# Patient Record
Sex: FEMALE | Race: WHITE | NOT HISPANIC OR LATINO | Employment: OTHER | URBAN - METROPOLITAN AREA
[De-identification: names, ages, dates, MRNs, and addresses within clinical notes are randomized per-mention and may not be internally consistent; named-entity substitution may affect disease eponyms.]

---

## 2017-10-26 ENCOUNTER — APPOINTMENT (OUTPATIENT)
Dept: WOMENS IMAGING | Facility: HOSPITAL | Age: 82
End: 2017-10-26

## 2017-10-26 PROCEDURE — G0202 SCR MAMMO BI INCL CAD: HCPCS | Performed by: RADIOLOGY

## 2017-11-02 ENCOUNTER — APPOINTMENT (OUTPATIENT)
Dept: WOMENS IMAGING | Facility: HOSPITAL | Age: 82
End: 2017-11-02

## 2017-11-02 PROCEDURE — G0206 DX MAMMO INCL CAD UNI: HCPCS | Performed by: RADIOLOGY

## 2017-11-14 ENCOUNTER — APPOINTMENT (OUTPATIENT)
Dept: WOMENS IMAGING | Facility: HOSPITAL | Age: 82
End: 2017-11-14

## 2017-11-14 PROCEDURE — 77080 DXA BONE DENSITY AXIAL: CPT | Performed by: RADIOLOGY

## 2017-11-14 PROCEDURE — 19081 BX BREAST 1ST LESION STRTCTC: CPT | Performed by: RADIOLOGY

## 2018-08-20 ENCOUNTER — OFFICE VISIT CONVERTED (OUTPATIENT)
Dept: CARDIOLOGY | Facility: CLINIC | Age: 83
End: 2018-08-20
Attending: INTERNAL MEDICINE

## 2018-08-20 ENCOUNTER — CONVERSION ENCOUNTER (OUTPATIENT)
Dept: CARDIOLOGY | Facility: CLINIC | Age: 83
End: 2018-08-20

## 2018-10-29 ENCOUNTER — APPOINTMENT (OUTPATIENT)
Dept: WOMENS IMAGING | Facility: HOSPITAL | Age: 83
End: 2018-10-29

## 2018-10-29 PROCEDURE — 77067 SCR MAMMO BI INCL CAD: CPT | Performed by: RADIOLOGY

## 2018-12-26 ENCOUNTER — CONVERSION ENCOUNTER (OUTPATIENT)
Dept: ORTHOPEDIC SURGERY | Facility: CLINIC | Age: 83
End: 2018-12-26

## 2018-12-26 ENCOUNTER — OFFICE VISIT CONVERTED (OUTPATIENT)
Dept: ORTHOPEDIC SURGERY | Facility: CLINIC | Age: 83
End: 2018-12-26
Attending: ORTHOPAEDIC SURGERY

## 2019-02-05 ENCOUNTER — HOSPITAL ENCOUNTER (OUTPATIENT)
Dept: LAB | Facility: HOSPITAL | Age: 84
Discharge: HOME | End: 2019-02-05
Attending: INTERNAL MEDICINE

## 2019-02-05 LAB
BASOPHILS # BLD AUTO: 0.05 10*3/UL (ref 0–0.2)
BASOPHILS NFR BLD AUTO: 0.42 % (ref 0–3)
EOSINOPHIL # BLD AUTO: 0.46 10*3/UL (ref 0–0.7)
EOSINOPHIL # BLD AUTO: 3.79 % (ref 0–7)
ERYTHROCYTE [DISTWIDTH] IN BLOOD BY AUTOMATED COUNT: 14.6 % (ref 11.5–14.5)
HBA1C MFR BLD: 12.2 G/DL (ref 12–16)
HCT VFR BLD AUTO: 37.2 % (ref 37–47)
LYMPHOCYTES # BLD AUTO: 2.26 10*3/UL (ref 1–5)
MCH RBC QN AUTO: 29.6 PG (ref 27–31)
MCHC RBC AUTO-ENTMCNC: 33 G/DL (ref 33–37)
MCV RBC AUTO: 89.9 FL (ref 81–99)
MONOCYTES # BLD AUTO: 0.75 10*3/UL (ref 0.2–1.2)
MONOCYTES NFR BLD AUTO: 6.27 % (ref 3–10)
NEUTROPHILS # BLD AUTO: 8.52 10*3/UL (ref 2–8)
NEUTROPHILS NFR BLD AUTO: 70.8 % (ref 30–85)
NRBC BLD AUTO-RTO: 0 % (ref 0–0.01)
PLATELET # BLD AUTO: 378 10*3/UL (ref 130–400)
PMV BLD AUTO: 6.3 FL (ref 7.4–10.4)
RBC # BLD AUTO: 4.13 10*6/UL (ref 4.2–5.4)
VARIANT LYMPHS NFR BLD MANUAL: 18.7 % (ref 20–45)
WBC # BLD AUTO: 12 10*3/UL (ref 4.8–10.8)

## 2019-02-19 ENCOUNTER — HOSPITAL ENCOUNTER (OUTPATIENT)
Dept: LAB | Facility: HOSPITAL | Age: 84
Discharge: HOME | End: 2019-02-19
Attending: INTERNAL MEDICINE

## 2019-02-19 LAB
ALBUMIN SERPL-MCNC: 3.9 G/DL (ref 3.5–5)
ALBUMIN/GLOB SERPL: 1.3 {RATIO} (ref 1.4–2.6)
ALP SERPL-CCNC: 72 U/L (ref 43–160)
ALT SERPL-CCNC: 19 U/L (ref 10–40)
ANION GAP SERPL CALC-SCNC: 16 MMOL/L (ref 8–19)
AST SERPL-CCNC: 15 U/L (ref 15–50)
BILIRUB SERPL-MCNC: 0.31 MG/DL (ref 0.2–1.3)
BUN SERPL-MCNC: 23 MG/DL (ref 5–25)
BUN/CREAT SERPL: 25 {RATIO} (ref 6–20)
CALCIUM SERPL-MCNC: 9.7 MG/DL (ref 8.7–10.4)
CHLORIDE SERPL-SCNC: 97 MMOL/L (ref 99–111)
CHOLEST SERPL-MCNC: 142 MG/DL (ref 107–200)
CHOLEST/HDLC SERPL: 2.7 {RATIO} (ref 3–6)
CONV CO2: 28 MMOL/L (ref 22–32)
CONV TOTAL PROTEIN: 7 G/DL (ref 6.3–8.2)
CREAT UR-MCNC: 0.91 MG/DL (ref 0.5–0.9)
GFR SERPLBLD BASED ON 1.73 SQ M-ARVRAT: 59 ML/MIN/{1.73_M2}
GLOBULIN UR ELPH-MCNC: 3.1 G/DL (ref 2–3.5)
GLUCOSE SERPL-MCNC: 112 MG/DL (ref 65–99)
HDLC SERPL-MCNC: 53 MG/DL (ref 40–60)
LDLC SERPL CALC-MCNC: 58 MG/DL (ref 70–100)
OSMOLALITY SERPL CALC.SUM OF ELEC: 286 MOSM/KG (ref 273–304)
POTASSIUM SERPL-SCNC: 4.6 MMOL/L (ref 3.5–5.3)
SODIUM SERPL-SCNC: 136 MMOL/L (ref 135–147)
TRIGL SERPL-MCNC: 156 MG/DL (ref 40–150)
VLDLC SERPL-MCNC: 31 MG/DL (ref 5–37)

## 2019-02-20 ENCOUNTER — OFFICE VISIT CONVERTED (OUTPATIENT)
Dept: CARDIOLOGY | Facility: CLINIC | Age: 84
End: 2019-02-20
Attending: INTERNAL MEDICINE

## 2019-04-16 ENCOUNTER — HOSPITAL ENCOUNTER (OUTPATIENT)
Dept: LAB | Facility: HOSPITAL | Age: 84
Discharge: HOME | End: 2019-04-16
Attending: INTERNAL MEDICINE

## 2019-04-16 ENCOUNTER — HOSPITAL ENCOUNTER (OUTPATIENT)
Dept: GENERAL RADIOLOGY | Facility: HOSPITAL | Age: 84
Discharge: HOME | End: 2019-04-16
Attending: INTERNAL MEDICINE

## 2019-04-16 LAB
BASOPHILS # BLD AUTO: 0.05 10*3/UL (ref 0–0.2)
BASOPHILS NFR BLD AUTO: 0.3 % (ref 0–3)
BNP SERPL-MCNC: 52 PG/ML (ref 0–1800)
CONV ABS IMM GRAN: 0.04 10*3/UL (ref 0–0.2)
CONV IMMATURE GRAN: 0.3 % (ref 0–1.8)
DEPRECATED RDW RBC AUTO: 43.8 FL (ref 36.4–46.3)
EOSINOPHIL # BLD AUTO: 0.76 10*3/UL (ref 0–0.7)
EOSINOPHIL # BLD AUTO: 5.3 % (ref 0–7)
ERYTHROCYTE [DISTWIDTH] IN BLOOD BY AUTOMATED COUNT: 13.3 % (ref 11.7–14.4)
HBA1C MFR BLD: 11.5 G/DL (ref 12–16)
HCT VFR BLD AUTO: 36 % (ref 37–47)
LYMPHOCYTES # BLD AUTO: 1.68 10*3/UL (ref 1–5)
MCH RBC QN AUTO: 29.1 PG (ref 27–31)
MCHC RBC AUTO-ENTMCNC: 31.9 G/DL (ref 33–37)
MCV RBC AUTO: 91.1 FL (ref 81–99)
MONOCYTES # BLD AUTO: 0.85 10*3/UL (ref 0.2–1.2)
MONOCYTES NFR BLD AUTO: 5.9 % (ref 3–10)
NEUTROPHILS # BLD AUTO: 11.04 10*3/UL (ref 2–8)
NEUTROPHILS NFR BLD AUTO: 76.5 % (ref 30–85)
NRBC CBCN: 0 % (ref 0–0.7)
PLATELET # BLD AUTO: 343 10*3/UL (ref 130–400)
PMV BLD AUTO: 9.2 FL (ref 9.4–12.3)
RBC # BLD AUTO: 3.95 10*6/UL (ref 4.2–5.4)
VARIANT LYMPHS NFR BLD MANUAL: 11.7 % (ref 20–45)
WBC # BLD AUTO: 14.42 10*3/UL (ref 4.8–10.8)

## 2019-04-17 ENCOUNTER — HOSPITAL ENCOUNTER (OUTPATIENT)
Dept: GENERAL RADIOLOGY | Facility: HOSPITAL | Age: 84
Discharge: HOME | End: 2019-04-17
Attending: INTERNAL MEDICINE

## 2019-04-18 ENCOUNTER — HOSPITAL ENCOUNTER (OUTPATIENT)
Dept: LAB | Facility: HOSPITAL | Age: 84
Discharge: HOME | End: 2019-04-18
Attending: INTERNAL MEDICINE

## 2019-04-18 LAB — IMMUNOCAP RESULT: NORMAL (ref 0–0)

## 2019-04-21 LAB
A FLAVUS IGE QN: NEGATIVE
A NIGER AB SER QL: NEGATIVE
CONV ASPERGILLUS FUMIGATUS AB.: NEGATIVE

## 2019-04-22 LAB
A FUMIGATUS AB SER QL ID: <0.1 K[IU]/ML
IGE SERPL-ACNC: 29 K[IU]/ML (ref 0–24)

## 2019-04-29 ENCOUNTER — HOSPITAL ENCOUNTER (OUTPATIENT)
Dept: LAB | Facility: HOSPITAL | Age: 84
Discharge: HOME | End: 2019-04-29
Attending: INTERNAL MEDICINE

## 2019-04-29 LAB
ALBUMIN SERPL-MCNC: 4.3 G/DL (ref 3.5–5)
ALBUMIN/GLOB SERPL: 1.3 {RATIO} (ref 1.4–2.6)
ALP SERPL-CCNC: 72 U/L (ref 43–160)
ALT SERPL-CCNC: 18 U/L (ref 10–40)
ANION GAP SERPL CALC-SCNC: 20 MMOL/L (ref 8–19)
AST SERPL-CCNC: 16 U/L (ref 15–50)
BASOPHILS # BLD AUTO: 0.07 10*3/UL (ref 0–0.2)
BASOPHILS NFR BLD AUTO: 0.6 % (ref 0–3)
BILIRUB SERPL-MCNC: 0.36 MG/DL (ref 0.2–1.3)
BUN SERPL-MCNC: 16 MG/DL (ref 5–25)
BUN/CREAT SERPL: 19 {RATIO} (ref 6–20)
CALCIUM SERPL-MCNC: 10.1 MG/DL (ref 8.7–10.4)
CHLORIDE SERPL-SCNC: 96 MMOL/L (ref 99–111)
CONV ABS IMM GRAN: 0.04 10*3/UL (ref 0–0.2)
CONV CO2: 27 MMOL/L (ref 22–32)
CONV IMMATURE GRAN: 0.3 % (ref 0–1.8)
CONV TOTAL PROTEIN: 7.5 G/DL (ref 6.3–8.2)
CREAT UR-MCNC: 0.84 MG/DL (ref 0.5–0.9)
DEPRECATED RDW RBC AUTO: 45.2 FL (ref 36.4–46.3)
EOSINOPHIL # BLD AUTO: 0.39 10*3/UL (ref 0–0.7)
EOSINOPHIL # BLD AUTO: 3.2 % (ref 0–7)
ERYTHROCYTE [DISTWIDTH] IN BLOOD BY AUTOMATED COUNT: 13.3 % (ref 11.7–14.4)
EST. AVERAGE GLUCOSE BLD GHB EST-MCNC: 146 MG/DL
GFR SERPLBLD BASED ON 1.73 SQ M-ARVRAT: >60 ML/MIN/{1.73_M2}
GLOBULIN UR ELPH-MCNC: 3.2 G/DL (ref 2–3.5)
GLUCOSE SERPL-MCNC: 89 MG/DL (ref 65–99)
HBA1C MFR BLD: 12.2 G/DL (ref 12–16)
HBA1C MFR BLD: 6.7 % (ref 3.5–5.7)
HCT VFR BLD AUTO: 39.8 % (ref 37–47)
LYMPHOCYTES # BLD AUTO: 1.75 10*3/UL (ref 1–5)
MCH RBC QN AUTO: 28.4 PG (ref 27–31)
MCHC RBC AUTO-ENTMCNC: 30.7 G/DL (ref 33–37)
MCV RBC AUTO: 92.8 FL (ref 81–99)
MONOCYTES # BLD AUTO: 0.9 10*3/UL (ref 0.2–1.2)
MONOCYTES NFR BLD AUTO: 7.3 % (ref 3–10)
NEUTROPHILS # BLD AUTO: 9.1 10*3/UL (ref 2–8)
NEUTROPHILS NFR BLD AUTO: 74.3 % (ref 30–85)
NRBC CBCN: 0 % (ref 0–0.7)
OSMOLALITY SERPL CALC.SUM OF ELEC: 287 MOSM/KG (ref 273–304)
PLATELET # BLD AUTO: 396 10*3/UL (ref 130–400)
PMV BLD AUTO: 9 FL (ref 9.4–12.3)
POTASSIUM SERPL-SCNC: 4.5 MMOL/L (ref 3.5–5.3)
RBC # BLD AUTO: 4.29 10*6/UL (ref 4.2–5.4)
SODIUM SERPL-SCNC: 138 MMOL/L (ref 135–147)
VARIANT LYMPHS NFR BLD MANUAL: 14.3 % (ref 20–45)
WBC # BLD AUTO: 12.25 10*3/UL (ref 4.8–10.8)

## 2019-05-16 ENCOUNTER — HOSPITAL ENCOUNTER (OUTPATIENT)
Dept: CARDIOLOGY | Facility: HOSPITAL | Age: 84
Discharge: HOME | End: 2019-05-16
Attending: INTERNAL MEDICINE

## 2019-06-13 ENCOUNTER — OFFICE VISIT CONVERTED (OUTPATIENT)
Dept: ONCOLOGY | Facility: HOSPITAL | Age: 84
End: 2019-06-13
Attending: INTERNAL MEDICINE

## 2019-06-26 ENCOUNTER — HOSPITAL ENCOUNTER (OUTPATIENT)
Dept: ONCOLOGY | Facility: HOSPITAL | Age: 84
Discharge: HOME | End: 2019-06-26
Attending: INTERNAL MEDICINE

## 2019-06-26 ENCOUNTER — OFFICE VISIT CONVERTED (OUTPATIENT)
Dept: ONCOLOGY | Facility: HOSPITAL | Age: 84
End: 2019-06-26
Attending: INTERNAL MEDICINE

## 2019-06-26 LAB
ALBUMIN SERPL-MCNC: 4 G/DL (ref 3.5–5)
ALBUMIN/GLOB SERPL: 1.3 {RATIO} (ref 1.4–2.6)
ALP SERPL-CCNC: 76 U/L (ref 43–160)
ALT SERPL-CCNC: 20 U/L (ref 10–40)
ANION GAP SERPL CALC-SCNC: 17 MMOL/L (ref 8–19)
AST SERPL-CCNC: 13 U/L (ref 15–50)
BASOPHILS # BLD AUTO: 0.03 10*3/UL (ref 0–0.2)
BASOPHILS NFR BLD AUTO: 0.3 % (ref 0–3)
BILIRUB SERPL-MCNC: 0.4 MG/DL (ref 0.2–1.3)
BUN SERPL-MCNC: 13 MG/DL (ref 5–25)
BUN/CREAT SERPL: 13 {RATIO} (ref 6–20)
CALCIUM SERPL-MCNC: 9.2 MG/DL (ref 8.7–10.4)
CHLORIDE SERPL-SCNC: 95 MMOL/L (ref 99–111)
CONV ABS IMM GRAN: 0.03 10*3/UL (ref 0–0.2)
CONV CO2: 27 MMOL/L (ref 22–32)
CONV IMMATURE GRAN: 0.3 % (ref 0–1.8)
CONV TOTAL PROTEIN: 7.1 G/DL (ref 6.3–8.2)
CREAT UR-MCNC: 1 MG/DL (ref 0.5–0.9)
CRP SERPL HS-MCNC: 0.76 MG/DL (ref 0–0.5)
DEPRECATED RDW RBC AUTO: 47.6 FL (ref 36.4–46.3)
EOSINOPHIL # BLD AUTO: 0.21 10*3/UL (ref 0–0.7)
EOSINOPHIL # BLD AUTO: 1.8 % (ref 0–7)
ERYTHROCYTE [DISTWIDTH] IN BLOOD BY AUTOMATED COUNT: 14.4 % (ref 11.7–14.4)
ERYTHROCYTE [SEDIMENTATION RATE] IN BLOOD: 33 MM/H (ref 0–30)
GFR SERPLBLD BASED ON 1.73 SQ M-ARVRAT: 53 ML/MIN/{1.73_M2}
GLOBULIN UR ELPH-MCNC: 3.1 G/DL (ref 2–3.5)
GLUCOSE SERPL-MCNC: 138 MG/DL (ref 65–99)
HBA1C MFR BLD: 11.5 G/DL (ref 12–16)
HCT VFR BLD AUTO: 38.6 % (ref 37–47)
LDH SERPL-CCNC: 142 U/L (ref 120–240)
LYMPHOCYTES # BLD AUTO: 1.91 10*3/UL (ref 1–5)
MCH RBC QN AUTO: 27.1 PG (ref 27–31)
MCHC RBC AUTO-ENTMCNC: 29.8 G/DL (ref 33–37)
MCV RBC AUTO: 91 FL (ref 81–99)
MONOCYTES # BLD AUTO: 0.82 10*3/UL (ref 0.2–1.2)
MONOCYTES NFR BLD AUTO: 7.1 % (ref 3–10)
NEUTROPHILS # BLD AUTO: 8.63 10*3/UL (ref 2–8)
NEUTROPHILS NFR BLD AUTO: 74.1 % (ref 30–85)
NRBC CBCN: 0 % (ref 0–0.7)
OSMOLALITY SERPL CALC.SUM OF ELEC: 280 MOSM/KG (ref 273–304)
PLATELET # BLD AUTO: 347 10*3/UL (ref 130–400)
PMV BLD AUTO: 9.2 FL (ref 9.4–12.3)
POTASSIUM SERPL-SCNC: 5 MMOL/L (ref 3.5–5.3)
RBC # BLD AUTO: 4.24 10*6/UL (ref 4.2–5.4)
SODIUM SERPL-SCNC: 134 MMOL/L (ref 135–147)
VARIANT LYMPHS NFR BLD MANUAL: 16.4 % (ref 20–45)
WBC # BLD AUTO: 11.63 10*3/UL (ref 4.8–10.8)

## 2019-06-27 LAB
CONV IMMUNOGLOBULIN G (IGG): 881 MG/DL (ref 700–1600)
CONV IMMUNOGLOBULIN M (IGM): 90 MG/DL (ref 26–217)
IGA SERPL-MCNC: 181 MG/DL (ref 64–422)

## 2019-07-07 LAB
Lab: NORMAL
Lab: NORMAL

## 2019-07-15 ENCOUNTER — HOSPITAL ENCOUNTER (OUTPATIENT)
Dept: GENERAL RADIOLOGY | Facility: HOSPITAL | Age: 84
Discharge: HOME | End: 2019-07-15
Attending: INTERNAL MEDICINE

## 2019-07-16 ENCOUNTER — TELEPHONE CONVERTED (OUTPATIENT)
Dept: ONCOLOGY | Facility: HOSPITAL | Age: 84
End: 2019-07-16

## 2019-07-17 ENCOUNTER — OFFICE VISIT CONVERTED (OUTPATIENT)
Dept: ORTHOPEDIC SURGERY | Facility: CLINIC | Age: 84
End: 2019-07-17
Attending: PHYSICIAN ASSISTANT

## 2019-07-18 ENCOUNTER — HOSPITAL ENCOUNTER (OUTPATIENT)
Dept: MRI IMAGING | Facility: HOSPITAL | Age: 84
Discharge: HOME | End: 2019-07-18
Attending: INTERNAL MEDICINE

## 2019-08-15 ENCOUNTER — HOSPITAL ENCOUNTER (OUTPATIENT)
Dept: ONCOLOGY | Facility: HOSPITAL | Age: 84
Discharge: HOME | End: 2019-08-15
Attending: INTERNAL MEDICINE

## 2019-08-15 LAB
ALBUMIN SERPL-MCNC: 4.2 G/DL (ref 3.5–5)
ALBUMIN/GLOB SERPL: 1.3 {RATIO} (ref 1.4–2.6)
ALP SERPL-CCNC: 83 U/L (ref 43–160)
ALT SERPL-CCNC: 20 U/L (ref 10–40)
ANION GAP SERPL CALC-SCNC: 18 MMOL/L (ref 8–19)
AST SERPL-CCNC: 13 U/L (ref 15–50)
BASOPHILS # BLD AUTO: 0.04 10*3/UL (ref 0–0.2)
BASOPHILS NFR BLD AUTO: 0.3 % (ref 0–3)
BILIRUB SERPL-MCNC: 0.33 MG/DL (ref 0.2–1.3)
BUN SERPL-MCNC: 16 MG/DL (ref 5–25)
BUN/CREAT SERPL: 19 {RATIO} (ref 6–20)
CALCIUM SERPL-MCNC: 9.4 MG/DL (ref 8.7–10.4)
CHLORIDE SERPL-SCNC: 94 MMOL/L (ref 99–111)
CONV ABS IMM GRAN: 0.06 10*3/UL (ref 0–0.2)
CONV CO2: 28 MMOL/L (ref 22–32)
CONV IMMATURE GRAN: 0.5 % (ref 0–1.8)
CONV TOTAL PROTEIN: 7.5 G/DL (ref 6.3–8.2)
CREAT UR-MCNC: 0.85 MG/DL (ref 0.5–0.9)
DEPRECATED RDW RBC AUTO: 51.5 FL (ref 36.4–46.3)
EOSINOPHIL # BLD AUTO: 0.26 10*3/UL (ref 0–0.7)
EOSINOPHIL # BLD AUTO: 2 % (ref 0–7)
ERYTHROCYTE [DISTWIDTH] IN BLOOD BY AUTOMATED COUNT: 15.7 % (ref 11.7–14.4)
ERYTHROCYTE [SEDIMENTATION RATE] IN BLOOD: 27 MM/H (ref 0–30)
FERRITIN SERPL-MCNC: 22 NG/ML (ref 10–200)
FOLATE SERPL-MCNC: >20 NG/ML (ref 4.8–20)
GFR SERPLBLD BASED ON 1.73 SQ M-ARVRAT: >60 ML/MIN/{1.73_M2}
GLOBULIN UR ELPH-MCNC: 3.3 G/DL (ref 2–3.5)
GLUCOSE SERPL-MCNC: 203 MG/DL (ref 65–99)
HCT VFR BLD AUTO: 39.7 % (ref 37–47)
HGB BLD-MCNC: 12.2 G/DL (ref 12–16)
IRON SERPL-MCNC: 45 UG/DL (ref 60–170)
LDH SERPL-CCNC: 166 U/L (ref 120–240)
LYMPHOCYTES # BLD AUTO: 1.55 10*3/UL (ref 1–5)
LYMPHOCYTES NFR BLD AUTO: 12 % (ref 20–45)
MCH RBC QN AUTO: 27.6 PG (ref 27–31)
MCHC RBC AUTO-ENTMCNC: 30.7 G/DL (ref 33–37)
MCV RBC AUTO: 89.8 FL (ref 81–99)
MONOCYTES # BLD AUTO: 0.82 10*3/UL (ref 0.2–1.2)
MONOCYTES NFR BLD AUTO: 6.4 % (ref 3–10)
NEUTROPHILS # BLD AUTO: 10.15 10*3/UL (ref 2–8)
NEUTROPHILS NFR BLD AUTO: 78.8 % (ref 30–85)
NRBC CBCN: 0 % (ref 0–0.7)
OSMOLALITY SERPL CALC.SUM OF ELEC: 287 MOSM/KG (ref 273–304)
PLATELET # BLD AUTO: 331 10*3/UL (ref 130–400)
PMV BLD AUTO: 8.9 FL (ref 9.4–12.3)
POTASSIUM SERPL-SCNC: 4.6 MMOL/L (ref 3.5–5.3)
RBC # BLD AUTO: 4.42 10*6/UL (ref 4.2–5.4)
RETICS # AUTO: 0.08 10*6/UL (ref 0.02–0.08)
RETICS/RBC NFR AUTO: 1.75 % (ref 0.5–1.7)
SODIUM SERPL-SCNC: 135 MMOL/L (ref 135–147)
T4 FREE SERPL-MCNC: 1.2 NG/DL (ref 0.9–1.8)
TSH SERPL-ACNC: 3.57 M[IU]/L (ref 0.27–4.2)
VIT B12 SERPL-MCNC: 437 PG/ML (ref 211–911)
WBC # BLD AUTO: 12.88 10*3/UL (ref 4.8–10.8)

## 2019-08-16 ENCOUNTER — OFFICE VISIT CONVERTED (OUTPATIENT)
Dept: ONCOLOGY | Facility: HOSPITAL | Age: 84
End: 2019-08-16
Attending: INTERNAL MEDICINE

## 2019-08-16 ENCOUNTER — HOSPITAL ENCOUNTER (OUTPATIENT)
Dept: ONCOLOGY | Facility: HOSPITAL | Age: 84
Discharge: HOME | End: 2019-08-16
Attending: INTERNAL MEDICINE

## 2019-08-16 LAB — HAPTOGLOB SERPL-MCNC: 289 MG/DL (ref 34–200)

## 2019-08-21 ENCOUNTER — OFFICE VISIT CONVERTED (OUTPATIENT)
Dept: CARDIOLOGY | Facility: CLINIC | Age: 84
End: 2019-08-21
Attending: INTERNAL MEDICINE

## 2019-10-02 ENCOUNTER — OFFICE VISIT CONVERTED (OUTPATIENT)
Dept: ORTHOPEDIC SURGERY | Facility: CLINIC | Age: 84
End: 2019-10-02
Attending: ORTHOPAEDIC SURGERY

## 2019-10-02 ENCOUNTER — CONVERSION ENCOUNTER (OUTPATIENT)
Dept: ORTHOPEDIC SURGERY | Facility: CLINIC | Age: 84
End: 2019-10-02

## 2019-10-31 ENCOUNTER — APPOINTMENT (OUTPATIENT)
Dept: WOMENS IMAGING | Facility: HOSPITAL | Age: 84
End: 2019-10-31

## 2019-11-04 PROCEDURE — 77067 SCR MAMMO BI INCL CAD: CPT | Performed by: RADIOLOGY

## 2019-11-04 PROCEDURE — 77063 BREAST TOMOSYNTHESIS BI: CPT | Performed by: RADIOLOGY

## 2019-11-05 ENCOUNTER — HOSPITAL ENCOUNTER (OUTPATIENT)
Dept: LAB | Facility: HOSPITAL | Age: 84
Discharge: HOME | End: 2019-11-05
Attending: INTERNAL MEDICINE

## 2019-11-05 LAB
ALBUMIN SERPL-MCNC: 4.2 G/DL (ref 3.5–5)
ALBUMIN/GLOB SERPL: 1.4 {RATIO} (ref 1.4–2.6)
ALP SERPL-CCNC: 76 U/L (ref 43–160)
ALT SERPL-CCNC: 17 U/L (ref 10–40)
ANION GAP SERPL CALC-SCNC: 21 MMOL/L (ref 8–19)
AST SERPL-CCNC: 14 U/L (ref 15–50)
BASOPHILS # BLD AUTO: 0.05 10*3/UL (ref 0–0.2)
BASOPHILS NFR BLD AUTO: 0.5 % (ref 0–3)
BILIRUB SERPL-MCNC: 0.34 MG/DL (ref 0.2–1.3)
BUN SERPL-MCNC: 11 MG/DL (ref 5–25)
BUN/CREAT SERPL: 12 {RATIO} (ref 6–20)
CALCIUM SERPL-MCNC: 9.4 MG/DL (ref 8.7–10.4)
CHLORIDE SERPL-SCNC: 95 MMOL/L (ref 99–111)
CHOLEST SERPL-MCNC: 134 MG/DL (ref 107–200)
CHOLEST/HDLC SERPL: 2.7 {RATIO} (ref 3–6)
CONV ABS IMM GRAN: 0.04 10*3/UL (ref 0–0.2)
CONV CO2: 26 MMOL/L (ref 22–32)
CONV IMMATURE GRAN: 0.4 % (ref 0–1.8)
CONV TOTAL PROTEIN: 7.2 G/DL (ref 6.3–8.2)
CREAT UR-MCNC: 0.91 MG/DL (ref 0.5–0.9)
DEPRECATED RDW RBC AUTO: 51.3 FL (ref 36.4–46.3)
EOSINOPHIL # BLD AUTO: 0.18 10*3/UL (ref 0–0.7)
EOSINOPHIL # BLD AUTO: 1.7 % (ref 0–7)
ERYTHROCYTE [DISTWIDTH] IN BLOOD BY AUTOMATED COUNT: 15.3 % (ref 11.7–14.4)
EST. AVERAGE GLUCOSE BLD GHB EST-MCNC: 134 MG/DL
GFR SERPLBLD BASED ON 1.73 SQ M-ARVRAT: 59 ML/MIN/{1.73_M2}
GLOBULIN UR ELPH-MCNC: 3 G/DL (ref 2–3.5)
GLUCOSE SERPL-MCNC: 112 MG/DL (ref 65–99)
HBA1C MFR BLD: 6.3 % (ref 3.5–5.7)
HCT VFR BLD AUTO: 38.2 % (ref 37–47)
HDLC SERPL-MCNC: 49 MG/DL (ref 40–60)
HGB BLD-MCNC: 11.5 G/DL (ref 12–16)
LDLC SERPL CALC-MCNC: 61 MG/DL (ref 70–100)
LYMPHOCYTES # BLD AUTO: 1.7 10*3/UL (ref 1–5)
LYMPHOCYTES NFR BLD AUTO: 16.3 % (ref 20–45)
MCH RBC QN AUTO: 27.6 PG (ref 27–31)
MCHC RBC AUTO-ENTMCNC: 30.1 G/DL (ref 33–37)
MCV RBC AUTO: 91.6 FL (ref 81–99)
MONOCYTES # BLD AUTO: 0.72 10*3/UL (ref 0.2–1.2)
MONOCYTES NFR BLD AUTO: 6.9 % (ref 3–10)
NEUTROPHILS # BLD AUTO: 7.77 10*3/UL (ref 2–8)
NEUTROPHILS NFR BLD AUTO: 74.2 % (ref 30–85)
NRBC CBCN: 0 % (ref 0–0.7)
OSMOLALITY SERPL CALC.SUM OF ELEC: 284 MOSM/KG (ref 273–304)
PLATELET # BLD AUTO: 372 10*3/UL (ref 130–400)
PMV BLD AUTO: 9.3 FL (ref 9.4–12.3)
POTASSIUM SERPL-SCNC: 4.6 MMOL/L (ref 3.5–5.3)
RBC # BLD AUTO: 4.17 10*6/UL (ref 4.2–5.4)
SODIUM SERPL-SCNC: 137 MMOL/L (ref 135–147)
TRIGL SERPL-MCNC: 122 MG/DL (ref 40–150)
TSH SERPL-ACNC: 2.86 M[IU]/L (ref 0.27–4.2)
VLDLC SERPL-MCNC: 24 MG/DL (ref 5–37)
WBC # BLD AUTO: 10.46 10*3/UL (ref 4.8–10.8)

## 2020-01-16 ENCOUNTER — HOSPITAL ENCOUNTER (OUTPATIENT)
Dept: URGENT CARE | Facility: CLINIC | Age: 85
Discharge: HOME | End: 2020-01-16
Attending: FAMILY MEDICINE

## 2020-01-18 LAB
AMOXICILLIN+CLAV SUSC ISLT: <=2
AMPICILLIN SUSC ISLT: <=2
AMPICILLIN+SULBAC SUSC ISLT: <=2
BACTERIA UR CULT: ABNORMAL
CEFAZOLIN SUSC ISLT: <=4
CEFEPIME SUSC ISLT: <=1
CEFTAZIDIME SUSC ISLT: <=1
CEFTRIAXONE SUSC ISLT: <=1
CEFUROXIME ORAL SUSC ISLT: <=1
CEFUROXIME PARENTER SUSC ISLT: <=1
CIPROFLOXACIN SUSC ISLT: <=0.25
ERTAPENEM SUSC ISLT: <=0.5
GENTAMICIN SUSC ISLT: <=1
LEVOFLOXACIN SUSC ISLT: <=0.12
NITROFURANTOIN SUSC ISLT: 128
TETRACYCLINE SUSC ISLT: >=16
TMP SMX SUSC ISLT: <=20
TOBRAMYCIN SUSC ISLT: <=1

## 2020-05-18 ENCOUNTER — HOSPITAL ENCOUNTER (OUTPATIENT)
Dept: LAB | Facility: HOSPITAL | Age: 85
Discharge: HOME | End: 2020-05-18
Attending: INTERNAL MEDICINE

## 2020-05-18 LAB
ALBUMIN SERPL-MCNC: 4.1 G/DL (ref 3.5–5)
ALBUMIN/GLOB SERPL: 1.3 {RATIO} (ref 1.4–2.6)
ALP SERPL-CCNC: 78 U/L (ref 43–160)
ALT SERPL-CCNC: 16 U/L (ref 10–40)
ANION GAP SERPL CALC-SCNC: 16 MMOL/L (ref 8–19)
AST SERPL-CCNC: 12 U/L (ref 15–50)
BASOPHILS # BLD AUTO: 0.04 10*3/UL (ref 0–0.2)
BASOPHILS NFR BLD AUTO: 0.3 % (ref 0–3)
BILIRUB SERPL-MCNC: 0.26 MG/DL (ref 0.2–1.3)
BUN SERPL-MCNC: 22 MG/DL (ref 5–25)
BUN/CREAT SERPL: 25 {RATIO} (ref 6–20)
CALCIUM SERPL-MCNC: 9.5 MG/DL (ref 8.7–10.4)
CHLORIDE SERPL-SCNC: 96 MMOL/L (ref 99–111)
CONV ABS IMM GRAN: 0.05 10*3/UL (ref 0–0.2)
CONV CO2: 26 MMOL/L (ref 22–32)
CONV CREATININE URINE, RANDOM: 172.5 MG/DL (ref 10–300)
CONV IMMATURE GRAN: 0.4 % (ref 0–1.8)
CONV MICROALBUM.,U,RANDOM: 51.3 MG/L (ref 0–20)
CONV TOTAL PROTEIN: 7.2 G/DL (ref 6.3–8.2)
CREAT UR-MCNC: 0.89 MG/DL (ref 0.5–0.9)
DEPRECATED RDW RBC AUTO: 48.1 FL (ref 36.4–46.3)
EOSINOPHIL # BLD AUTO: 0.19 10*3/UL (ref 0–0.7)
EOSINOPHIL # BLD AUTO: 1.6 % (ref 0–7)
ERYTHROCYTE [DISTWIDTH] IN BLOOD BY AUTOMATED COUNT: 15.5 % (ref 11.7–14.4)
EST. AVERAGE GLUCOSE BLD GHB EST-MCNC: 137 MG/DL
GFR SERPLBLD BASED ON 1.73 SQ M-ARVRAT: >60 ML/MIN/{1.73_M2}
GLOBULIN UR ELPH-MCNC: 3.1 G/DL (ref 2–3.5)
GLUCOSE SERPL-MCNC: 159 MG/DL (ref 65–99)
HBA1C MFR BLD: 6.4 % (ref 3.5–5.7)
HCT VFR BLD AUTO: 38.3 % (ref 37–47)
HGB BLD-MCNC: 11.8 G/DL (ref 12–16)
LYMPHOCYTES # BLD AUTO: 1.56 10*3/UL (ref 1–5)
LYMPHOCYTES NFR BLD AUTO: 13.1 % (ref 20–45)
MCH RBC QN AUTO: 26.3 PG (ref 27–31)
MCHC RBC AUTO-ENTMCNC: 30.8 G/DL (ref 33–37)
MCV RBC AUTO: 85.3 FL (ref 81–99)
MICROALBUMIN/CREAT UR: 29.7 MG/G{CRE} (ref 0–35)
MONOCYTES # BLD AUTO: 0.85 10*3/UL (ref 0.2–1.2)
MONOCYTES NFR BLD AUTO: 7.1 % (ref 3–10)
NEUTROPHILS # BLD AUTO: 9.22 10*3/UL (ref 2–8)
NEUTROPHILS NFR BLD AUTO: 77.5 % (ref 30–85)
NRBC CBCN: 0 % (ref 0–0.7)
OSMOLALITY SERPL CALC.SUM OF ELEC: 283 MOSM/KG (ref 273–304)
PLATELET # BLD AUTO: 348 10*3/UL (ref 130–400)
PMV BLD AUTO: 9.3 FL (ref 9.4–12.3)
POTASSIUM SERPL-SCNC: 5 MMOL/L (ref 3.5–5.3)
RBC # BLD AUTO: 4.49 10*6/UL (ref 4.2–5.4)
SODIUM SERPL-SCNC: 133 MMOL/L (ref 135–147)
WBC # BLD AUTO: 11.91 10*3/UL (ref 4.8–10.8)

## 2020-07-15 ENCOUNTER — OFFICE VISIT CONVERTED (OUTPATIENT)
Dept: CARDIOLOGY | Facility: CLINIC | Age: 85
End: 2020-07-15
Attending: INTERNAL MEDICINE

## 2020-07-15 ENCOUNTER — CONVERSION ENCOUNTER (OUTPATIENT)
Dept: CARDIOLOGY | Facility: CLINIC | Age: 85
End: 2020-07-15

## 2020-11-16 ENCOUNTER — HOSPITAL ENCOUNTER (OUTPATIENT)
Dept: LAB | Facility: HOSPITAL | Age: 85
Discharge: HOME | End: 2020-11-16
Attending: INTERNAL MEDICINE

## 2020-11-16 LAB
ALBUMIN SERPL-MCNC: 3.8 G/DL (ref 3.5–5)
ALBUMIN/GLOB SERPL: 1.2 {RATIO} (ref 1.4–2.6)
ALP SERPL-CCNC: 84 U/L (ref 43–160)
ALT SERPL-CCNC: 14 U/L (ref 10–40)
ANION GAP SERPL CALC-SCNC: 20 MMOL/L (ref 8–19)
AST SERPL-CCNC: 13 U/L (ref 15–50)
BASOPHILS # BLD AUTO: 0.04 10*3/UL (ref 0–0.2)
BASOPHILS NFR BLD AUTO: 0.3 % (ref 0–3)
BILIRUB SERPL-MCNC: 0.34 MG/DL (ref 0.2–1.3)
BUN SERPL-MCNC: 13 MG/DL (ref 5–25)
BUN/CREAT SERPL: 18 {RATIO} (ref 6–20)
CALCIUM SERPL-MCNC: 9.9 MG/DL (ref 8.7–10.4)
CHLORIDE SERPL-SCNC: 94 MMOL/L (ref 99–111)
CONV ABS IMM GRAN: 0.05 10*3/UL (ref 0–0.2)
CONV CO2: 25 MMOL/L (ref 22–32)
CONV IMMATURE GRAN: 0.4 % (ref 0–1.8)
CONV TOTAL PROTEIN: 7.1 G/DL (ref 6.3–8.2)
CREAT UR-MCNC: 0.74 MG/DL (ref 0.5–0.9)
DEPRECATED RDW RBC AUTO: 47.8 FL (ref 36.4–46.3)
EOSINOPHIL # BLD AUTO: 0.21 10*3/UL (ref 0–0.7)
EOSINOPHIL # BLD AUTO: 1.8 % (ref 0–7)
ERYTHROCYTE [DISTWIDTH] IN BLOOD BY AUTOMATED COUNT: 15.3 % (ref 11.7–14.4)
EST. AVERAGE GLUCOSE BLD GHB EST-MCNC: 143 MG/DL
GFR SERPLBLD BASED ON 1.73 SQ M-ARVRAT: >60 ML/MIN/{1.73_M2}
GLOBULIN UR ELPH-MCNC: 3.3 G/DL (ref 2–3.5)
GLUCOSE SERPL-MCNC: 149 MG/DL (ref 65–99)
HBA1C MFR BLD: 6.6 % (ref 3.5–5.7)
HCT VFR BLD AUTO: 37 % (ref 37–47)
HGB BLD-MCNC: 11.4 G/DL (ref 12–16)
LYMPHOCYTES # BLD AUTO: 1.56 10*3/UL (ref 1–5)
LYMPHOCYTES NFR BLD AUTO: 13.1 % (ref 20–45)
MCH RBC QN AUTO: 26.4 PG (ref 27–31)
MCHC RBC AUTO-ENTMCNC: 30.8 G/DL (ref 33–37)
MCV RBC AUTO: 85.6 FL (ref 81–99)
MONOCYTES # BLD AUTO: 0.9 10*3/UL (ref 0.2–1.2)
MONOCYTES NFR BLD AUTO: 7.5 % (ref 3–10)
NEUTROPHILS # BLD AUTO: 9.18 10*3/UL (ref 2–8)
NEUTROPHILS NFR BLD AUTO: 76.9 % (ref 30–85)
NRBC CBCN: 0 % (ref 0–0.7)
OSMOLALITY SERPL CALC.SUM OF ELEC: 281 MOSM/KG (ref 273–304)
PLATELET # BLD AUTO: 339 10*3/UL (ref 130–400)
PMV BLD AUTO: 9.2 FL (ref 9.4–12.3)
POTASSIUM SERPL-SCNC: 4.9 MMOL/L (ref 3.5–5.3)
RBC # BLD AUTO: 4.32 10*6/UL (ref 4.2–5.4)
SODIUM SERPL-SCNC: 134 MMOL/L (ref 135–147)
TSH SERPL-ACNC: 2.59 M[IU]/L (ref 0.27–4.2)
WBC # BLD AUTO: 11.94 10*3/UL (ref 4.8–10.8)

## 2021-02-18 ENCOUNTER — HOSPITAL ENCOUNTER (OUTPATIENT)
Dept: VACCINE CLINIC | Facility: HOSPITAL | Age: 86
Discharge: HOME | End: 2021-02-18
Attending: INTERNAL MEDICINE

## 2021-03-19 ENCOUNTER — HOSPITAL ENCOUNTER (OUTPATIENT)
Dept: VACCINE CLINIC | Facility: HOSPITAL | Age: 86
Discharge: HOME | End: 2021-03-19
Attending: INTERNAL MEDICINE

## 2021-05-13 NOTE — PROGRESS NOTES
Progress Note      Patient Name: Devi Diallo   Patient ID: [id number]   Sex: Female   YOB: 1935    Primary Care Provider: Jacobo Redmond MD   Referring Provider: Mario Eaton MD    Visit Date: July 15, 2020    Provider: Mario Eaton MD   Location: [facility]   Location Address: [address] [place] [address], [place], [place]  [id number]   Location Phone: [phone]          Chief Complaint     Follow up of coronary artery disease, hypertension, and hyperlipidemia.       History Of Present Illness  REFERRING PROVIDER: Mario Eaton MD   Devi Diallo is an 84 year old /White female with coronary artery disease, hypertension, and hyperlipidemia who is doing reasonably well. She has occasional episodes of chest pain, which are self-limiting. She does not have to take a nitroglycerin. She just stops doing what she is doing and it goes away within a few minutes. She has had 1 or 2 episodes in the last 6 months. Her blood pressures at home are running between 130 and 150 systolic.   PAST MEDICAL HISTORY: Coronary artery disease (cardiac catheterization 2002); Diabetes mellitus; Hyperlipidemia; Hypertension.   FAMILY HISTORY: Negative for diabetes mellitus, hypertension, or heart disease.   PSYCHOSOCIAL HISTORY: Denies alcohol or tobacco use.   CURRENT MEDICATIONS: Glimepiride 2 mg half in the morning and whole tablet in the evening; Januvia 50 mg daily; pantoprazole 40 mg daily; paroxetine 40 mg b.i.d.; pravastatin 80 mg daily; oxybutynin 5 mg daily; metformin 850 mg b.i.d.; carvedilol 12.5 mg b.i.d.; aspirin 81 mg daily; Centrum Silver daily; vitamin D 1000 units daily; amlodipine 5 mg daily; losartan 50 mg b.i.d.       Review of Systems  · Cardiovascular  o Admits  o : swelling (feet, ankles, hands), shortness of breath while walking or lying flat, chest pain or angina pectoris   o Denies  o : palpitations (fast, fluttering, or skipping  "beats)  · Respiratory  o Denies  o : chronic or frequent cough, asthma or wheezing      Vitals  Date Time BP Position Site L\R Cuff Size HR RR TEMP (F) WT  HT  BMI kg/m2 BSA m2 O2 Sat HC       07/15/2020 03:30 /54 Sitting    76 - R   220lbs 0oz 5'  3\" 38.97 2.11     07/15/2020 03:30 /52 Standing                     Physical Examination  · Constitutional  o Appearance  o : Awake, alert, in no acute distress, obese female.  · Eyes  o Conjunctivae  o : Conjunctivae normal.  · Ears, Nose, Mouth and Throat  o Oral Cavity  o :   § Oral Mucosa  § : Normal.  · Neck  o Inspection/Palpation/Auscultation  o : No lymphadenopathy. No JVD. No bruit. Good carotid upstroke.  · Respiratory  o Respiratory  o : Clear to percussion and auscultation. Good respiratory effort.  · Cardiovascular  o Heart  o : PMI not well felt. S1, S2 regular. No S3. No S4. Negative systolic/diastolic murmur.   o Peripheral Vascular System  o :   § Extremities  § : Trace pedal edema. Good femoral and pedal pulses.   · Gastrointestinal  o Abdominal Examination  o : Soft, obese, difficult to palpate. No masses or tenderness. No hepatosplenomegaly. Abdominal aorta is not palpable.   · EKG  o EKG  o : Performed in the office today.  o Indications  o : Coronary artery disease.  o Results  o : Sinus rhythm with an unusual P axis. Low voltage, precordial leads.  o Comparison  o : Heart rate is faster than her usual heart rate.   · Labs  o Labs  o : Chemistry profile in May 2020 was normal. A1c was 6.4%. She has not had her lipids done this year.           Assessment     1.  Hypertension, needs tighter control.  2.  Coronary artery disease with occasional angina pectoris, stable.  3.  Diabetes mellitus.  4.  Chronic lung disease.  5.  Morbid obesity.  6.  Hyperlipidemia.       Plan     1.  Increase carvedilol to one-and-a-half b.i.d. of the 12.5 mg.  2.  After 1 week, she will do a 2-week blood pressure log, as well as a 2-week blood pressure log " before her        next appointment.    3.  She will get lipids done before her next appointment.  4.  Follow up in 6 months.        Mario Eaton MD, FACC  PM:vm             Electronically Signed by: Zayda Knapp-, Other -Author on July 20, 2020 10:37:57 AM  Electronically Co-signed by: Mario Eaton MD -Reviewer on July 29, 2020 04:03:53 PM

## 2021-05-15 VITALS
WEIGHT: 220 LBS | HEIGHT: 63 IN | HEART RATE: 76 BPM | SYSTOLIC BLOOD PRESSURE: 152 MMHG | BODY MASS INDEX: 38.98 KG/M2 | DIASTOLIC BLOOD PRESSURE: 54 MMHG

## 2021-05-15 VITALS
DIASTOLIC BLOOD PRESSURE: 76 MMHG | BODY MASS INDEX: 38.27 KG/M2 | HEART RATE: 76 BPM | SYSTOLIC BLOOD PRESSURE: 150 MMHG | WEIGHT: 216 LBS | HEIGHT: 63 IN

## 2021-05-15 VITALS — BODY MASS INDEX: 38.27 KG/M2 | WEIGHT: 216 LBS | OXYGEN SATURATION: 91 % | HEART RATE: 78 BPM | HEIGHT: 63 IN

## 2021-05-15 VITALS — HEIGHT: 60 IN | OXYGEN SATURATION: 92 % | HEART RATE: 87 BPM | WEIGHT: 218.5 LBS | BODY MASS INDEX: 42.9 KG/M2

## 2021-05-16 VITALS
HEART RATE: 66 BPM | SYSTOLIC BLOOD PRESSURE: 136 MMHG | DIASTOLIC BLOOD PRESSURE: 60 MMHG | WEIGHT: 216 LBS | HEIGHT: 63 IN | BODY MASS INDEX: 38.27 KG/M2

## 2021-05-16 VITALS — HEIGHT: 60 IN | WEIGHT: 214 LBS | HEART RATE: 74 BPM | BODY MASS INDEX: 42.01 KG/M2 | OXYGEN SATURATION: 98 %

## 2021-05-16 VITALS
WEIGHT: 213 LBS | HEART RATE: 72 BPM | BODY MASS INDEX: 37.74 KG/M2 | HEIGHT: 63 IN | DIASTOLIC BLOOD PRESSURE: 60 MMHG | SYSTOLIC BLOOD PRESSURE: 120 MMHG

## 2021-05-18 ENCOUNTER — HOSPITAL ENCOUNTER (OUTPATIENT)
Dept: LAB | Facility: HOSPITAL | Age: 86
Discharge: HOME | End: 2021-05-18
Attending: INTERNAL MEDICINE

## 2021-05-18 LAB
ALBUMIN SERPL-MCNC: 4 G/DL (ref 3.5–5)
ALBUMIN/GLOB SERPL: 1.2 {RATIO} (ref 1.4–2.6)
ALP SERPL-CCNC: 78 U/L (ref 43–160)
ALT SERPL-CCNC: 15 U/L (ref 10–40)
ANION GAP SERPL CALC-SCNC: 17 MMOL/L (ref 8–19)
AST SERPL-CCNC: 12 U/L (ref 15–50)
BASOPHILS # BLD AUTO: 0.06 10*3/UL (ref 0–0.2)
BASOPHILS NFR BLD AUTO: 0.5 % (ref 0–3)
BILIRUB SERPL-MCNC: 0.36 MG/DL (ref 0.2–1.3)
BUN SERPL-MCNC: 16 MG/DL (ref 5–25)
BUN/CREAT SERPL: 21 {RATIO} (ref 6–20)
CALCIUM SERPL-MCNC: 9.4 MG/DL (ref 8.7–10.4)
CHLORIDE SERPL-SCNC: 93 MMOL/L (ref 99–111)
CHOLEST SERPL-MCNC: 132 MG/DL (ref 107–200)
CHOLEST/HDLC SERPL: 2.6 {RATIO} (ref 3–6)
CONV ABS IMM GRAN: 0.07 10*3/UL (ref 0–0.2)
CONV CO2: 28 MMOL/L (ref 22–32)
CONV CREATININE URINE, RANDOM: 104.5 MG/DL (ref 10–300)
CONV IMMATURE GRAN: 0.6 % (ref 0–1.8)
CONV MICROALBUM.,U,RANDOM: 84.8 MG/L (ref 0–20)
CONV TOTAL PROTEIN: 7.3 G/DL (ref 6.3–8.2)
CREAT UR-MCNC: 0.78 MG/DL (ref 0.5–0.9)
DEPRECATED RDW RBC AUTO: 50.9 FL (ref 36.4–46.3)
EOSINOPHIL # BLD AUTO: 0.33 10*3/UL (ref 0–0.7)
EOSINOPHIL # BLD AUTO: 2.7 % (ref 0–7)
ERYTHROCYTE [DISTWIDTH] IN BLOOD BY AUTOMATED COUNT: 16.6 % (ref 11.7–14.4)
EST. AVERAGE GLUCOSE BLD GHB EST-MCNC: 146 MG/DL
GFR SERPLBLD BASED ON 1.73 SQ M-ARVRAT: >60 ML/MIN/{1.73_M2}
GLOBULIN UR ELPH-MCNC: 3.3 G/DL (ref 2–3.5)
GLUCOSE SERPL-MCNC: 140 MG/DL (ref 65–99)
HBA1C MFR BLD: 6.7 % (ref 3.5–5.7)
HCT VFR BLD AUTO: 36.4 % (ref 37–47)
HDLC SERPL-MCNC: 51 MG/DL (ref 40–60)
HGB BLD-MCNC: 11.3 G/DL (ref 12–16)
LDLC SERPL CALC-MCNC: 60 MG/DL (ref 70–100)
LYMPHOCYTES # BLD AUTO: 1.26 10*3/UL (ref 1–5)
LYMPHOCYTES NFR BLD AUTO: 10.4 % (ref 20–45)
MCH RBC QN AUTO: 26.3 PG (ref 27–31)
MCHC RBC AUTO-ENTMCNC: 31 G/DL (ref 33–37)
MCV RBC AUTO: 84.8 FL (ref 81–99)
MICROALBUMIN/CREAT UR: 81.1 MG/G{CRE} (ref 0–35)
MONOCYTES # BLD AUTO: 0.94 10*3/UL (ref 0.2–1.2)
MONOCYTES NFR BLD AUTO: 7.8 % (ref 3–10)
NEUTROPHILS # BLD AUTO: 9.42 10*3/UL (ref 2–8)
NEUTROPHILS NFR BLD AUTO: 78 % (ref 30–85)
NRBC CBCN: 0 % (ref 0–0.7)
OSMOLALITY SERPL CALC.SUM OF ELEC: 279 MOSM/KG (ref 273–304)
PLATELET # BLD AUTO: 327 10*3/UL (ref 130–400)
PMV BLD AUTO: 8.8 FL (ref 9.4–12.3)
POTASSIUM SERPL-SCNC: 4.7 MMOL/L (ref 3.5–5.3)
RBC # BLD AUTO: 4.29 10*6/UL (ref 4.2–5.4)
SODIUM SERPL-SCNC: 133 MMOL/L (ref 135–147)
TRIGL SERPL-MCNC: 105 MG/DL (ref 40–150)
VLDLC SERPL-MCNC: 21 MG/DL (ref 5–37)
WBC # BLD AUTO: 12.08 10*3/UL (ref 4.8–10.8)

## 2021-05-28 VITALS
OXYGEN SATURATION: 93 % | WEIGHT: 216.05 LBS | HEIGHT: 60 IN | HEART RATE: 70 BPM | TEMPERATURE: 97.9 F | SYSTOLIC BLOOD PRESSURE: 171 MMHG | BODY MASS INDEX: 42.42 KG/M2 | HEIGHT: 60 IN | SYSTOLIC BLOOD PRESSURE: 138 MMHG | TEMPERATURE: 97.8 F | DIASTOLIC BLOOD PRESSURE: 53 MMHG | BODY MASS INDEX: 42.98 KG/M2 | OXYGEN SATURATION: 96 % | HEART RATE: 91 BPM | WEIGHT: 218.92 LBS | DIASTOLIC BLOOD PRESSURE: 55 MMHG

## 2021-05-28 VITALS
TEMPERATURE: 98.5 F | HEART RATE: 78 BPM | OXYGEN SATURATION: 91 % | WEIGHT: 215.44 LBS | BODY MASS INDEX: 42.3 KG/M2 | HEIGHT: 60 IN | SYSTOLIC BLOOD PRESSURE: 151 MMHG | RESPIRATION RATE: 20 BRPM | DIASTOLIC BLOOD PRESSURE: 53 MMHG

## 2021-05-28 NOTE — PROGRESS NOTES
Patient: AZAM DIALLO     Acct: [id number]     Report: #[id number]  UNIT #: [id number]     : 1935    Encounter Date:2019  PRIMARY CARE: Yvon Redmond  ***Signed***  --------------------------------------------------------------------------------------------------------------------  NURSE INTAKE      Visit Type      New Patient Visit            Chief Complaint      ELEVATED WBC            Referring Provider/Copies To      Referring Provider:  Yvon Redmond      Primary Care Provider:  Yvon Redmond      Copies To:   Yvon Redmond ;            History and Present Illness      Past History      1) Leukocytosis: Primarily Neutrophila, present intermittently since  but     consistent since 10/2018            Treatment History:            1) Workup initiated 19            HPI - Hematology Interim      Ms. Diallo is an 83-year-old female with a past medical history significant     for recurrent respiratory infections and associated dyspnea who is seen     regarding a finding of leukocytosis.  She is seen for recommendations regarding     further evaluation and management.            Ms. Diallo was in her usual state of health when she saw her primary care     provider for ongoing management of respiratory infection and associated sy    mptoms.  Repeat blood work revealed a persistent leukocytosis.  As a consequence    of this, she is now referred to hematology for further evaluation and     management.            Ms. Diallo reports that she has been receiving several different medications     recently for her respiratory symptoms.  Specifically, she reports the use of     inhaled steroids.  She also reports intermittent antibiotic therapy.  Overall,     her symptoms are improving, but do persistently wax and wane.  She does     intermittently take inhaler treatment to address this.  No other modifying     factors or associated signs or symptoms.  She otherwise denies fevers or chills      or sweats.  She denies recent medication changes.  She denies chronic     infections.  She denies chronic oral steroid use.  She does report that she is     up-to-date regarding her mammograms, but has not had a colonoscopy in roughly 20    years.  She reports that she will be undergoing a CT scan to assess her     respiratory symptoms within 1 month.  She denies a history of tobacco abuse.      She also complains of arthralgias.  She describes it is achy joints of moderate     severity.  It is most prominently located in the knees.  No other modifying     factors or associated signs or symptoms.            PAST, FAMILY   Past Medical History      Past Medical History:  Arthritis, Diabetes Type 2, High Cholesterol      Hematology/Oncology (F):  Leukocytosis, Skin Cancer            Past Surgical History      Biopsy, Hysterectomy, Skin Cancer Removal            Family History      Family History:  Breast Cancer (AUNT), Lung Cancer (MOTHER)            Social History      Lives independently:  No            Tobacco Use      Tobacco status:  Never smoker            Alcohol Use      Alcohol intake:  None            Substance Use      Substance use:  Denies use            REVIEW OF SYSTEMS      General:  Denies: Appetite Change, Fatigue, Fever, Night Sweats, Weight Gain,     Weight Loss      Eye:  Denies: Blurred Vision, Corrective Lenses, Diplopia, Eye Irritation, Eye     Pain, Eye Redness, Spots in Vision, Vision Loss      ENT:  Denies: Headache, Hearing Loss, Hoarseness, Seizures, Sinus Congestion,     Sore Throat      Cardiovascular:  Denies: Chest Pain, Edema Ankles, Edema Legs, Irregular     Heartbeat, Palpitations      Respiratory:  Denies: Coughing Blood, Productive Cough, Shortness of Air,     Wheezing      Gastrointestinal:  Denies: Bloody Stools, Constipation, Diarrhea, Frequent     Heartburn, Nausea, Problem Swallowing, Tarry Stools, Unable to Control Bowels,     Vomiting      Genitourinary (female):  Denies:  Blood in Urine, Decrease Urine Stream, Frequent    Urination, Incontinence, Painful Urination      Musculoskeletal:  Admits: Painful Joints;          Denies: Back Pain, Leg Cramps, Muscle Pain, Muscle Weakness, Swollen Joints      Integumentary:  Denies: Bleeds Easily, Bruises Easily, Hair Changes, Jaundice,     Lesions, Mole Changes, Nail Changes, Pigment Changes, Rash, Skin Discoloration      Neurologic:  Denies: Dizziness, Fainting, Numbness\Tingling, Paralysis, Seizures      Psychiatric:  Denies: Anxiety, Confused, Depression, Disoriented, Memory Loss      Endocrine:  Denies: Cold Intolerance, Diabetes, Excessive Sweating, Excessive     Thirst, Excessive Urination, Heat Intolerance, Flushing, Hyperthyroidism,     Hypothyroidism      Hematologic/Lymphatic:  Denies: Bruising, Bleeding, Enlarged Lymph Nodes,     Recurrent Infections, Transfusions      Reproductive:  Denies: Menopause, Heavy Periods, Pregnant, Still Menstruating            VITAL SIGNS AND SCORES      Vitals      Height 4 ft 11.65 in / 151.5 cm      Weight 218 lbs 14.668 oz / 99.3 kg      BSA 1.93 m2      BMI 43.3 kg/m2      Temperature 97.9 F / 36.61 C - Temporal      Pulse 70      Blood Pressure 138/53 Sitting, Left Arm      Pulse Oximetry 96%, ROOM AIR            Pain Score      Experiencing any pain?:  Yes      Pain Scale Used:  Numerical      Pain Intensity:  2            Fatigue Score      Experiencing any fatigue?:  No      Fatigue (0-10 scale):  0 (none)            EXAM      General Appearance:  Positive for: Alert, Oriented x3      Eye:  Positive for: Anicteric Sclerae, PERRLA      HEENT:  Negative for: Scleral Icterus, Thrush      Neck:  Positive for: Supple      Respiratory:  Negative for: Rales, Rhochi      Abdomen/Gastro:  Positive for: Soft;          Negative for: Hepatosplenomegaly      Cardiovascular:  Positive for: RRR;          Negative for: Murmur, Rub      Skin:  Negative for: Induration, Lesions      Psychiatric:  Positive for:  AAO X 3, Appropriate Affect      Lower Extremities:  Negative for: Edema            PREVENTION      Hx Influenza Vaccination:  Yes      Date Influenza Vaccine Given:  Nov 1, 2018      Influenza Vaccine Declined:  No      2 or More Falls Past Year?:  No      Fall Past Year with Injury?:  No      Hx Pneumococcal Vaccination:  Yes      Encouraged to follow-up with:  PCP regarding preventative exams.      Chart initiated by      JOSÉ MIGUEL ESPINOSA [facility]            ALLERGY/MEDS      Allergies      Coded Allergies:             AMOXICILLIN (Verified  Allergy, Unknown, HIVES, 6/26/19)           CIPROFLOXACIN (Verified  Allergy, Unknown, HIVES, 6/26/19)           CIPROFLOXACIN HCL (Verified  Allergy, Unknown, HIVES, 6/26/19)           CLAVULANIC ACID (Verified  Allergy, Unknown, HIVES, 6/26/19)           PENICILLINS (Verified  Allergy, Unknown, HIVES, 6/26/19)           POTASSIUM CLAVULANATE (Verified  Allergy, Unknown, HIVES, 6/26/19)           BETALACTAMS (Verified  Adverse Reaction, Unknown, NAUSEA, 6/26/19)           SULFA (SULFONAMIDE ANTIBIOTICS) (Verified  Adverse Reaction, Unknown,     NAUSEA, 6/26/19)           SULFAMETHOXAZOLE (Verified  Adverse Reaction, Unknown, NAUSEA, 6/26/19)           TRIMETHOPRIM (Verified  Adverse Reaction, Unknown, NAUSEA, 6/26/19)            Medications      Last Reconciled on 6/13/19 14:28 by CLAUDIO BANKS MD      Losartan Potassium (Losartan*) 50 Mg Tablet      50 MG PO BID, #60 TAB 0 Refills         Reported         6/13/19       Spacer (Spacer) 1 Each Each      EACH XX ONCE, #1 0 Refills         Prov: CLAUDIO BANKS         5/28/19       Budesonide/Formoterol Fumarate (Symbicort 160/4.5 Mcg) 10.2 Gm Inh      2 PUFF INH RTBID, #1 INH 1 Refill         Prov: CLAUDIO BANKS         5/28/19       MDI-Albuterol (Proair HFA) 8.5 Gm Hfa.aer.ad      2 PUFFS INH Q6H PRN for SHORTNESS OF BREATH, #1 MDI 3 Refills         Prov: CLAUDIO BANKS         4/19/19       Cetirizine Hcl (ZyrTEC) 10 Mg Tablet       10 MG PO QDAY, #24 TAB 0 Refills         Prov: Wayne Bonilla cfr         11/23/18       amLODIPine (amLODIPine) 5 Mg Tablet      5 MG PO QDAY, #30 TAB         Reported         11/23/18       sitaGLIPtin (Januvia*) 50 Mg Tablet      50 MG PO QDAY, TAB         Reported         7/14/18       Cholecalciferol (Vitamin D3*) 1,000 Unit Tab      1000 UNITS PO QDAY, #30 TAB 0 Refills         Reported         7/14/18       Montelukast Sodium (Singulair*) 10 Mg Tab      10 MG PO QDAY, #30 TAB 0 Refills         Reported         7/14/17       Glimepiride (Glimepiride*) 2 Mg Tablet      2 MG PO BID, TAB         Reported         7/14/17       Multivitamin/Iron/Folic Acid (CENTRUM COMPLETE MULTIVIT TAB) 1 Tab Tablet      1 TAB PO QPM, #30 TAB 0 Refills         Reported         7/14/17       Pantoprazole (Protonix*) 40 Mg Tablet.dr      40 MG PO QDAY@07, #30 TAB 0 Refills         Reported         7/14/17       Carvedilol (Carvedilol) 12.5 Mg Tablet      12.5 MG PO BID, #60 TAB 0 Refills         Reported         7/14/17       Oxybutynin Chloride (Ditropan) 5 Mg Tablet      5 MG PO QAM, TAB         Reported         7/14/17       Metformin Hcl* (Metformin Hcl*) 850 Mg Tablet      850 MG PO BID         Reported         12/27/11       Aspirin (Aspirin Low-Strength 81 Mg) 81 Mg Tab      81 MG PO QPM, TAB 0 Refills         Reported         7/20/10       Paroxetine Hcl (Paxil*) 40 Mg Tablet      40 MG PO QPM, TAB 0 Refills         Reported         7/20/10      Medications Reviewed:  Changes made to meds            IMPRESSION/PLAN      Impression      1) Leukocytosis: Ms. Diallo is an 83-year-old female with a finding of     leukocytosis.  I had an extensive discussion today with her regarding this     finding.  Historically, she has not had additional myelosuppression present.  I     did discuss with her the finding of leukocytosis and that it represents an     elevated white blood cell count.  In her case, it is predominantly a      neutrophilia.  I explained to her that this is often due to benign etiologies     such as rheumatologic conditions, medication side effect, or chronic infection.     However, it could also be reactive to an underlying malignancy.  As a     consequence, I think it is reasonable to proceed with a CT scan of the abdomen     and pelvis in addition to the chest to evaluate for occult malignancy.  If     negative, I would otherwise recommend testing with BCR able to rule out CML.  I     would otherwise recommend surveillance for this issue with a CBC every 3 to 6     months with primary care.  She indicates understanding and is amenable to this     plan.            2) Anemia: This is of mild severity, but new as of recently.  She is     intermittently been anemic in the past.  I would recommend an anemia work-up if     this persists.            3) DM: She will follow with her primary care provider regarding this issue.            Diagnosis      Leukocytosis         Leukemoid reaction - D72.823         Leukocytosis type: leukemoid reaction            Granulocytosis - D72.828            Anemia         Anemia in other chronic diseases classified elsewhere - D63.8         Other causes of anemia: chronic disease, other            Notes      New Diagnostics      * CBC With Auto Diff, Routine         Dx: Leukocytosis - D72.829      * CMP Comp Metabolic Panel, Routine         Dx: Leukocytosis - D72.829      * LDH, Routine         Dx: Leukocytosis - D72.829      * Bcr-Abl1 Hoff Det Fo, Routine         Dx: Leukocytosis - D72.829      * CRP HIGH SENSITIVE, Routine         Dx: Leukocytosis - D72.829      * Sed Rate, Routine         Dx: Leukocytosis - D72.829      * IMMUNOGLOBULIN QUANT IGAMQ, Routine         Dx: Leukocytosis - D72.829      * Abd/Pel W/ Cont CT, 07/15/19         Dx: Leukocytosis - D72.829            Plan      1) Workup toda including cbc/cmp/esr/crp and BCR-ABL            2) CT CAP            3) If workup (-) would  recommend surveillance with cbc q 3-6 months            4) RTC to review (+) results as necessary            ADDENDUM: Patient found to be anemic today; would recommend anemia workup at     this time            Patient Education      Patient Education Provided:  Yes                 Disclaimer: Converted document may not contain table formatting or lab diagrams. Please see [employer] System for the authenticated document.

## 2021-05-28 NOTE — PROGRESS NOTES
Patient: AZAM MCINTYRE     Acct: [id number]     Report: #[id number]  UNIT #: [id number]     : 1935    Encounter Date:2019  PRIMARY CARE: Yvon Redmond  ***Signed***  --------------------------------------------------------------------------------------------------------------------  Chief Complaint      Encounter Date      2019            Primary Care Provider      Yvon Redmond            Referring Provider      Yvon Redmond            Patient Complaint      Patient is complaining of      f/u ct scan results            VITALS      Height 5 ft 0 in / 152.4 cm      Weight 215 lbs 7.000 oz / 97.815649 kg      BSA 1.93 m2      BMI 42.1 kg/m2      Temperature 98.5 F / 36.94 C - Oral      Pulse 78      Respirations 20      Blood Pressure 151/53 Sitting, Right Arm      Pulse Oximetry 91%, roomair      Initial Exhaled Nitrous Oxide      Date:  2019      Exhaled Nitrous Oxide Results:  61            HPI      The patient is a very pleasant 83 year old morbidly obese  female here     for follow up.  Since last visit she did have a CBC and IgE that were showing     peripheral eosinophilia and a mild elevated IgE. PFTs were completely     unremarkable. She has a chest CT scheduled for July.  She is on Symbicort and     after about two days of taking Symbicort had a complete resolution of symptoms.     She has not taken Symbicort in the last few days and still has no symptoms.  She    denies any dyspnea, cough, wheeze, chest pain or hemoptysis. Denies any nausea,     vomiting, fevers, chills, headaches or chest pain. She feels she likely had     pneumonia and then it got treated and with the inhaler she is doing well.  She     is able to perform her ADLs without difficulty.  Denies any swollen glands or     lymph nodes of the head and neck.            I have personally reviewed the review of systems, past family, social, surgical     and medical histories and I agree with the  findings.            ROS      Constitutional:  Denies: Fatigue, Fever, Weight gain, Weight loss, Chills,     Insomnia, Other      Respiratory/Breathing:  Complains of: Shortness of air; Denies: Wheezing, Cough,    Hemoptysis, Pleuritic pain, Other      Endocrine:  Denies: Polydipsia, Polyuria, Heat/cold intolerance, Abnorml     menstrual pattern, Diabetes, Other      Eyes:  Denies: Blurred vision, Vision Changes, Other      Ears, nose, mouth, throat:  Denies: Mouth lesions, Thrush, Throat pain,     Hoarseness, Allergies/Hay Fever, Post Nasal Drip, Headaches, Recent Head Injury,    Nose Bleeding, Neck Stiffness, Thyroid Mass, Hearing Loss, Ear Fullness, Dry     Mouth, Nasal or Sinus Pain, Dry Lips, Nasal discharge, Nasal congestion, Other      Cardiovascular:  Complains of: Dyspnea on Exertion; Denies: Palpitations,     Syncope, Claudication, Chest Pain, Wake up Gasping for air, Leg Swelling,     Irregular Heart Rate, Cyanosis, Other      Genitourinary:  Denies: Urinary frequency, Incontinence, Hematuria, Urgency,     Nocturia, Dysuria, Testicular problems, Other      Musculoskeletal:  Denies: Joint Pain, Joint Stiffness, Joint Swelling, Myalgias,    Other      Hematologic/lymphatic:  DENIES: Lymphadenopathy, Bruising, Bleeding tendencies,     Other      Neurological:  Denies: Headache, Numbness, Weakness, Seizures, Other      Psychiatric:  Denies: Anxiety, Appropriate Effect, Depression, Other      Sleep:  No: Excessive daytime sleep, Morning Headache?, Snoring, Insomnia?, Stop    breathing at sleep?, Other      Integumentary:  Denies: Rash, Dry skin, Skin Warm to Touch, Other      Immunologic/Allergic:  Denies: Latex allergy, Seasonal allergies, Asthma,     Urticaria, Eczema, Other      Immunization status:  No: Up to date            FAMILY/SOCIAL/MEDICAL HX      Surgical History:  Yes: Bowel Surgery (COLONOSCOPY), Head Surgery (TUBES IN     EARS), Orthopedic Surgery (ROTARY CUFF RIGHT SHOULDER), Other Surgeries;  No:     Abdominal Surgery, Appendectomy, Bladder Surgery, CABG, Cholecystectomy, Oral     Surgery, Vascular Surgery      Diabetes - Family Hx:  Brother, Aunt      Cancer/Type - Family Hx:  Child, Aunt (breast)      Is Father Still Living?:  No      Is Mother Still Living?:  No       Family History:  Yes      Social History:  No Tobacco Use, No Alcohol Use, No Recreational Drug use      Smoking status:  Never smoker      Anticoagulation Therapy:  No      Antibiotic Prophylaxis:  No      Medical History:  Yes: Arthritis, Deafness or Ringing Ears (LEFT ONE WITH PT     FAMILY TOOK RIGHT ONE HOME), Diabetes (TYPE II NIDDM), Hemorrhoids/Rectal Prob     (HIATAL HERNIA), High Blood Pressure (MEDS CONTROL), Shortness Of Breath (HX     BRONCHITIS); No: Asthma, Blood Disease, Chemotherapy/Cancer, Chronic     Bronchitis/COPD, Congestive Heart Failu, Seizures, Heart Attack, Sinus Trouble,     Miscellaneous Medical/oth      Psychiatric History      none            PREVENTION      Hx Influenza Vaccination:  Yes      Date Influenza Vaccine Given:  Nov 1, 2018      Influenza Vaccine Declined:  No      2 or More Falls Past Year?:  No      Fall Past Year with Injury?:  No      Hx Pneumococcal Vaccination:  Yes      Encouraged to follow-up with:  PCP regarding preventative exams.      Chart initiated by      brian/ ma            ALLERGIES/MEDICATIONS      Allergies:        Coded Allergies:             AMOXICILLIN (Verified  Allergy, Unknown, HIVES, 6/13/19)           CIPROFLOXACIN (Verified  Allergy, Unknown, HIVES, 6/13/19)           CIPROFLOXACIN HCL (Verified  Allergy, Unknown, HIVES, 6/13/19)           CLAVULANIC ACID (Verified  Allergy, Unknown, HIVES, 6/13/19)           PENICILLINS (Verified  Allergy, Unknown, HIVES, 6/13/19)           POTASSIUM CLAVULANATE (Verified  Allergy, Unknown, HIVES, 6/13/19)           BETALACTAMS (Verified  Adverse Reaction, Unknown, NAUSEA, 6/13/19)           SULFA (SULFONAMIDE ANTIBIOTICS)  (Verified  Adverse Reaction, Unknown,     NAUSEA, 6/13/19)           SULFAMETHOXAZOLE (Verified  Adverse Reaction, Unknown, NAUSEA, 6/13/19)           TRIMETHOPRIM (Verified  Adverse Reaction, Unknown, NAUSEA, 6/13/19)      Medications    Last Reconciled on 6/13/19 14:28 by CLAUDIO BANKS MD      Losartan Potassium (Losartan*) 50 Mg Tablet      50 MG PO BID, #60 TAB 0 Refills         Reported         6/13/19       Spacer (Spacer) 1 Each Each      EACH XX ONCE, #1 0 Refills         Prov: CLAUDIO BANKS         5/28/19       Budesonide/Formoterol Fumarate (Symbicort 160/4.5 Mcg) 10.2 Gm Inh      2 PUFF INH RTBID, #1 INH 1 Refill         Prov: CLAUDIO BANKS         5/28/19       MDI-Albuterol (Proair HFA) 8.5 Gm Hfa.aer.ad      2 PUFFS INH Q6H PRN for SHORTNESS OF BREATH, #1 MDI 3 Refills         Prov: CLAUDIO BANKS         4/19/19       Cetirizine Hcl (ZyrTEC) 10 Mg Tablet      10 MG PO QDAY, #24 TAB 0 Refills         Prov: Wayne Bonilla cfr         11/23/18       amLODIPine (amLODIPine) 5 Mg Tablet      5 MG PO QDAY, #30 TAB         Reported         11/23/18       sitaGLIPtin (Januvia*) 50 Mg Tablet      50 MG PO QDAY, TAB         Reported         7/14/18       Cholecalciferol (Vitamin D3*) 1,000 Unit Tab      1000 UNITS PO QDAY, #30 TAB 0 Refills         Reported         7/14/18       Montelukast Sodium (Singulair*) 10 Mg Tab      10 MG PO QDAY, #30 TAB 0 Refills         Reported         7/14/17       Glimepiride (Glimepiride*) 2 Mg Tablet      2 MG PO BID, TAB         Reported         7/14/17       Cetirizine Hcl (CETIRIZINE HCL) 10 Mg Tablet      10 MG PO QDAY, #30 TAB 0 Refills         Reported         7/14/17       Multivitamin/Iron/Folic Acid (CENTRUM COMPLETE MULTIVIT TAB) 1 Tab Tablet      1 TAB PO QPM, #30 TAB 0 Refills         Reported         7/14/17       Pantoprazole (Protonix*) 40 Mg Tablet.dr      40 MG PO QDAY@07, #30 TAB 0 Refills         Reported         7/14/17       Carvedilol (Carvedilol) 12.5  Mg Tablet      12.5 MG PO BID, #60 TAB 0 Refills         Reported         7/14/17       Oxybutynin Chloride (Ditropan) 5 Mg Tablet      5 MG PO QAM, TAB         Reported         7/14/17       Metformin Hcl* (Metformin Hcl*) 850 Mg Tablet      850 MG PO BID         Reported         12/27/11       Aspirin (Aspirin Low-Strength 81 Mg) 81 Mg Tab      81 MG PO QPM, TAB 0 Refills         Reported         7/20/10       Paroxetine Hcl (Paxil*) 40 Mg Tablet      40 MG PO QPM, TAB 0 Refills         Reported         7/20/10      Current Medications      Current Medications Reviewed 6/13/19            EXAM      Vital Signs Reviewed.      General:  WDWN, Alert, NAD.      HEENT: PERRL, EOMI.  OP, nares clear, no sinus tenderness.      Chest: Good aeration, clear to auscultation bilaterally, tympanic to percussion     bilaterally, no work of breathing noted.      CV: RRR, no MGR, pulses 2+, equal.        Abd: Obese, soft, NT, ND, +BS, no HSM.      EXT: No clubbing, no cyanosis, no edema.        Neuro:  A  Skin: No rashes or lesions.      Vtials      Vitals:             Height 5 ft 0 in / 152.4 cm           Weight 215 lbs 7.000 oz / 97.264973 kg           BSA 1.93 m2           BMI 42.1 kg/m2           Temperature 98.5 F / 36.94 C - Oral           Pulse 78           Respirations 20           Blood Pressure 151/53 Sitting, Right Arm           Pulse Oximetry 91%, roomair            REVIEW      Results Reviewed      PCCS Results Reviewed?:  Yes Prev Lab Results, Yes Prev Radiology Results, Yes     Previous [facility] Records      Lab Results      I reviewed my last office note. I personally reviewed PFTs. I reviewed six     minute walk test showing no O2 desaturation with submaximal exertion. I reviewed    CBC and IgE.  There were 390 peripheral eosinophils and IgE was mildly elevated     at 29.            Assessment      Notes      New Medications      * Losartan Potassium (Losartan*) 50 MG TABLET: 50 MG PO BID #60      *  Budesonide/Formoterol Fumarate (Symbicort 160/4.5 Mcg) 10.2 GM INH: 2 PUFF INH      RTBID #1         Instructions: to replace advair         Dx: Cough - R05      Renewed Medications      * Budesonide/Formoterol Fumarate (Symbicort 160/4.5 Mcg) 10.2 GM INH: 2 PUFF INH      RTBID #1         Instructions: to replace advair      Discontinued Medications      * Doxycycline Monohydrate 100 MG TABLET: 100 MG PO BID #20      * Fluticasone/Vilanterol 200-25 Mcg Inh (Breo Ellipta 200-25 Mcg Inh) 1 EACH       BLST.W.DEV          Sample - Qty 2      * Nystatin (Nystatin*) 100,000 UNIT/1 ML ORAL.SUSP: 10 ML SWISH.SWAL Q6HR #120      IMPRESSION:      1.  Dyspnea, resolved.      2. Cough, resolved.      3. Wheeze, resolved.      4.  Peripheral eosinophilia.        5. Seasonal allergies, well-controlled.      6. Allergic rhinitis, well-controlled.      7.  Dilated esophagus on chest CT.      8. Ground glass nodule on chest CT.      9. Morbid obesity with BMI 42.1.               PLAN:      1.  The patient's CT is likely a ground glass nodule related to infection.  She     has a repeat due in July, we will follow up on his this.      2. The patient would like to stop her inhalers as she is no longer having     respiratory symptoms and felt this was secondary to infection.  I told her it     was okay to stop it and do a trial and she how she does. If she has worsening     symptoms, I would like for her to go back on Symbicort.  She is amendable to     this plan.      3. Continue Singulair and Zyrtec.      4. Continue PPI.      5. Encourage diet, exercise and weight loss.      6. Up-to-date with flu, Prevnar and Pneumovax.      7.  Follow up with me as needed.                 Disclaimer: Converted document may not contain table formatting or lab diagrams. Please see [employer] for the authenticated document.

## 2021-06-14 ENCOUNTER — LAB (OUTPATIENT)
Dept: LAB | Facility: HOSPITAL | Age: 86
End: 2021-06-14

## 2021-06-14 ENCOUNTER — TRANSCRIBE ORDERS (OUTPATIENT)
Dept: LAB | Facility: HOSPITAL | Age: 86
End: 2021-06-14

## 2021-06-14 DIAGNOSIS — D72.9 WHITE BLOOD CELL DISORDER: ICD-10-CM

## 2021-06-14 DIAGNOSIS — E11.9 TYPE 2 DIABETES MELLITUS WITHOUT COMPLICATION, WITHOUT LONG-TERM CURRENT USE OF INSULIN (HCC): Primary | ICD-10-CM

## 2021-06-14 DIAGNOSIS — I50.9 HEART FAILURE, UNSPECIFIED HF CHRONICITY, UNSPECIFIED HEART FAILURE TYPE (HCC): ICD-10-CM

## 2021-06-14 DIAGNOSIS — R06.02 SHORTNESS OF BREATH: ICD-10-CM

## 2021-06-14 DIAGNOSIS — E11.9 TYPE 2 DIABETES MELLITUS WITHOUT COMPLICATION, WITHOUT LONG-TERM CURRENT USE OF INSULIN (HCC): ICD-10-CM

## 2021-06-14 LAB
ALBUMIN SERPL-MCNC: 4.1 G/DL (ref 3.5–5.2)
ALBUMIN/GLOB SERPL: 1.4 G/DL
ALP SERPL-CCNC: 73 U/L (ref 39–117)
ALT SERPL W P-5'-P-CCNC: 9 U/L (ref 1–33)
ANION GAP SERPL CALCULATED.3IONS-SCNC: 13.2 MMOL/L (ref 5–15)
AST SERPL-CCNC: 9 U/L (ref 1–32)
BASOPHILS # BLD AUTO: 0.04 10*3/MM3 (ref 0–0.2)
BASOPHILS NFR BLD AUTO: 0.3 % (ref 0–1.5)
BILIRUB SERPL-MCNC: 0.3 MG/DL (ref 0–1.2)
BUN SERPL-MCNC: 23 MG/DL (ref 8–23)
BUN/CREAT SERPL: 21.3 (ref 7–25)
CALCIUM SPEC-SCNC: 9.5 MG/DL (ref 8.6–10.5)
CHLORIDE SERPL-SCNC: 95 MMOL/L (ref 98–107)
CO2 SERPL-SCNC: 28.8 MMOL/L (ref 22–29)
CREAT SERPL-MCNC: 1.08 MG/DL (ref 0.57–1)
DEPRECATED RDW RBC AUTO: 46.9 FL (ref 37–54)
EOSINOPHIL # BLD AUTO: 0.37 10*3/MM3 (ref 0–0.4)
EOSINOPHIL NFR BLD AUTO: 2.9 % (ref 0.3–6.2)
ERYTHROCYTE [DISTWIDTH] IN BLOOD BY AUTOMATED COUNT: 14.9 % (ref 12.3–15.4)
GFR SERPL CREATININE-BSD FRML MDRD: 48 ML/MIN/1.73
GLOBULIN UR ELPH-MCNC: 3 GM/DL
GLUCOSE SERPL-MCNC: 143 MG/DL (ref 65–99)
HCT VFR BLD AUTO: 37.5 % (ref 34–46.6)
HGB BLD-MCNC: 11.8 G/DL (ref 12–15.9)
IMM GRANULOCYTES # BLD AUTO: 0.05 10*3/MM3 (ref 0–0.05)
IMM GRANULOCYTES NFR BLD AUTO: 0.4 % (ref 0–0.5)
IRON 24H UR-MRATE: 42 MCG/DL (ref 37–145)
LYMPHOCYTES # BLD AUTO: 1.32 10*3/MM3 (ref 0.7–3.1)
LYMPHOCYTES NFR BLD AUTO: 10.3 % (ref 19.6–45.3)
MCH RBC QN AUTO: 26.9 PG (ref 26.6–33)
MCHC RBC AUTO-ENTMCNC: 31.5 G/DL (ref 31.5–35.7)
MCV RBC AUTO: 85.6 FL (ref 79–97)
MONOCYTES # BLD AUTO: 0.88 10*3/MM3 (ref 0.1–0.9)
MONOCYTES NFR BLD AUTO: 6.8 % (ref 5–12)
NEUTROPHILS NFR BLD AUTO: 10.21 10*3/MM3 (ref 1.7–7)
NEUTROPHILS NFR BLD AUTO: 79.3 % (ref 42.7–76)
NRBC BLD AUTO-RTO: 0 /100 WBC (ref 0–0.2)
NT-PROBNP SERPL-MCNC: 72.2 PG/ML (ref 0–1800)
PLATELET # BLD AUTO: 360 10*3/MM3 (ref 140–450)
PMV BLD AUTO: 9.2 FL (ref 6–12)
POTASSIUM SERPL-SCNC: 4.7 MMOL/L (ref 3.5–5.2)
PROT SERPL-MCNC: 7.1 G/DL (ref 6–8.5)
RBC # BLD AUTO: 4.38 10*6/MM3 (ref 3.77–5.28)
SODIUM SERPL-SCNC: 137 MMOL/L (ref 136–145)
WBC # BLD AUTO: 12.87 10*3/MM3 (ref 3.4–10.8)

## 2021-06-14 PROCEDURE — 83880 ASSAY OF NATRIURETIC PEPTIDE: CPT

## 2021-06-14 PROCEDURE — 80053 COMPREHEN METABOLIC PANEL: CPT

## 2021-06-14 PROCEDURE — 85025 COMPLETE CBC W/AUTO DIFF WBC: CPT

## 2021-06-14 PROCEDURE — 83540 ASSAY OF IRON: CPT

## 2021-06-14 PROCEDURE — 36415 COLL VENOUS BLD VENIPUNCTURE: CPT

## 2021-06-22 RX ORDER — LOSARTAN POTASSIUM 50 MG/1
TABLET ORAL
Qty: 180 TABLET | Refills: 0 | Status: SHIPPED | OUTPATIENT
Start: 2021-06-22 | End: 2021-09-20

## 2021-07-26 RX ORDER — PAROXETINE HYDROCHLORIDE 40 MG/1
TABLET, FILM COATED ORAL
Qty: 90 TABLET | Refills: 3 | Status: SHIPPED | OUTPATIENT
Start: 2021-07-26 | End: 2021-07-27

## 2021-07-27 RX ORDER — PAROXETINE HYDROCHLORIDE 40 MG/1
TABLET, FILM COATED ORAL
Qty: 30 TABLET | Refills: 5 | Status: SHIPPED | OUTPATIENT
Start: 2021-07-27 | End: 2021-09-22 | Stop reason: SDUPTHER

## 2021-08-17 RX ORDER — PRAVASTATIN SODIUM 80 MG/1
TABLET ORAL
Qty: 90 TABLET | Refills: 1 | Status: SHIPPED | OUTPATIENT
Start: 2021-08-17 | End: 2021-09-22 | Stop reason: SDUPTHER

## 2021-09-16 RX ORDER — LOSARTAN POTASSIUM 50 MG/1
TABLET ORAL
Qty: 144 TABLET | OUTPATIENT
Start: 2021-09-16

## 2021-09-20 RX ORDER — LOSARTAN POTASSIUM 50 MG/1
TABLET ORAL
Qty: 60 TABLET | Refills: 0 | Status: SHIPPED | OUTPATIENT
Start: 2021-09-20 | End: 2021-10-01

## 2021-09-21 ENCOUNTER — LAB (OUTPATIENT)
Dept: LAB | Facility: HOSPITAL | Age: 86
End: 2021-09-21

## 2021-09-21 ENCOUNTER — TRANSCRIBE ORDERS (OUTPATIENT)
Dept: LAB | Facility: HOSPITAL | Age: 86
End: 2021-09-21

## 2021-09-21 DIAGNOSIS — Z79.899 ENCOUNTER FOR LONG-TERM (CURRENT) USE OF OTHER MEDICATIONS: ICD-10-CM

## 2021-09-21 DIAGNOSIS — E78.5 HYPERLIPIDEMIA, UNSPECIFIED HYPERLIPIDEMIA TYPE: ICD-10-CM

## 2021-09-21 DIAGNOSIS — E78.5 HYPERLIPIDEMIA, UNSPECIFIED HYPERLIPIDEMIA TYPE: Primary | ICD-10-CM

## 2021-09-21 LAB
ALBUMIN SERPL-MCNC: 4.1 G/DL (ref 3.5–5.2)
ALBUMIN/GLOB SERPL: 1.3 G/DL
ALP SERPL-CCNC: 68 U/L (ref 39–117)
ALT SERPL W P-5'-P-CCNC: 14 U/L (ref 1–33)
ANION GAP SERPL CALCULATED.3IONS-SCNC: 10.6 MMOL/L (ref 5–15)
AST SERPL-CCNC: 12 U/L (ref 1–32)
BILIRUB SERPL-MCNC: 0.3 MG/DL (ref 0–1.2)
BUN SERPL-MCNC: 32 MG/DL (ref 8–23)
BUN/CREAT SERPL: 24.2 (ref 7–25)
CALCIUM SPEC-SCNC: 10 MG/DL (ref 8.6–10.5)
CHLORIDE SERPL-SCNC: 95 MMOL/L (ref 98–107)
CHOLEST SERPL-MCNC: 147 MG/DL (ref 0–200)
CO2 SERPL-SCNC: 31.4 MMOL/L (ref 22–29)
CREAT SERPL-MCNC: 1.32 MG/DL (ref 0.57–1)
GFR SERPL CREATININE-BSD FRML MDRD: 38 ML/MIN/1.73
GLOBULIN UR ELPH-MCNC: 3.1 GM/DL
GLUCOSE SERPL-MCNC: 140 MG/DL (ref 65–99)
HDLC SERPL-MCNC: 46 MG/DL (ref 40–60)
LDLC SERPL CALC-MCNC: 78 MG/DL (ref 0–100)
LDLC/HDLC SERPL: 1.64 {RATIO}
POTASSIUM SERPL-SCNC: 5 MMOL/L (ref 3.5–5.2)
PROT SERPL-MCNC: 7.2 G/DL (ref 6–8.5)
SODIUM SERPL-SCNC: 137 MMOL/L (ref 136–145)
TRIGL SERPL-MCNC: 128 MG/DL (ref 0–150)
VLDLC SERPL-MCNC: 23 MG/DL (ref 5–40)

## 2021-09-21 PROCEDURE — 36415 COLL VENOUS BLD VENIPUNCTURE: CPT

## 2021-09-21 PROCEDURE — 80061 LIPID PANEL: CPT

## 2021-09-21 PROCEDURE — 80053 COMPREHEN METABOLIC PANEL: CPT

## 2021-09-22 ENCOUNTER — OFFICE VISIT (OUTPATIENT)
Dept: CARDIOLOGY | Facility: CLINIC | Age: 86
End: 2021-09-22

## 2021-09-22 ENCOUNTER — TELEPHONE (OUTPATIENT)
Dept: INTERNAL MEDICINE | Facility: CLINIC | Age: 86
End: 2021-09-22

## 2021-09-22 VITALS
HEIGHT: 60 IN | DIASTOLIC BLOOD PRESSURE: 38 MMHG | SYSTOLIC BLOOD PRESSURE: 122 MMHG | BODY MASS INDEX: 44.37 KG/M2 | WEIGHT: 226 LBS | HEART RATE: 51 BPM

## 2021-09-22 DIAGNOSIS — I25.10 ATHEROSCLEROSIS OF NATIVE CORONARY ARTERY OF NATIVE HEART WITHOUT ANGINA PECTORIS: Primary | Chronic | ICD-10-CM

## 2021-09-22 DIAGNOSIS — F41.9 ANXIETY: ICD-10-CM

## 2021-09-22 DIAGNOSIS — N18.2 STAGE 2 CHRONIC KIDNEY DISEASE: Chronic | ICD-10-CM

## 2021-09-22 DIAGNOSIS — I10 ESSENTIAL HYPERTENSION: Chronic | ICD-10-CM

## 2021-09-22 DIAGNOSIS — E78.00 PURE HYPERCHOLESTEROLEMIA: Chronic | ICD-10-CM

## 2021-09-22 PROCEDURE — 99214 OFFICE O/P EST MOD 30 MIN: CPT | Performed by: NURSE PRACTITIONER

## 2021-09-22 RX ORDER — MONTELUKAST SODIUM 10 MG/1
TABLET ORAL
COMMUNITY
Start: 2021-09-16

## 2021-09-22 RX ORDER — LOSARTAN POTASSIUM 50 MG/1
50 TABLET ORAL EVERY 12 HOURS SCHEDULED
COMMUNITY
End: 2021-09-22 | Stop reason: SDUPTHER

## 2021-09-22 RX ORDER — PRAVASTATIN SODIUM 80 MG/1
80 TABLET ORAL EVERY 24 HOURS
COMMUNITY
End: 2021-09-22 | Stop reason: SDUPTHER

## 2021-09-22 RX ORDER — CETIRIZINE HYDROCHLORIDE 10 MG/1
10 TABLET ORAL DAILY
COMMUNITY

## 2021-09-22 RX ORDER — OXYBUTYNIN CHLORIDE 5 MG/1
5 TABLET ORAL DAILY
COMMUNITY
End: 2022-03-14

## 2021-09-22 RX ORDER — GLIMEPIRIDE 2 MG/1
TABLET ORAL
COMMUNITY
End: 2021-10-01

## 2021-09-22 RX ORDER — POTASSIUM CHLORIDE 750 MG/1
10 CAPSULE, EXTENDED RELEASE ORAL DAILY
COMMUNITY
Start: 2021-09-18 | End: 2021-12-20

## 2021-09-22 RX ORDER — ALBUTEROL SULFATE 90 UG/1
AEROSOL, METERED RESPIRATORY (INHALATION)
COMMUNITY
End: 2021-11-17 | Stop reason: SDUPTHER

## 2021-09-22 RX ORDER — PRAVASTATIN SODIUM 80 MG/1
80 TABLET ORAL DAILY
Qty: 90 TABLET | Refills: 1 | Status: SHIPPED | OUTPATIENT
Start: 2021-09-22 | End: 2022-10-19 | Stop reason: SDUPTHER

## 2021-09-22 RX ORDER — MULTIPLE VITAMINS W/ MINERALS TAB 9MG-400MCG
1 TAB ORAL DAILY
COMMUNITY

## 2021-09-22 RX ORDER — FUROSEMIDE 20 MG/1
20 TABLET ORAL EVERY 12 HOURS SCHEDULED
COMMUNITY
Start: 2021-05-20 | End: 2021-12-13

## 2021-09-22 RX ORDER — CARVEDILOL 12.5 MG/1
12.5 TABLET ORAL
COMMUNITY
End: 2022-02-14

## 2021-09-22 RX ORDER — PAROXETINE HYDROCHLORIDE 40 MG/1
40 TABLET, FILM COATED ORAL DAILY
Qty: 30 TABLET | Refills: 5 | Status: SHIPPED | OUTPATIENT
Start: 2021-09-22 | End: 2022-10-19 | Stop reason: SDUPTHER

## 2021-09-22 RX ORDER — PAROXETINE HYDROCHLORIDE 40 MG/1
TABLET, FILM COATED ORAL
COMMUNITY
End: 2021-09-22 | Stop reason: SDUPTHER

## 2021-09-22 RX ORDER — AMLODIPINE BESYLATE 5 MG/1
5 TABLET ORAL DAILY
COMMUNITY
Start: 2021-09-17 | End: 2022-02-28

## 2021-09-22 NOTE — ASSESSMENT & PLAN NOTE
Controlled.  Continue losartan 50 mg a day, Lasix 20 mg twice daily, amlodipine 5 mg and carvedilol 12.5 mg 1 1/2 tabs twice daily

## 2021-09-22 NOTE — TELEPHONE ENCOUNTER
I went ahead and sent in the Brio Ellipta inhaler to her pharmacy hopefully that'll be the one she needs just let her know I have already sent it in

## 2021-09-22 NOTE — PROGRESS NOTES
Chief Complaint  Follow-up (6 mo ), Hypertension, Hyperlipidemia, and Coronary Artery Disease    Subjective            Devi Diallo presents to [facility]  She is an 85-year-old white female continues to complain of shortness of breath mostly when she exerts himself.  She admits she is unable to get any exercise due to knee problems. Denies chest pain, palpitations, swelling, dizziness, syncope, PND, and orthopnea.  Gained 6 pounds since her last visit.  Admits her diet is high in sweets and fatty foods.  Has had both Covid vaccines              Past History:    Past Medical History:   Diagnosis Date   • Arthritis    • Atherosclerotic heart disease of native coronary artery without angina pectoris 1/1/2002    cardiac catheterization 2002   • Diabetes (CMS/[facility])    • Essential hypertension 9/20/2012   • Hyperlipemia    • Hypertension    • Pure hypercholesterolemia 4/25/2014   • Reflux esophagitis    • Seasonal allergies    • Stage 2 chronic kidney disease 9/20/2012        Family History: family history includes Arthritis in her brother and mother; Cancer in her mother; Diabetes in her brother.     Social History: reports that she has never smoked. She has never used smokeless tobacco. She reports that she does not drink alcohol and does not use drugs.    Allergies: Augmentin [amoxicillin-pot clavulanate], Bactrim [sulfamethoxazole-trimethoprim], Cipro [ciprofloxacin hcl], Penicillins, and Clindamycin/lincomycin      Past Surgical History:   Procedure Laterality Date   • COLONOSCOPY     • EYE SURGERY      Eye Implant   • HYSTERECTOMY          Prior to Admission medications    Medication Sig Start Date End Date Taking? Authorizing Provider   albuterol sulfate HFA (ProAir HFA) 108 (90 Base) MCG/ACT inhaler    Yes Provider, MD Meli   amLODIPine (NORVASC) 5 MG tablet Take 5 mg by mouth Daily. 9/17/21  Yes Provider, Historical, MD   Aspirin 81 MG capsule Daily.   Yes Provider,  MD Diana Garrison Ellipta 200-25 MCG/INH inhaler  8/19/21  Yes Mlei Devlin MD   carvedilol (Coreg) 12.5 MG tablet Take 12.5 mg by mouth. 1 1/2 tab qam, 1 1/2 tab qhs   Yes Meli Devlin MD   cetirizine (zyrTEC) 10 MG tablet Take 10 mg by mouth Daily.   Yes Meli Devlin MD   furosemide (LASIX) 20 MG tablet Take 20 mg by mouth Every 12 (Twelve) Hours. 5/20/21  Yes Meli Devlin MD   glimepiride (AMARYL) 2 MG tablet TAKE ONE TABLET BY MOUTH TWICE A DAY   Yes Meli Devlin MD   losartan (COZAAR) 50 MG tablet TAKE ONE TABLET BY MOUTH TWICE A DAY 9/20/21  Yes Mario Eaton MD   losartan (COZAAR) 50 MG tablet Take 50 mg by mouth Every 12 (Twelve) Hours.   Yes Meli Devlin MD   metFORMIN (GLUCOPHAGE) 850 MG tablet Every 12 (Twelve) Hours.   Yes Meli Devlin MD   montelukast (SINGULAIR) 10 MG tablet  9/16/21  Yes Meli Devlin MD   multivitamin with minerals (MULTIVITAL PO) Take 1 tablet by mouth Daily.   Yes Meli Devlin MD   oxybutynin (DITROPAN) 5 MG tablet Take 5 mg by mouth Daily.   Yes Meli Devlin MD   PARoxetine (PAXIL) 40 MG tablet TAKE ONE TABLET BY MOUTH DAILY 7/27/21  Yes Jacobo Redmond MD   PARoxetine (PAXIL) 40 MG tablet TAKE ONE TABLET BY MOUTH DAILY   Yes Meli Devlin MD   potassium chloride (MICRO-K) 10 MEQ CR capsule Take 10 mEq by mouth Daily. 9/18/21  Yes Meli Devlin MD   pravastatin (PRAVACHOL) 80 MG tablet TAKE ONE TABLET BY MOUTH DAILY 8/17/21  Yes Jacobo Redmond MD   pravastatin (PRAVACHOL) 80 MG tablet Take 80 mg by mouth Daily.   Yes Meli Devlin MD   SITagliptin (Januvia) 50 MG tablet Daily.   Yes Meli Devlin MD   VITAMIN D PO Take  by mouth.   Yes Meli Devlin MD        Review of Systems   Respiratory: Positive for cough (Chronic) and shortness of breath (When walks).    All other systems reviewed and are negative.       Objective  "    Physical Exam  Constitutional:       General: She is not in acute distress.     Appearance: She is obese.      Comments: Ambulates with a rolling walker.  Very hard of hearing.  Accompanied by her daughter   Neck:      Vascular: No carotid bruit.   Cardiovascular:      Rate and Rhythm: Normal rate and regular rhythm.      Heart sounds: Normal heart sounds.   Pulmonary:      Effort: Pulmonary effort is normal.      Breath sounds: Normal breath sounds.   Musculoskeletal:      Right lower leg: No edema.      Left lower leg: No edema.   Neurological:      Mental Status: She is alert.       BP (!) 122/38   Pulse 51   Ht 152.4 cm (60\")   Wt 103 kg (226 lb)   BMI 44.14 kg/m²       Vitals:    09/22/21 1145   BP: (!) 122/38   Pulse: 51       Result Review :         The following data was reviewed by: WALTER Bradley on 09/22/2021:         Lab Results   Component Value Date    PROBNP 72.2 06/14/2021    BNP 52 04/16/2019     CMP    CMP 5/18/21 6/14/21 9/21/21   Glucose  143 (A) 140 (A)   Glucose 140 (A)     BUN 16 23 32 (A)   Creatinine 0.78 1.08 (A) 1.32 (A)   eGFR Non  Am  48 (A) 38 (A)   Sodium 133 (A) 137 137   Potassium 4.7 4.7 5.0   Chloride 93 (A) 95 (A) 95 (A)   Calcium 9.4 9.5 10.0   Albumin 4.0 4.10 4.10   Total Bilirubin 0.36 0.3 0.3   Alkaline Phosphatase 78 73 68   AST (SGOT) 12 (A) 9 12   ALT (SGPT) 15 9 14   (A) Abnormal value            CBC w/diff    CBC w/Diff 11/16/20 5/18/21 6/14/21   WBC 11.94 (A) 12.08 (A) 12.87 (A)   RBC 4.32 4.29 4.38   Hemoglobin 11.4 (A) 11.3 (A) 11.8 (A)   Hematocrit 37.0 36.4 (A) 37.5   MCV 85.6 84.8 85.6   MCH 26.4 (A) 26.3 (A) 26.9   MCHC 30.8 (A) 31.0 (A) 31.5   RDW 15.3 (A) 16.6 (A) 14.9   Platelets 339 327 360   Neutrophil Rel % 76.9 78.0 79.3 (A)   Immature Granulocyte Rel %   0.4   Lymphocyte Rel % 13.1 (A) 10.4 (A) 10.3 (A)   Monocyte Rel % 7.5 7.8 6.8   Eosinophil Rel % 1.8 2.7 2.9   Basophil Rel % 0.3 0.5 0.3   (A) Abnormal value             Lipid " Panel    Lipid Panel 5/18/21 9/21/21   Total Cholesterol  147   Total Cholesterol 132    Triglycerides 105 128   HDL Cholesterol 51 46   VLDL Cholesterol 21 23   LDL Cholesterol  60 (A) 78   LDL/HDL Ratio  1.64   (A) Abnormal value       Comments are available for some flowsheets but are not being displayed.            Lab Results   Component Value Date    TSH 2.590 11/16/2020    TSH 2.860 11/05/2019    TSH 3.570 08/15/2019      Lab Results   Component Value Date    FREET4 1.2 08/15/2019      No results found for: DDIMERQUANT  No results found for: MG   No results found for: DIGOXIN   A1C Last 3 Results    HGBA1C Last 3 Results 11/16/20 5/18/21   Hemoglobin A1C 6.6 (A) 6.7 (A)   (A) Abnormal value       Comments are available for some flowsheets but are not being displayed.                                Assessment and Plan        Diagnoses and all orders for this visit:    1. Atherosclerosis of native coronary artery of native heart without angina pectoris (Primary)  Assessment & Plan:  Without angina.  Continue aspirin 81 mg once a day.      2. Essential hypertension  Assessment & Plan:  Controlled.  Continue losartan 50 mg a day, Lasix 20 mg twice daily, amlodipine 5 mg and carvedilol 12.5 mg 1 1/2 tabs twice daily    Orders:  -     Comprehensive Metabolic Panel; Future  -     Magnesium; Future    3. Pure hypercholesterolemia  Assessment & Plan:  LDL slightly worse.  She has had myalgias with atorvastatin in the past.  It is not unreasonable for her to stay on the current medicine for now and if worsens then try a different statin.  Counseled regarding low-fat cholesterol diet and weight loss to help with her LDL.  Repeat labs before next appointment    Orders:  -     pravastatin (PRAVACHOL) 80 MG tablet; Take 1 tablet by mouth Daily.  Dispense: 90 tablet; Refill: 1  -     Lipid Panel; Future  -     Comprehensive Metabolic Panel; Future    4. Stage 2 chronic kidney disease  Assessment & Plan:  Slightly worse.   Counseled regarding avoiding NSAIDs.  We'll continue to monitor      5. Anxiety  -     PARoxetine (PAXIL) 40 MG tablet; Take 1 tablet by mouth Daily.  Dispense: 30 tablet; Refill: 5 (this is ordered because accidentally discontinued.  It will be reordered by PCP in the future)            Follow Up     Return in about 6 months (around 3/22/2022) for with Dr. Eaton, EKG on next visit.    Patient was given instructions and counseling regarding her condition or for health maintenance advice. Please see specific information pulled into the AVS if appropriate.       WALTER Bravo  09/22/21 11:55 EDT

## 2021-09-22 NOTE — TELEPHONE ENCOUNTER
PATIENT CALLED. SHE CANNOT GET IN TO DR. BARROW'S OFFICE UNTIL NOV.  SHE IS ASKING IF DR. DAVID WOULD MIND TO REFILL HER BREO FOR HER UNTIL SHE CAN GET INTO THE PULMONARY CLINIC.      YOANA HOUSTON    PT # [phone]

## 2021-09-22 NOTE — ASSESSMENT & PLAN NOTE
LDL slightly worse.  She has had myalgias with atorvastatin in the past.  It is not unreasonable for her to stay on the current medicine for now and if worsens then try a different statin.  Counseled regarding low-fat cholesterol diet and weight loss to help with her LDL.  Repeat labs before next appointment

## 2021-09-27 NOTE — TELEPHONE ENCOUNTER
Edelmira Parnell called ans stated the pt stated she is usually on Breo 200; however Breo 100 was sent in.

## 2021-10-01 RX ORDER — GLIMEPIRIDE 2 MG/1
TABLET ORAL
Qty: 180 TABLET | Refills: 1 | Status: SHIPPED | OUTPATIENT
Start: 2021-10-01

## 2021-10-01 RX ORDER — SITAGLIPTIN 50 MG/1
TABLET, FILM COATED ORAL
Qty: 90 TABLET | Refills: 1 | Status: SHIPPED | OUTPATIENT
Start: 2021-10-01 | End: 2022-03-01

## 2021-10-01 RX ORDER — LOSARTAN POTASSIUM 50 MG/1
TABLET ORAL
Qty: 270 TABLET | Refills: 3 | Status: SHIPPED | OUTPATIENT
Start: 2021-10-01 | End: 2022-10-19 | Stop reason: SDUPTHER

## 2021-10-08 ENCOUNTER — FLU SHOT (OUTPATIENT)
Dept: INTERNAL MEDICINE | Facility: CLINIC | Age: 86
End: 2021-10-08

## 2021-10-08 DIAGNOSIS — Z23 NEED FOR VACCINATION: ICD-10-CM

## 2021-10-08 PROCEDURE — 90662 IIV NO PRSV INCREASED AG IM: CPT | Performed by: INTERNAL MEDICINE

## 2021-10-08 PROCEDURE — G0008 ADMIN INFLUENZA VIRUS VAC: HCPCS | Performed by: INTERNAL MEDICINE

## 2021-10-24 NOTE — PROGRESS NOTES
"Chief Complaint/ HPI: unsteady on feet , no falls     Follow-up (4 month) and Diabetes    Needs refills  Objective   Vital Signs  Vitals:    10/27/21 1343   BP: 155/67   BP Location: Right arm   Patient Position: Sitting   Pulse: 84   Temp: 97 °F (36.1 °C)   SpO2: 91%   Weight: 104 kg (229 lb)   Height: 152.4 cm (60\")      Body mass index is 44.72 kg/m².  Review of Systems   Constitutional: Negative.    HENT: Negative.    Eyes: Negative.    Respiratory: Negative.    Cardiovascular: Negative.    Gastrointestinal: Negative.    Endocrine: Negative.    Genitourinary: Negative.    Musculoskeletal: Negative.    Allergic/Immunologic: Negative.    Neurological: Negative.    Hematological: Negative.    Psychiatric/Behavioral: Negative.       Physical Exam  Constitutional:       General: She is not in acute distress.     Appearance: Normal appearance.   HENT:      Head: Normocephalic.      Mouth/Throat:      Mouth: Mucous membranes are moist.   Eyes:      Conjunctiva/sclera: Conjunctivae normal.      Pupils: Pupils are equal, round, and reactive to light.   Cardiovascular:      Rate and Rhythm: Normal rate and regular rhythm.      Pulses: Normal pulses.      Heart sounds: Murmur (2/6  sys ) heard.       Pulmonary:      Effort: Pulmonary effort is normal.      Breath sounds: Normal breath sounds.   Abdominal:      General: Abdomen is flat. Bowel sounds are normal. There is distension (obese , soft nt ).      Palpations: Abdomen is soft.   Musculoskeletal:         General: No swelling. Normal range of motion.      Cervical back: Neck supple.   Skin:     General: Skin is warm and dry.      Coloration: Skin is not jaundiced.   Neurological:      General: No focal deficit present.      Mental Status: She is alert and oriented to person, place, and time. Mental status is at baseline.   Psychiatric:         Mood and Affect: Mood normal.         Behavior: Behavior normal.         Thought Content: Thought content normal.         " Judgment: Judgment normal.     obese, soft nt    Result Review :   Lab Results   Component Value Date    PROBNP 77.4 10/26/2021    PROBNP 72.2 06/14/2021    BNP 52 04/16/2019     CMP    CMP 6/14/21 9/21/21 10/26/21   Glucose 143 (A) 140 (A) 182 (A)   BUN 23 32 (A) 22   Creatinine 1.08 (A) 1.32 (A) 1.12 (A)   eGFR Non  Am 48 (A) 38 (A) 46 (A)   Sodium 137 137 136   Potassium 4.7 5.0 5.3 (A)   Chloride 95 (A) 95 (A) 96 (A)   Calcium 9.5 10.0 9.3   Albumin 4.10 4.10 4.10   Total Bilirubin 0.3 0.3 0.3   Alkaline Phosphatase 73 68 69   AST (SGOT) 9 12 12   ALT (SGPT) 9 14 16   (A) Abnormal value            CBC w/diff    CBC w/Diff 11/16/20 5/18/21 6/14/21   WBC 11.94 (A) 12.08 (A) 12.87 (A)   RBC 4.32 4.29 4.38   Hemoglobin 11.4 (A) 11.3 (A) 11.8 (A)   Hematocrit 37.0 36.4 (A) 37.5   MCV 85.6 84.8 85.6   MCH 26.4 (A) 26.3 (A) 26.9   MCHC 30.8 (A) 31.0 (A) 31.5   RDW 15.3 (A) 16.6 (A) 14.9   Platelets 339 327 360   Neutrophil Rel % 76.9 78.0 79.3 (A)   Immature Granulocyte Rel %   0.4   Lymphocyte Rel % 13.1 (A) 10.4 (A) 10.3 (A)   Monocyte Rel % 7.5 7.8 6.8   Eosinophil Rel % 1.8 2.7 2.9   Basophil Rel % 0.3 0.5 0.3   (A) Abnormal value             Lipid Panel    Lipid Panel 5/18/21 9/21/21   Total Cholesterol  147   Total Cholesterol 132    Triglycerides 105 128   HDL Cholesterol 51 46   VLDL Cholesterol 21 23   LDL Cholesterol  60 (A) 78   LDL/HDL Ratio  1.64   (A) Abnormal value       Comments are available for some flowsheets but are not being displayed.            Lab Results   Component Value Date    TSH 2.590 11/16/2020    TSH 2.860 11/05/2019    TSH 3.570 08/15/2019      Lab Results   Component Value Date    FREET4 1.2 08/15/2019      A1C Last 3 Results    HGBA1C Last 3 Results 11/16/20 5/18/21 10/26/21   Hemoglobin A1C 6.6 (A) 6.7 (A) 6.94 (A)   (A) Abnormal value       Comments are available for some flowsheets but are not being displayed.                               ICD-10-CM ICD-9-CM   1. Stage 2  chronic kidney disease  N18.2 585.2   2. Atherosclerosis of native coronary artery of native heart without angina pectoris  I25.10 414.01   3. Essential hypertension  I10 401.9   4. Pure hypercholesterolemia  E78.00 272.0   5. Class 3 severe obesity due to excess calories with serious comorbidity and body mass index (BMI) of 40.0 to 44.9 in adult ([facility])  E66.01 278.01    Z68.41 V85.41   6. Iron deficiency anemia, unspecified iron deficiency anemia type  D50.9 280.9       Assessment and Plan    Diagnoses and all orders for this visit:    1. Stage 2 chronic kidney disease (Primary)    2. Atherosclerosis of native coronary artery of native heart without angina pectoris    3. Essential hypertension    4. Pure hypercholesterolemia    5. Class 3 severe obesity due to excess calories with serious comorbidity and body mass index (BMI) of 40.0 to 44.9 in adult ([facility])    6. Iron deficiency anemia, unspecified iron deficiency anemia type      lower extremity edema --BR NP level low at 77, clinically stable --- continue Lasix 20 mg twice daily, potassium 10 mEq twice daily    refractory upper respiratory infection cough wheezing asthma exacerbations been in the office several times has been on a tapering dose of steroids February 2019,- through November 2020,------will refer to pulmonology get a CT scan of the chest, continue breathing treatments for right now,--april 2019, Chest x-ray shows questionable infiltrate versus fullness in the right hilum right lung field,--- CBC shows a white count 14,000, eosinophil percent is very mildly elevated, SEEING DR BANKS --- CARLY CHIU, NOV 2019, ---PULM NOD. ON CT APRIL 2019, REPEATED CT ABD/PELVIS AND CHEST ---JUNE 2019,    AND THEN MRI OF ABD FOR RENAL MASS----- JULY, 2019,     ELEVATED WBC CHRONIC , STABLE - NOV 2020 --DID SEE DR BUCIO, NO RX stable may 2021, stable again October 2021    ALLERGRIES , CONT ZYRTEC AND SINGULAIR    HTN -- cont NORVASC 5 mg daily , Coreg 18.375 twice  a day, losartan 50 bid,    IRON DEF ANEMIA,     DM , continue AMARYL 1 mg qm , 2 mg qhs  , Januvia 50 mg daily, metformin 850 mg twice a day,----hemoglobin A1c up slightly at 6.9, October 2021     OBESITY, discussed October 2021    DEPRESSION-   continue on Paxil 40 mg daily    ELEVATED CHOL-continues PRAVASTATIN 80 MG QHS     PARESH,-RESOLVED-patient in hospital-- 3/2014 with acute renal failure, anuric, required dialysis for several days, resolved     Urge inCONTINENCE-continues OXYBUTYNIN DISCUSSED--USING ONE DAILY, estrogen cream daily or twice weekly     NEUROPATHY, RX OPTIONS DISCUSSED --B VITAMINS REC     EYE CHECKED --DOES JUAN LUIS, JAN 2021     DOES JUAN LUIS NICHOLAS IN [place] --OCT 2017, HAD DIAGNBrandon NICHOLAS AND DID BX , BENIGN--LMAMMO NOV 2019, [place] ,--OK TO STOP MAY 2020,    Follow Up   Return in about 6 months (around 4/27/2022).  Patient was given instructions and counseling regarding her condition or for health maintenance advice. Please see specific information pulled into the AVS if appropriate.

## 2021-10-26 ENCOUNTER — LAB (OUTPATIENT)
Dept: LAB | Facility: HOSPITAL | Age: 86
End: 2021-10-26

## 2021-10-26 ENCOUNTER — TRANSCRIBE ORDERS (OUTPATIENT)
Dept: LAB | Facility: HOSPITAL | Age: 86
End: 2021-10-26

## 2021-10-26 DIAGNOSIS — I50.9 HEART FAILURE, UNSPECIFIED HF CHRONICITY, UNSPECIFIED HEART FAILURE TYPE (HCC): ICD-10-CM

## 2021-10-26 DIAGNOSIS — E11.9 DIABETES MELLITUS WITHOUT COMPLICATION (HCC): ICD-10-CM

## 2021-10-26 DIAGNOSIS — D64.9 ANEMIA, UNSPECIFIED TYPE: ICD-10-CM

## 2021-10-26 DIAGNOSIS — E11.9 DIABETES MELLITUS WITHOUT COMPLICATION (HCC): Primary | ICD-10-CM

## 2021-10-26 LAB
ALBUMIN SERPL-MCNC: 4.1 G/DL (ref 3.5–5.2)
ALBUMIN/GLOB SERPL: 1.3 G/DL
ALP SERPL-CCNC: 69 U/L (ref 39–117)
ALT SERPL W P-5'-P-CCNC: 16 U/L (ref 1–33)
ANION GAP SERPL CALCULATED.3IONS-SCNC: 12.8 MMOL/L (ref 5–15)
AST SERPL-CCNC: 12 U/L (ref 1–32)
BASOPHILS # BLD AUTO: 0.03 10*3/MM3 (ref 0–0.2)
BASOPHILS NFR BLD AUTO: 0.2 % (ref 0–1.5)
BILIRUB SERPL-MCNC: 0.3 MG/DL (ref 0–1.2)
BUN SERPL-MCNC: 22 MG/DL (ref 8–23)
BUN/CREAT SERPL: 19.6 (ref 7–25)
CALCIUM SPEC-SCNC: 9.3 MG/DL (ref 8.6–10.5)
CHLORIDE SERPL-SCNC: 96 MMOL/L (ref 98–107)
CO2 SERPL-SCNC: 27.2 MMOL/L (ref 22–29)
CREAT SERPL-MCNC: 1.12 MG/DL (ref 0.57–1)
DEPRECATED RDW RBC AUTO: 45.1 FL (ref 37–54)
EOSINOPHIL # BLD AUTO: 0.28 10*3/MM3 (ref 0–0.4)
EOSINOPHIL NFR BLD AUTO: 2.2 % (ref 0.3–6.2)
ERYTHROCYTE [DISTWIDTH] IN BLOOD BY AUTOMATED COUNT: 13.9 % (ref 12.3–15.4)
GFR SERPL CREATININE-BSD FRML MDRD: 46 ML/MIN/1.73
GLOBULIN UR ELPH-MCNC: 3.2 GM/DL
GLUCOSE SERPL-MCNC: 182 MG/DL (ref 65–99)
HBA1C MFR BLD: 6.94 % (ref 4.8–5.6)
HCT VFR BLD AUTO: 36 % (ref 34–46.6)
HGB BLD-MCNC: 11 G/DL (ref 12–15.9)
IMM GRANULOCYTES # BLD AUTO: 0.05 10*3/MM3 (ref 0–0.05)
IMM GRANULOCYTES NFR BLD AUTO: 0.4 % (ref 0–0.5)
LYMPHOCYTES # BLD AUTO: 1.68 10*3/MM3 (ref 0.7–3.1)
LYMPHOCYTES NFR BLD AUTO: 13.5 % (ref 19.6–45.3)
MCH RBC QN AUTO: 27.3 PG (ref 26.6–33)
MCHC RBC AUTO-ENTMCNC: 30.6 G/DL (ref 31.5–35.7)
MCV RBC AUTO: 89.3 FL (ref 79–97)
MONOCYTES # BLD AUTO: 1 10*3/MM3 (ref 0.1–0.9)
MONOCYTES NFR BLD AUTO: 8 % (ref 5–12)
NEUTROPHILS NFR BLD AUTO: 75.7 % (ref 42.7–76)
NEUTROPHILS NFR BLD AUTO: 9.45 10*3/MM3 (ref 1.7–7)
NRBC BLD AUTO-RTO: 0 /100 WBC (ref 0–0.2)
NT-PROBNP SERPL-MCNC: 77.4 PG/ML (ref 0–1800)
PLATELET # BLD AUTO: 334 10*3/MM3 (ref 140–450)
PMV BLD AUTO: 9.3 FL (ref 6–12)
POTASSIUM SERPL-SCNC: 5.3 MMOL/L (ref 3.5–5.2)
PROT SERPL-MCNC: 7.3 G/DL (ref 6–8.5)
RBC # BLD AUTO: 4.03 10*6/MM3 (ref 3.77–5.28)
SODIUM SERPL-SCNC: 136 MMOL/L (ref 136–145)
WBC # BLD AUTO: 12.49 10*3/MM3 (ref 3.4–10.8)

## 2021-10-26 PROCEDURE — 36415 COLL VENOUS BLD VENIPUNCTURE: CPT

## 2021-10-26 PROCEDURE — 83036 HEMOGLOBIN GLYCOSYLATED A1C: CPT

## 2021-10-26 PROCEDURE — 85025 COMPLETE CBC W/AUTO DIFF WBC: CPT

## 2021-10-26 PROCEDURE — 83880 ASSAY OF NATRIURETIC PEPTIDE: CPT

## 2021-10-26 PROCEDURE — 80053 COMPREHEN METABOLIC PANEL: CPT

## 2021-10-27 ENCOUNTER — OFFICE VISIT (OUTPATIENT)
Dept: INTERNAL MEDICINE | Facility: CLINIC | Age: 86
End: 2021-10-27

## 2021-10-27 VITALS
HEIGHT: 60 IN | TEMPERATURE: 97 F | OXYGEN SATURATION: 91 % | DIASTOLIC BLOOD PRESSURE: 67 MMHG | WEIGHT: 229 LBS | HEART RATE: 84 BPM | BODY MASS INDEX: 44.96 KG/M2 | SYSTOLIC BLOOD PRESSURE: 155 MMHG

## 2021-10-27 DIAGNOSIS — I10 ESSENTIAL HYPERTENSION: ICD-10-CM

## 2021-10-27 DIAGNOSIS — E78.00 PURE HYPERCHOLESTEROLEMIA: ICD-10-CM

## 2021-10-27 DIAGNOSIS — I25.10 ATHEROSCLEROSIS OF NATIVE CORONARY ARTERY OF NATIVE HEART WITHOUT ANGINA PECTORIS: ICD-10-CM

## 2021-10-27 DIAGNOSIS — D50.9 IRON DEFICIENCY ANEMIA, UNSPECIFIED IRON DEFICIENCY ANEMIA TYPE: ICD-10-CM

## 2021-10-27 DIAGNOSIS — E66.01 CLASS 3 SEVERE OBESITY DUE TO EXCESS CALORIES WITH SERIOUS COMORBIDITY AND BODY MASS INDEX (BMI) OF 40.0 TO 44.9 IN ADULT (HCC): ICD-10-CM

## 2021-10-27 DIAGNOSIS — E13.9 DIABETES 1.5, MANAGED AS TYPE 2 (HCC): ICD-10-CM

## 2021-10-27 DIAGNOSIS — N18.2 STAGE 2 CHRONIC KIDNEY DISEASE: Primary | ICD-10-CM

## 2021-10-27 PROBLEM — E66.813 CLASS 3 SEVERE OBESITY DUE TO EXCESS CALORIES WITH SERIOUS COMORBIDITY AND BODY MASS INDEX (BMI) OF 40.0 TO 44.9 IN ADULT: Status: ACTIVE | Noted: 2021-10-27

## 2021-10-27 PROCEDURE — 99214 OFFICE O/P EST MOD 30 MIN: CPT | Performed by: INTERNAL MEDICINE

## 2021-10-27 RX ORDER — OFLOXACIN 3 MG/ML
SOLUTION AURICULAR (OTIC)
COMMUNITY
Start: 2021-09-28 | End: 2022-08-29

## 2021-10-27 RX ORDER — CLOBETASOL PROPIONATE 0.5 MG/G
1 CREAM TOPICAL 2 TIMES DAILY
Qty: 45 G | Refills: 3 | Status: SHIPPED | OUTPATIENT
Start: 2021-10-27 | End: 2022-12-27

## 2021-10-27 RX ORDER — ESTRADIOL 0.1 MG/G
2 CREAM VAGINAL DAILY
Qty: 42.5 G | Refills: 12 | Status: SHIPPED | OUTPATIENT
Start: 2021-10-27 | End: 2023-01-03

## 2021-11-17 ENCOUNTER — OFFICE VISIT (OUTPATIENT)
Dept: PULMONOLOGY | Facility: CLINIC | Age: 86
End: 2021-11-17

## 2021-11-17 ENCOUNTER — HOSPITAL ENCOUNTER (OUTPATIENT)
Dept: GENERAL RADIOLOGY | Facility: HOSPITAL | Age: 86
Discharge: HOME | End: 2021-11-17
Admitting: INTERNAL MEDICINE

## 2021-11-17 VITALS
HEIGHT: 60 IN | OXYGEN SATURATION: 91 % | TEMPERATURE: 98.7 F | RESPIRATION RATE: 22 BRPM | WEIGHT: 223 LBS | BODY MASS INDEX: 43.78 KG/M2

## 2021-11-17 DIAGNOSIS — E66.01 CLASS 3 SEVERE OBESITY DUE TO EXCESS CALORIES WITH SERIOUS COMORBIDITY AND BODY MASS INDEX (BMI) OF 40.0 TO 44.9 IN ADULT (HCC): ICD-10-CM

## 2021-11-17 DIAGNOSIS — I25.10 CORONARY ARTERY DISEASE INVOLVING NATIVE CORONARY ARTERY OF NATIVE HEART WITHOUT ANGINA PECTORIS: ICD-10-CM

## 2021-11-17 DIAGNOSIS — J45.40 MODERATE PERSISTENT ASTHMA WITHOUT COMPLICATION: Primary | ICD-10-CM

## 2021-11-17 DIAGNOSIS — J45.40 MODERATE PERSISTENT ASTHMA WITHOUT COMPLICATION: ICD-10-CM

## 2021-11-17 PROCEDURE — 99213 OFFICE O/P EST LOW 20 MIN: CPT | Performed by: INTERNAL MEDICINE

## 2021-11-17 PROCEDURE — 71046 X-RAY EXAM CHEST 2 VIEWS: CPT

## 2021-11-17 RX ORDER — ALBUTEROL SULFATE 90 UG/1
2 AEROSOL, METERED RESPIRATORY (INHALATION) EVERY 6 HOURS PRN
Qty: 1 G | Refills: 5 | Status: SHIPPED | OUTPATIENT
Start: 2021-11-17 | End: 2021-12-17

## 2021-11-17 NOTE — PROGRESS NOTES
Pulmonary Consultation    No ref. provider found,    Thank you for asking me to see Devi Diallo for   Chief Complaint   Patient presents with   • Establish Care     New Patient SOA   • Shortness of Breath   .      History of Present Illness  Devi Diallo is a 85 y.o. female with a PMH significant for chronic bronchitis and bronchial asthma presents for follow-up she has been doing fairly well on her Breo and albuterol as needed she does not do much of physical exertion and is fairly satisfied with how she is doing she denies any significant cough or expectoration she denies any chest pain fever or hemoptysis       Tobacco use history: Never smoker  Review of Systems: History obtained from chart review and the patient.  Review of Systems   Respiratory: Positive for shortness of breath.    All other systems reviewed and are negative.    As described in the HPI. Otherwise, remainder of ROS (14 systems) were negative.    Patient Active Problem List   Diagnosis   • Atherosclerotic heart disease of native coronary artery without angina pectoris   • Essential hypertension   • Pure hypercholesterolemia   • Stage 2 chronic kidney disease   • Iron deficiency anemia   • Class 3 severe obesity due to excess calories with serious comorbidity and body mass index (BMI) of 40.0 to 44.9 in adult (HCC)   • Diabetes 1.5, managed as type 2 ([facility])         Current Outpatient Medications:   •  albuterol sulfate HFA (ProAir HFA) 108 (90 Base) MCG/ACT inhaler, Inhale 2 puffs Every 6 (Six) Hours As Needed for Wheezing for up to 30 days., Disp: 1 g, Rfl: 5  •  amLODIPine (NORVASC) 5 MG tablet, Take 5 mg by mouth Daily., Disp: , Rfl:   •  Aspirin 81 MG capsule, Daily., Disp: , Rfl:   •  Breo Ellipta 200-25 MCG/INH inhaler, Inhale 1 puff Daily., Disp: 1 each, Rfl: 3  •  carvedilol (Coreg) 12.5 MG tablet, Take 12.5 mg by mouth. 1 1/2 tab qam, 1 1/2 tab qhs, Disp: , Rfl:   •  cetirizine (zyrTEC) 10 MG tablet, Take 10 mg by mouth Daily.,  Disp: , Rfl:   •  clobetasol (TEMOVATE) 0.05 % cream, Apply 1 application topically to the appropriate area as directed 2 (Two) Times a Day., Disp: 45 g, Rfl: 3  •  estradiol (ESTRACE) 0.1 MG/GM vaginal cream, Insert 2 g into the vagina Daily., Disp: 42.5 g, Rfl: 12  •  furosemide (LASIX) 20 MG tablet, Take 20 mg by mouth Every 12 (Twelve) Hours., Disp: , Rfl:   •  glimepiride (AMARYL) 2 MG tablet, TAKE ONE TABLET BY MOUTH TWICE A DAY, Disp: 180 tablet, Rfl: 1  •  Januvia 50 MG tablet, TAKE ONE TABLET BY MOUTH DAILY, Disp: 90 tablet, Rfl: 1  •  losartan (COZAAR) 50 MG tablet, TAKE ONE TABLET BY MOUTH TWICE A DAY, Disp: 270 tablet, Rfl: 3  •  metFORMIN (GLUCOPHAGE) 850 MG tablet, Every 12 (Twelve) Hours., Disp: , Rfl:   •  montelukast (SINGULAIR) 10 MG tablet, , Disp: , Rfl:   •  multivitamin with minerals (MULTIVITAL PO), Take 1 tablet by mouth Daily., Disp: , Rfl:   •  ofloxacin (FLOXIN) 0.3 % otic solution, , Disp: , Rfl:   •  oxybutynin (DITROPAN) 5 MG tablet, Take 5 mg by mouth Daily., Disp: , Rfl:   •  PARoxetine (PAXIL) 40 MG tablet, Take 1 tablet by mouth Daily., Disp: 30 tablet, Rfl: 5  •  potassium chloride (MICRO-K) 10 MEQ CR capsule, Take 10 mEq by mouth Daily., Disp: , Rfl:   •  pravastatin (PRAVACHOL) 80 MG tablet, Take 1 tablet by mouth Daily., Disp: 90 tablet, Rfl: 1  •  VITAMIN D PO, Take  by mouth., Disp: , Rfl:     Allergies   Allergen Reactions   • Amoxicillin-Pot Clavulanate Hives and Unknown - Low Severity   • Cipro [Ciprofloxacin Hcl] Hives   • Ciprofloxacin Unknown - Low Severity   • Clindamycin Unknown - Low Severity   • Penicillins Hives   • Sulfamethoxazole-Trimethoprim Nausea Only and Unknown - Low Severity   • Clindamycin/Lincomycin Rash       Past Medical History:   Diagnosis Date   • Arthritis    • Atherosclerotic heart disease of native coronary artery without angina pectoris 1/1/2002    cardiac catheterization 2002   • Diabetes (HCC)    • Essential hypertension 9/20/2012   •  "Hyperlipemia    • Hypertension    • Pure hypercholesterolemia 4/25/2014   • Reflux esophagitis    • Seasonal allergies    • Stage 2 chronic kidney disease 9/20/2012     Past Surgical History:   Procedure Laterality Date   • COLONOSCOPY     • EYE SURGERY      Eye Implant   • HYSTERECTOMY       Social History     Socioeconomic History   • Marital status:    Tobacco Use   • Smoking status: Never Smoker   • Smokeless tobacco: Never Used   Vaping Use   • Vaping Use: Never used   Substance and Sexual Activity   • Alcohol use: Never   • Drug use: Never   • Sexual activity: Defer     Family History   Problem Relation Age of Onset   • Cancer Mother         Unspecified   • Arthritis Mother    • Diabetes Brother         Unspecified type   • Arthritis Brother           Objective     Temperature 98.7 °F (37.1 °C), temperature source Tympanic, resp. rate 22, height 152.4 cm (60\"), weight 101 kg (223 lb), SpO2 91 %.  Physical Exam  Constitutional:       Appearance: She is obese.   HENT:      Head: Normocephalic and atraumatic.      Nose: Nose normal.      Mouth/Throat:      Mouth: Mucous membranes are moist.   Eyes:      Extraocular Movements: Extraocular movements intact.      Pupils: Pupils are equal, round, and reactive to light.   Cardiovascular:      Rate and Rhythm: Normal rate and regular rhythm.      Pulses: Normal pulses.      Heart sounds: Normal heart sounds.   Pulmonary:      Effort: Pulmonary effort is normal.      Breath sounds: Normal breath sounds.   Abdominal:      General: Abdomen is flat.      Palpations: Abdomen is soft.   Musculoskeletal:         General: Normal range of motion.      Cervical back: Normal range of motion and neck supple.   Skin:     General: Skin is warm.      Capillary Refill: Capillary refill takes less than 2 seconds.   Neurological:      General: No focal deficit present.      Mental Status: She is alert and oriented to person, place, and time.   Psychiatric:         Mood and " Affect: Mood normal.                Assessment/Plan     Diagnoses and all orders for this visit:    1. Moderate persistent asthma without complication (Primary)  -     XR Chest 2 View; Future  -     Full Pulmonary Function Test With Bronchodilator; Future    2. Class 3 severe obesity due to excess calories with serious comorbidity and body mass index (BMI) of 40.0 to 44.9 in adult ([facility])    3. Coronary artery disease involving native coronary artery of native heart without angina pectoris    Other orders  -     albuterol sulfate HFA (ProAir HFA) 108 (90 Base) MCG/ACT inhaler; Inhale 2 puffs Every 6 (Six) Hours As Needed for Wheezing for up to 30 days.  Dispense: 1 g; Refill: 5  -     Breo Ellipta 200-25 MCG/INH inhaler; Inhale 1 puff Daily.  Dispense: 1 each; Refill: 3         Discussion/ Recommendations:   Patient is doing fairly well on her current regimen she denies any tobacco abuse and denies any significant expectoration or cough at this time we will therefore continue her medications we will order chest x-ray and PFTs for evaluation and follow-up    Patient's Body mass index is 43.55 kg/m². indicating that she is morbidly obese (BMI > 40 or > 35 with obesity - related health condition). Obesity-related health conditions include the following: hypertension. Obesity is unchanged. BMI is is above average; BMI management plan is completed. We discussed low calorie, low carb based diet program, portion control and increasing exercise..           Return in about 3 months (around 2/17/2022).      Thank you for allowing me to participate in the care of Devi Diallo. Please do not hesitate to contact me with any questions.         This document has been electronically signed by Westley Snyder MD on November 17, 2021 08:46 EST

## 2021-11-19 ENCOUNTER — TELEPHONE (OUTPATIENT)
Dept: INTERNAL MEDICINE | Facility: CLINIC | Age: 86
End: 2021-11-19

## 2021-11-19 PROCEDURE — 87186 SC STD MICRODIL/AGAR DIL: CPT | Performed by: NURSE PRACTITIONER

## 2021-11-19 PROCEDURE — 87086 URINE CULTURE/COLONY COUNT: CPT | Performed by: NURSE PRACTITIONER

## 2021-11-19 PROCEDURE — 87077 CULTURE AEROBIC IDENTIFY: CPT | Performed by: NURSE PRACTITIONER

## 2021-11-19 NOTE — TELEPHONE ENCOUNTER
Patient called stating that she feels like she has a bladder infection and that she thought that there was traces of blood. I advised patient to go to urgent care and to give us a call back if she needed anything further.

## 2021-11-22 ENCOUNTER — APPOINTMENT (OUTPATIENT)
Dept: GENERAL RADIOLOGY | Facility: HOSPITAL | Age: 86
End: 2021-11-22

## 2021-12-13 RX ORDER — FUROSEMIDE 20 MG/1
TABLET ORAL
Qty: 60 TABLET | Refills: 5 | Status: SHIPPED | OUTPATIENT
Start: 2021-12-13 | End: 2022-09-12

## 2021-12-20 ENCOUNTER — TELEPHONE (OUTPATIENT)
Dept: INTERNAL MEDICINE | Facility: CLINIC | Age: 86
End: 2021-12-20

## 2021-12-20 RX ORDER — FLUCONAZOLE 100 MG/1
100 TABLET ORAL ONCE AS NEEDED
Qty: 3 TABLET | Refills: 1 | Status: SHIPPED | OUTPATIENT
Start: 2021-12-20 | End: 2022-03-09

## 2021-12-20 RX ORDER — POTASSIUM CHLORIDE 750 MG/1
CAPSULE, EXTENDED RELEASE ORAL
Qty: 60 CAPSULE | Refills: 5 | Status: SHIPPED | OUTPATIENT
Start: 2021-12-20 | End: 2022-10-17

## 2021-12-20 RX ORDER — AZITHROMYCIN 250 MG/1
TABLET, FILM COATED ORAL
Qty: 6 TABLET | Refills: 0 | Status: SHIPPED | OUTPATIENT
Start: 2021-12-20 | End: 2021-12-25

## 2021-12-20 RX ORDER — PREDNISONE 50 MG/1
50 TABLET ORAL DAILY
Qty: 5 TABLET | Refills: 0 | Status: SHIPPED | OUTPATIENT
Start: 2021-12-20 | End: 2022-03-09

## 2021-12-20 NOTE — TELEPHONE ENCOUNTER
PATIENT CALLED. SHE HAS HER USUAL CHRONIC BRONCHITIS ISSUES WITH COUGHING AND GREEN SPUTUM FOR ABOUT 5-6 DAYS NOW.  CHECKED HER PULSE OX TODAY IS 91%. SAYS SHE HAS NOTHING ELSE GOING ON AND HER BLOOD PRESSURE IS FINE, BUT SHE USUALLY WILL GET A Z-PAC AND DIFLUCAN FOR THE YEAST AND IS ASKING IF DR. DAVID WOULD SEND THAT IN FOR HER.      USES [facility]ST[facility]    PT # [phone]

## 2021-12-27 ENCOUNTER — TELEPHONE (OUTPATIENT)
Dept: INTERNAL MEDICINE | Facility: CLINIC | Age: 86
End: 2021-12-27

## 2021-12-27 ENCOUNTER — TELEMEDICINE (OUTPATIENT)
Dept: INTERNAL MEDICINE | Facility: CLINIC | Age: 86
End: 2021-12-27

## 2021-12-27 DIAGNOSIS — N30.00 ACUTE CYSTITIS WITHOUT HEMATURIA: Primary | ICD-10-CM

## 2021-12-27 DIAGNOSIS — B37.31 VAGINAL YEAST INFECTION: ICD-10-CM

## 2021-12-27 DIAGNOSIS — R11.0 NAUSEA: ICD-10-CM

## 2021-12-27 PROCEDURE — 99213 OFFICE O/P EST LOW 20 MIN: CPT | Performed by: INTERNAL MEDICINE

## 2021-12-27 RX ORDER — FLUCONAZOLE 150 MG/1
1 TABLET ORAL DAILY
COMMUNITY
Start: 2021-11-19 | End: 2022-03-09

## 2021-12-27 RX ORDER — NITROFURANTOIN 25; 75 MG/1; MG/1
100 CAPSULE ORAL 2 TIMES DAILY
Qty: 14 CAPSULE | Refills: 0 | Status: SHIPPED | OUTPATIENT
Start: 2021-12-27 | End: 2022-03-09

## 2021-12-27 RX ORDER — FLUCONAZOLE 100 MG/1
100 TABLET ORAL DAILY
Qty: 3 TABLET | Refills: 1 | Status: SHIPPED | OUTPATIENT
Start: 2021-12-27 | End: 2022-03-09

## 2021-12-27 RX ORDER — PANTOPRAZOLE SODIUM 40 MG/1
1 TABLET, DELAYED RELEASE ORAL DAILY
COMMUNITY
Start: 2021-11-30 | End: 2022-02-28

## 2021-12-27 NOTE — PROGRESS NOTES
Chief Complaint/ HPI:   Patient with dysuria, possible recurrent UTI, burning with urination has taken some Pyridium, also has had associated nausea with possibly something to eat that did not set with her well last night, no fevers, no vomiting,    Patient also gets recurrent yeast infections after antibiotics so we will need to give her something for this,    Telehealth visit, patient was seen by Anthony with audio video available, patient is agreeable to the visit and it was a good connection    Objective   Vital Signs  There were no vitals filed for this visit.   There is no height or weight on file to calculate BMI.  Review of Systems nausea, no rashes no fevers no diarrhea,  Physical Exam patient seen on telehealth video alert pleasant no rashes on the face, talkative coherent,  Result Review :   Lab Results   Component Value Date    PROBNP 77.4 10/26/2021    PROBNP 72.2 06/14/2021    BNP 52 04/16/2019     CMP    CMP 6/14/21 9/21/21 10/26/21   Glucose 143 (A) 140 (A) 182 (A)   BUN 23 32 (A) 22   Creatinine 1.08 (A) 1.32 (A) 1.12 (A)   eGFR Non  Am 48 (A) 38 (A) 46 (A)   Sodium 137 137 136   Potassium 4.7 5.0 5.3 (A)   Chloride 95 (A) 95 (A) 96 (A)   Calcium 9.5 10.0 9.3   Albumin 4.10 4.10 4.10   Total Bilirubin 0.3 0.3 0.3   Alkaline Phosphatase 73 68 69   AST (SGOT) 9 12 12   ALT (SGPT) 9 14 16   (A) Abnormal value            CBC w/diff    CBC w/Diff 5/18/21 6/14/21 10/26/21   WBC 12.08 (A) 12.87 (A) 12.49 (A)   RBC 4.29 4.38 4.03   Hemoglobin 11.3 (A) 11.8 (A) 11.0 (A)   Hematocrit 36.4 (A) 37.5 36.0   MCV 84.8 85.6 89.3   MCH 26.3 (A) 26.9 27.3   MCHC 31.0 (A) 31.5 30.6 (A)   RDW 16.6 (A) 14.9 13.9   Platelets 327 360 334   Neutrophil Rel % 78.0 79.3 (A) 75.7   Immature Granulocyte Rel %  0.4 0.4   Lymphocyte Rel % 10.4 (A) 10.3 (A) 13.5 (A)   Monocyte Rel % 7.8 6.8 8.0   Eosinophil Rel % 2.7 2.9 2.2   Basophil Rel % 0.5 0.3 0.2   (A) Abnormal value             Lipid Panel    Lipid Panel 5/18/21  9/21/21   Total Cholesterol  147   Total Cholesterol 132    Triglycerides 105 128   HDL Cholesterol 51 46   VLDL Cholesterol 21 23   LDL Cholesterol  60 (A) 78   LDL/HDL Ratio  1.64   (A) Abnormal value       Comments are available for some flowsheets but are not being displayed.            Lab Results   Component Value Date    TSH 2.590 11/16/2020    TSH 2.860 11/05/2019    TSH 3.570 08/15/2019      Lab Results   Component Value Date    FREET4 1.2 08/15/2019      A1C Last 3 Results    HGBA1C Last 3 Results 5/18/21 10/26/21   Hemoglobin A1C 6.7 (A) 6.94 (A)   (A) Abnormal value       Comments are available for some flowsheets but are not being displayed.                             Visit Diagnoses:    ICD-10-CM ICD-9-CM   1. Acute cystitis without hematuria  N30.00 595.0   2. Nausea  R11.0 787.02   3. Vaginal yeast infection  B37.3 112.1       Assessment and Plan   Diagnoses and all orders for this visit:    1. Acute cystitis without hematuria (Primary)    2. Nausea    3. Vaginal yeast infection    Other orders  -     nitrofurantoin, macrocrystal-monohydrate, (Macrobid) 100 MG capsule; Take 1 capsule by mouth 2 (Two) Times a Day.  Dispense: 14 capsule; Refill: 0  -     fluconazole (Diflucan) 100 MG tablet; Take 1 tablet by mouth Daily.  Dispense: 3 tablet; Refill: 1        Urinary tract infection, will treat with Macrobid due to multiple allergies, patient will continue Pyridium that she already has at home and will drink plenty of fluids hydrate and even use cranberry juice if possible, will let us know if she symptoms do not improve    Vaginal yeast infection we will treat with Diflucan we will give her 3 pills she can use 1 after treatment for the UTI and then keep some on hand if necessary        Follow Up   No follow-ups on file.  Patient was given instructions and counseling regarding her condition or for health maintenance advice. Please see specific information pulled into the AVS if appropriate.

## 2021-12-27 NOTE — TELEPHONE ENCOUNTER
Patient called wanting to know if you can call her in something for her bladder infection?    Patient is allergic to Penicillin

## 2022-02-14 RX ORDER — CARVEDILOL 12.5 MG/1
TABLET ORAL
Qty: 270 TABLET | Refills: 2 | Status: SHIPPED | OUTPATIENT
Start: 2022-02-14 | End: 2022-11-14

## 2022-02-17 ENCOUNTER — OFFICE VISIT (OUTPATIENT)
Dept: PULMONOLOGY | Facility: CLINIC | Age: 87
End: 2022-02-17

## 2022-02-17 VITALS
WEIGHT: 224 LBS | BODY MASS INDEX: 43.98 KG/M2 | HEART RATE: 81 BPM | HEIGHT: 60 IN | RESPIRATION RATE: 18 BRPM | DIASTOLIC BLOOD PRESSURE: 44 MMHG | OXYGEN SATURATION: 86 % | SYSTOLIC BLOOD PRESSURE: 137 MMHG | TEMPERATURE: 97.8 F

## 2022-02-17 DIAGNOSIS — R05.9 COUGH: ICD-10-CM

## 2022-02-17 DIAGNOSIS — R91.1 LUNG NODULE: ICD-10-CM

## 2022-02-17 DIAGNOSIS — E66.01 CLASS 3 SEVERE OBESITY WITH BODY MASS INDEX (BMI) OF 40.0 TO 44.9 IN ADULT, UNSPECIFIED OBESITY TYPE, UNSPECIFIED WHETHER SERIOUS COMORBIDITY PRESENT: ICD-10-CM

## 2022-02-17 DIAGNOSIS — R06.09 DYSPNEA ON EXERTION: ICD-10-CM

## 2022-02-17 DIAGNOSIS — R09.02 HYPOXIA: ICD-10-CM

## 2022-02-17 DIAGNOSIS — J45.40 MODERATE PERSISTENT ASTHMA, UNSPECIFIED WHETHER COMPLICATED: Primary | ICD-10-CM

## 2022-02-17 DIAGNOSIS — I10 ESSENTIAL HYPERTENSION: Chronic | ICD-10-CM

## 2022-02-17 DIAGNOSIS — J42 CHRONIC BRONCHITIS, UNSPECIFIED CHRONIC BRONCHITIS TYPE: ICD-10-CM

## 2022-02-17 DIAGNOSIS — J30.2 SEASONAL ALLERGIES: ICD-10-CM

## 2022-02-17 PROCEDURE — 99214 OFFICE O/P EST MOD 30 MIN: CPT | Performed by: NURSE PRACTITIONER

## 2022-02-17 RX ORDER — ALBUTEROL SULFATE 90 UG/1
2 AEROSOL, METERED RESPIRATORY (INHALATION) EVERY 4 HOURS PRN
COMMUNITY

## 2022-02-17 NOTE — PROGRESS NOTES
Primary Care Provider  Jacobo Redmond MD     Referring Provider  No ref. provider found     Chief Complaint  Asthma (3mo f/u ), Shortness of Breath, and Cough    Subjective          Devi Diallo presents to [facility] PULMONARY [facility]ICAL [facility]ARE MEDICINE  History of Present Illness  Devi Diallo is a 86 y.o. female patient of Dr. Snyder with a history of chronic bronchitis and bronchial asthma.  She is here for a follow-up visit today.    Patient states she is doing well since her last visit.  She denies using any antibiotics or steroids for her lungs.  She denies any fevers, chills, or productive cough.  She continues to use Breo as prescribed.  She states that she uses her albuterol inhaler less than twice a week.  Her shortness of breath is mild to moderate in severity, worse with exertion and improved with rest.  She does complain of a dry cough and allergies.  She states that she is not very active at home.  Her oxygen saturations in office are 86%.  She is not interested in having oxygen at this time, as she states that she is normally very sedentary at home and does not feel that she needs oxygen.  She is able to perform her ADLs without difficulty.  She is up-to-date with her flu, pneumonia, and Covid vaccines.     Her history of smoking is      Tobacco Use: Low Risk    • Smoking Tobacco Use: Never Smoker   • Smokeless Tobacco Use: Never Used   .    Review of Systems   Constitutional: Negative for chills, fatigue, fever, unexpected weight gain and unexpected weight loss.   HENT: Negative for congestion (Nasal), hearing loss, mouth sores, nosebleeds, postnasal drip, sore throat and trouble swallowing.    Eyes: Negative for blurred vision and visual disturbance.   Respiratory: Positive for cough and shortness of breath. Negative for apnea and wheezing.         Negative for Hemoptysis     Cardiovascular: Negative for chest pain, palpitations and leg swelling.   Gastrointestinal:  Negative for abdominal pain, constipation, diarrhea, nausea, vomiting and GERD.        Negative for Jaundice  Negative for Bloating  Negative for Melena   Musculoskeletal: Negative for joint swelling and myalgias.        Negative for Joint pain  Negative for Joint stiffness   Skin: Negative for color change.        Negative for cyanosis   Neurological: Negative for syncope, weakness, numbness and headache.      Sleep: Negative for Excessive daytime sleepiness  Negative for morning headaches  Negative for Snoring    Family History   Problem Relation Age of Onset   • Cancer Mother         Unspecified   • Arthritis Mother    • Diabetes Brother         Unspecified type   • Arthritis Brother         Social History     Socioeconomic History   • Marital status:    Tobacco Use   • Smoking status: Never Smoker   • Smokeless tobacco: Never Used   Vaping Use   • Vaping Use: Never used   Substance and Sexual Activity   • Alcohol use: Never   • Drug use: Never   • Sexual activity: Defer        Past Medical History:   Diagnosis Date   • Arthritis    • Atherosclerotic heart disease of native coronary artery without angina pectoris 1/1/2002    cardiac catheterization 2002   • Diabetes (HCC)    • Essential hypertension 9/20/2012   • Hyperlipemia    • Hypertension    • Pure hypercholesterolemia 4/25/2014   • Reflux esophagitis    • Seasonal allergies    • Stage 2 chronic kidney disease 9/20/2012        Immunization History   Administered Date(s) Administered   • COVID-19 (MODERNA) 1st, 2nd, 3rd Dose Only 02/18/2021, 03/19/2021   • Fluzone High Dose =>65 Years ([employer] ONLY) 10/16/2020   • Fluzone High-Dose 65+yrs 10/08/2021   • Pneumococcal, Unspecified 10/20/2019         Allergies   Allergen Reactions   • Amoxicillin-Pot Clavulanate Hives and Unknown - Low Severity   • Cipro [Ciprofloxacin Hcl] Hives   • Ciprofloxacin Unknown - Low Severity   • Clindamycin Unknown - Low Severity   • Penicillins Hives   •  Sulfamethoxazole-Trimethoprim Nausea Only and Unknown - Low Severity   • Clindamycin/Lincomycin Rash          Current Outpatient Medications:   •  albuterol sulfate  (90 Base) MCG/ACT inhaler, Inhale 2 puffs Every 4 (Four) Hours As Needed for Wheezing., Disp: , Rfl:   •  amLODIPine (NORVASC) 5 MG tablet, Take 5 mg by mouth Daily., Disp: , Rfl:   •  Aspirin 81 MG capsule, Daily., Disp: , Rfl:   •  Breo Ellipta 200-25 MCG/INH inhaler, Inhale 1 puff Daily., Disp: 60 each, Rfl: 11  •  carvedilol (COREG) 12.5 MG tablet, TAKE ONE AND A HALF TABLET BY MOUTH TWO TIMES A DAY, Disp: 270 tablet, Rfl: 2  •  cetirizine (zyrTEC) 10 MG tablet, Take 10 mg by mouth Daily., Disp: , Rfl:   •  clobetasol (TEMOVATE) 0.05 % cream, Apply 1 application topically to the appropriate area as directed 2 (Two) Times a Day., Disp: 45 g, Rfl: 3  •  estradiol (ESTRACE) 0.1 MG/GM vaginal cream, Insert 2 g into the vagina Daily., Disp: 42.5 g, Rfl: 12  •  furosemide (LASIX) 20 MG tablet, TAKE ONE TABLET BY MOUTH TWICE A DAY, Disp: 60 tablet, Rfl: 5  •  glimepiride (AMARYL) 2 MG tablet, TAKE ONE TABLET BY MOUTH TWICE A DAY, Disp: 180 tablet, Rfl: 1  •  Januvia 50 MG tablet, TAKE ONE TABLET BY MOUTH DAILY, Disp: 90 tablet, Rfl: 1  •  losartan (COZAAR) 50 MG tablet, TAKE ONE TABLET BY MOUTH TWICE A DAY, Disp: 270 tablet, Rfl: 3  •  metFORMIN (GLUCOPHAGE) 850 MG tablet, Every 12 (Twelve) Hours., Disp: , Rfl:   •  montelukast (SINGULAIR) 10 MG tablet, , Disp: , Rfl:   •  multivitamin with minerals (MULTIVITAL PO), Take 1 tablet by mouth Daily., Disp: , Rfl:   •  ofloxacin (FLOXIN) 0.3 % otic solution, , Disp: , Rfl:   •  oxybutynin (DITROPAN) 5 MG tablet, Take 5 mg by mouth Daily., Disp: , Rfl:   •  pantoprazole (PROTONIX) 40 MG EC tablet, Take 1 tablet by mouth Daily., Disp: , Rfl:   •  PARoxetine (PAXIL) 40 MG tablet, Take 1 tablet by mouth Daily., Disp: 30 tablet, Rfl: 5  •  phenazopyridine (PYRIDIUM) 100 MG tablet, Take 1 tablet by mouth 3  (Three) Times a Day As Needed for Bladder Spasms., Disp: 30 tablet, Rfl: 0  •  potassium chloride (MICRO-K) 10 MEQ CR capsule, TAKE ONE CAPSULE BY MOUTH TWICE A DAY WITH FOOD, Disp: 60 capsule, Rfl: 5  •  pravastatin (PRAVACHOL) 80 MG tablet, Take 1 tablet by mouth Daily., Disp: 90 tablet, Rfl: 1  •  VITAMIN D PO, Take  by mouth., Disp: , Rfl:   •  fluconazole (Diflucan) 100 MG tablet, Take 1 tablet by mouth 1 (One) Time As Needed (Vaginal yeast infection) for up to 1 dose., Disp: 3 tablet, Rfl: 1  •  fluconazole (Diflucan) 100 MG tablet, Take 1 tablet by mouth Daily., Disp: 3 tablet, Rfl: 1  •  fluconazole (DIFLUCAN) 150 MG tablet, Take 1 tablet by mouth Daily., Disp: , Rfl:   •  nitrofurantoin, macrocrystal-monohydrate, (MACROBID) 100 MG capsule, Take 1 capsule by mouth 2 (Two) Times a Day., Disp: 14 capsule, Rfl: 0  •  nitrofurantoin, macrocrystal-monohydrate, (Macrobid) 100 MG capsule, Take 1 capsule by mouth 2 (Two) Times a Day., Disp: 14 capsule, Rfl: 0  •  predniSONE (DELTASONE) 50 MG tablet, Take 1 tablet by mouth Daily., Disp: 5 tablet, Rfl: 0     Objective   Physical Exam  Constitutional:       General: She is not in acute distress.     Appearance: Normal appearance. She is obese.   HENT:      Right Ear: Hearing normal.      Left Ear: Hearing normal.      Nose: No nasal tenderness or congestion.      Mouth/Throat:      Mouth: Mucous membranes are moist. No oral lesions.   Eyes:      Extraocular Movements: Extraocular movements intact.      Pupils: Pupils are equal, round, and reactive to light.   Neck:      Thyroid: No thyroid mass or thyromegaly.   Cardiovascular:      Rate and Rhythm: Normal rate and regular rhythm.      Pulses: Normal pulses.      Heart sounds: Normal heart sounds. No murmur heard.      Pulmonary:      Effort: Pulmonary effort is normal.      Breath sounds: Normal breath sounds. No wheezing, rhonchi or rales.   Chest:   Breasts:      Right: No axillary adenopathy.       Abdominal:       "General: Bowel sounds are normal. There is no distension.      Palpations: Abdomen is soft.      Tenderness: There is no abdominal tenderness.   Musculoskeletal:      Cervical back: Neck supple.      Right lower leg: No edema.      Left lower leg: No edema.   Lymphadenopathy:      Cervical: No cervical adenopathy.      Upper Body:      Right upper body: No axillary adenopathy.   Skin:     General: Skin is warm and dry.      Findings: No lesion or rash.   Neurological:      General: No focal deficit present.      Mental Status: She is alert and oriented to person, place, and time.      Cranial Nerves: Cranial nerves are intact.   Psychiatric:         Mood and Affect: Affect normal. Mood is not anxious or depressed.         Vital Signs:   /44 (BP Location: Left arm, Patient Position: Sitting, Cuff Size: Adult)   Pulse 81   Temp 97.8 °F (36.6 °C) (Infrared)   Resp 18   Ht 152.4 cm (60\")   Wt 102 kg (224 lb)   SpO2 (!) 86% Comment: room air  BMI 43.75 kg/m²        Result Review :     CMP    CMP 6/14/21 9/21/21 10/26/21   Glucose 143 (A) 140 (A) 182 (A)   BUN 23 32 (A) 22   Creatinine 1.08 (A) 1.32 (A) 1.12 (A)   eGFR Non  Am 48 (A) 38 (A) 46 (A)   Sodium 137 137 136   Potassium 4.7 5.0 5.3 (A)   Chloride 95 (A) 95 (A) 96 (A)   Calcium 9.5 10.0 9.3   Albumin 4.10 4.10 4.10   Total Bilirubin 0.3 0.3 0.3   Alkaline Phosphatase 73 68 69   AST (SGOT) 9 12 12   ALT (SGPT) 9 14 16   (A) Abnormal value            CBC w/diff    CBC w/Diff 5/18/21 6/14/21 10/26/21   WBC 12.08 (A) 12.87 (A) 12.49 (A)   RBC 4.29 4.38 4.03   Hemoglobin 11.3 (A) 11.8 (A) 11.0 (A)   Hematocrit 36.4 (A) 37.5 36.0   MCV 84.8 85.6 89.3   MCH 26.3 (A) 26.9 27.3   MCHC 31.0 (A) 31.5 30.6 (A)   RDW 16.6 (A) 14.9 13.9   Platelets 327 360 334   Neutrophil Rel % 78.0 79.3 (A) 75.7   Immature Granulocyte Rel %  0.4 0.4   Lymphocyte Rel % 10.4 (A) 10.3 (A) 13.5 (A)   Monocyte Rel % 7.8 6.8 8.0   Eosinophil Rel % 2.7 2.9 2.2   Basophil Rel % " 0.5 0.3 0.2   (A) Abnormal value            Data reviewed: Radiologic studies chest CT 7/15/2019, chest x-ray 11/17/2021 and Dr. Snyder last office note   Procedures        Assessment and Plan    Diagnoses and all orders for this visit:    1. Moderate persistent asthma, unspecified whether complicated (Primary)  -     Breo Ellipta 200-25 MCG/INH inhaler; Inhale 1 puff Daily.  Dispense: 60 each; Refill: 11    2. Chronic bronchitis, unspecified chronic bronchitis type (HCC)  -     Breo Ellipta 200-25 MCG/INH inhaler; Inhale 1 puff Daily.  Dispense: 60 each; Refill: 11    3. Essential hypertension    4. Cough    5. Dyspnea on exertion    6. Lung nodule    7. Seasonal allergies    8. Class 3 severe obesity with body mass index (BMI) of 40.0 to 44.9 in adult, unspecified obesity type, unspecified whether serious comorbidity present (HCC)    9. Hypoxia    10.  Continue Breo as prescribed.  Rinse mouth out after each use.  11.  Continue albuterol as needed.  12.  Continue Zyrtec and Singulair.  13.  Supplemental oxygen offered to patient.  She declines at this time.  14.  Patient is scheduled to have a pulmonary function test in March 2022.  Patient is unsure if she would want to complete this test due to limited mobility.  15.  Encouraged patient to try to stay as active as possible.  Recommended 30 minutes of daily exercise.  16.  Follow-up with me in 3 months, sooner if needed.        Follow Up   Return in about 3 months (around 5/17/2022) for Recheck with Harleen.  Patient was given instructions and counseling regarding her condition or for health maintenance advice. Please see specific information pulled into the AVS if appropriate.

## 2022-02-28 RX ORDER — PANTOPRAZOLE SODIUM 40 MG/1
TABLET, DELAYED RELEASE ORAL
Qty: 90 TABLET | Refills: 3 | Status: SHIPPED | OUTPATIENT
Start: 2022-02-28 | End: 2022-03-01

## 2022-02-28 RX ORDER — AMLODIPINE BESYLATE 5 MG/1
TABLET ORAL
Qty: 90 TABLET | Refills: 3 | Status: SHIPPED | OUTPATIENT
Start: 2022-02-28 | End: 2022-03-01

## 2022-03-01 RX ORDER — AMLODIPINE BESYLATE 5 MG/1
TABLET ORAL
Qty: 90 TABLET | Refills: 3 | Status: SHIPPED | OUTPATIENT
Start: 2022-03-01 | End: 2022-03-02

## 2022-03-01 RX ORDER — PANTOPRAZOLE SODIUM 40 MG/1
TABLET, DELAYED RELEASE ORAL
Qty: 90 TABLET | Refills: 3 | Status: SHIPPED | OUTPATIENT
Start: 2022-03-01 | End: 2022-03-02

## 2022-03-01 RX ORDER — SITAGLIPTIN 50 MG/1
TABLET, FILM COATED ORAL
Qty: 90 TABLET | Refills: 3 | Status: SHIPPED | OUTPATIENT
Start: 2022-03-01 | End: 2022-03-02

## 2022-03-02 RX ORDER — SITAGLIPTIN 50 MG/1
TABLET, FILM COATED ORAL
Qty: 90 TABLET | Refills: 3 | Status: SHIPPED | OUTPATIENT
Start: 2022-03-02 | End: 2023-04-03

## 2022-03-02 RX ORDER — PANTOPRAZOLE SODIUM 40 MG/1
TABLET, DELAYED RELEASE ORAL
Qty: 90 TABLET | Refills: 3 | Status: SHIPPED | OUTPATIENT
Start: 2022-03-02

## 2022-03-02 RX ORDER — AMLODIPINE BESYLATE 5 MG/1
TABLET ORAL
Qty: 90 TABLET | Refills: 3 | Status: SHIPPED | OUTPATIENT
Start: 2022-03-02

## 2022-03-09 ENCOUNTER — OFFICE VISIT (OUTPATIENT)
Dept: CARDIOLOGY | Facility: CLINIC | Age: 87
End: 2022-03-09

## 2022-03-09 VITALS — WEIGHT: 221 LBS | BODY MASS INDEX: 43.39 KG/M2 | HEIGHT: 60 IN

## 2022-03-09 DIAGNOSIS — I25.10 ATHEROSCLEROSIS OF NATIVE CORONARY ARTERY OF NATIVE HEART WITHOUT ANGINA PECTORIS: Primary | Chronic | ICD-10-CM

## 2022-03-09 DIAGNOSIS — I34.0 NONRHEUMATIC MITRAL VALVE REGURGITATION: ICD-10-CM

## 2022-03-09 DIAGNOSIS — E78.00 PURE HYPERCHOLESTEROLEMIA: Chronic | ICD-10-CM

## 2022-03-09 DIAGNOSIS — I10 ESSENTIAL HYPERTENSION: Chronic | ICD-10-CM

## 2022-03-09 DIAGNOSIS — N18.2 STAGE 2 CHRONIC KIDNEY DISEASE: Chronic | ICD-10-CM

## 2022-03-09 DIAGNOSIS — E66.01 CLASS 3 SEVERE OBESITY DUE TO EXCESS CALORIES WITH SERIOUS COMORBIDITY AND BODY MASS INDEX (BMI) OF 40.0 TO 44.9 IN ADULT: ICD-10-CM

## 2022-03-09 PROBLEM — J30.2 SEASONAL ALLERGIC RHINITIS: Status: ACTIVE | Noted: 2022-03-09

## 2022-03-09 PROBLEM — E11.9 DIABETES (HCC): Status: ACTIVE | Noted: 2022-03-09

## 2022-03-09 PROBLEM — I50.9 HEART FAILURE (HCC): Status: ACTIVE | Noted: 2022-03-09

## 2022-03-09 PROBLEM — M19.90 ARTHRITIS: Status: ACTIVE | Noted: 2022-03-09

## 2022-03-09 PROBLEM — D72.9 WHITE BLOOD CELL DISORDER: Status: ACTIVE | Noted: 2022-03-09

## 2022-03-09 PROCEDURE — 99214 OFFICE O/P EST MOD 30 MIN: CPT | Performed by: INTERNAL MEDICINE

## 2022-03-09 PROCEDURE — 93000 ELECTROCARDIOGRAM COMPLETE: CPT | Performed by: INTERNAL MEDICINE

## 2022-03-09 RX ORDER — MELATONIN
1000 DAILY
COMMUNITY

## 2022-03-09 NOTE — PROGRESS NOTES
Office Visit    Chief Complaint  Follow-up (6 month with EKG), Hyperlipidemia, and Hypertension    Subjective            Devi Diallo presents to [facility] CARDIOLOGY  Ms. Diallo is 86 years old female with moderate coronary disease hypertension hyperlipidemia, super morbid obesity is done reasonably well from a cardiac standpoint.  She denies chest pain palpitation dizziness syncope.  She does not walk much because of arthritis      Past Medical History:   Diagnosis Date   • Arthritis    • Atherosclerotic heart disease of native coronary artery without angina pectoris 1/1/2002    cardiac catheterization 2002   • Diabetes (HCC)    • Essential hypertension 9/20/2012   • Hyperlipemia    • Hypertension    • Pure hypercholesterolemia 4/25/2014   • Reflux esophagitis    • Seasonal allergies    • Stage 2 chronic kidney disease 9/20/2012       Allergies   Allergen Reactions   • Amoxicillin-Pot Clavulanate Hives and Unknown - Low Severity   • Cipro [Ciprofloxacin Hcl] Hives   • Ciprofloxacin Unknown - Low Severity   • Clindamycin Unknown - Low Severity   • Penicillins Hives   • Sulfamethoxazole-Trimethoprim Nausea Only and Unknown - Low Severity   • Clindamycin/Lincomycin Rash        Past Surgical History:   Procedure Laterality Date   • COLONOSCOPY     • EYE SURGERY      Eye Implant   • HYSTERECTOMY          Social History     Tobacco Use   • Smoking status: Never Smoker   • Smokeless tobacco: Never Used   Vaping Use   • Vaping Use: Never used   Substance Use Topics   • Alcohol use: Never   • Drug use: Never       Family History   Problem Relation Age of Onset   • Cancer Mother         Unspecified   • Arthritis Mother    • Diabetes Brother         Unspecified type   • Arthritis Brother         Prior to Admission medications    Medication Sig Start Date End Date Taking? Authorizing Provider   albuterol sulfate  (90 Base) MCG/ACT inhaler Inhale 2 puffs Every 4 (Four) Hours As Needed for  Wheezing.   Yes Meli Devlin MD   amLODIPine (NORVASC) 5 MG tablet TAKE ONE TABLET BY MOUTH DAILY 3/2/22  Yes Jacobo Redmond MD   Aspirin 81 MG capsule Daily.   Yes Meli Devlin MD Breo Ellipta 200-25 MCG/INH inhaler Inhale 1 puff Daily. 2/17/22  Yes Harleen Dasilva APRN   carvedilol (COREG) 12.5 MG tablet TAKE ONE AND A HALF TABLET BY MOUTH TWO TIMES A DAY 2/14/22  Yes Mario Eaton MD   cetirizine (zyrTEC) 10 MG tablet Take 10 mg by mouth Daily.   Yes Meli Devlin MD   cholecalciferol (VITAMIN D3) 25 MCG (1000 UT) tablet Take 1,000 Units by mouth Daily.   Yes Meli Devlin MD   clobetasol (TEMOVATE) 0.05 % cream Apply 1 application topically to the appropriate area as directed 2 (Two) Times a Day. 10/27/21  Yes Jacobo Redmond MD   estradiol (ESTRACE) 0.1 MG/GM vaginal cream Insert 2 g into the vagina Daily. 10/27/21  Yes Jacobo Redmond MD   furosemide (LASIX) 20 MG tablet TAKE ONE TABLET BY MOUTH TWICE A DAY 12/13/21  Yes Jacobo Redmond MD   glimepiride (AMARYL) 2 MG tablet TAKE ONE TABLET BY MOUTH TWICE A DAY 10/1/21  Yes Jacobo Redmond MD   Januvia 50 MG tablet TAKE ONE TABLET BY MOUTH DAILY 3/2/22  Yes Jacobo Redmond MD   losartan (COZAAR) 50 MG tablet TAKE ONE TABLET BY MOUTH TWICE A DAY 10/1/21  Yes Mario Eaton MD   metFORMIN (GLUCOPHAGE) 850 MG tablet TAKE ONE TABLET BY MOUTH TWICE A DAY 3/2/22  Yes Jacobo Redmond MD   montelukast (SINGULAIR) 10 MG tablet  9/16/21  Yes Meli Devlin MD   multivitamin with minerals tablet tablet Take 1 tablet by mouth Daily.   Yes Meli Devlin MD   ofloxacin (FLOXIN) 0.3 % otic solution  9/28/21  Yes Meli Devlin MD   oxybutynin (DITROPAN) 5 MG tablet Take 5 mg by mouth Daily.   Yes Provider, MD Meli   pantoprazole (PROTONIX) 40 MG EC tablet TAKE ONE TABLET BY MOUTH DAILY 3/2/22  Yes Jacobo Redmond MD   PARoxetine (PAXIL) 40 MG  "tablet Take 1 tablet by mouth Daily. 9/22/21  Yes Rissa Wang APRN   potassium chloride (MICRO-K) 10 MEQ CR capsule TAKE ONE CAPSULE BY MOUTH TWICE A DAY WITH FOOD 12/20/21  Yes Jacobo Redmond MD   pravastatin (PRAVACHOL) 80 MG tablet Take 1 tablet by mouth Daily. 9/22/21  Yes Rissa Wang APRN   fluconazole (Diflucan) 100 MG tablet Take 1 tablet by mouth 1 (One) Time As Needed (Vaginal yeast infection) for up to 1 dose. 12/20/21 3/9/22  Jacobo Redmond MD   fluconazole (Diflucan) 100 MG tablet Take 1 tablet by mouth Daily. 12/27/21 3/9/22  Jacobo Redmond MD   fluconazole (DIFLUCAN) 150 MG tablet Take 1 tablet by mouth Daily. 11/19/21 3/9/22  ProviderMeli MD   nitrofurantoin, macrocrystal-monohydrate, (MACROBID) 100 MG capsule Take 1 capsule by mouth 2 (Two) Times a Day. 11/19/21 3/9/22  Regina Wiley APRN   nitrofurantoin, macrocrystal-monohydrate, (Macrobid) 100 MG capsule Take 1 capsule by mouth 2 (Two) Times a Day. 12/27/21 3/9/22  Jacobo Redmond MD   phenazopyridine (PYRIDIUM) 100 MG tablet Take 1 tablet by mouth 3 (Three) Times a Day As Needed for Bladder Spasms. 11/19/21 3/9/22  Regina Wiley APRN   predniSONE (DELTASONE) 50 MG tablet Take 1 tablet by mouth Daily. 12/20/21 3/9/22  Jacobo Redmond MD   VITAMIN D PO Take  by mouth.  3/9/22  Provider, MD Meli        Review of Systems   Respiratory: Positive for shortness of breath. Negative for cough.    Cardiovascular: Positive for leg swelling. Negative for chest pain and palpitations.        Objective     Ht 152.4 cm (60\")   Wt 100 kg (221 lb)   BMI 43.16 kg/m²       Physical Exam  Constitutional:       General: She is awake.      Appearance: Normal appearance.   Neck:      Thyroid: No thyromegaly.      Vascular: No carotid bruit or JVD.   Cardiovascular:      Rate and Rhythm: Normal rate and regular rhythm.      Chest Wall: PMI is not displaced.      Pulses: Normal pulses.      Heart " sounds: S1 normal and S2 normal. Murmur heard.    Systolic murmur is present.    No friction rub. No gallop. No S3 or S4 sounds.   Pulmonary:      Effort: Pulmonary effort is normal.      Breath sounds: Normal breath sounds and air entry. No wheezing, rhonchi or rales.   Abdominal:      General: Bowel sounds are normal.      Palpations: Abdomen is soft. There is no mass.      Tenderness: There is no abdominal tenderness.   Musculoskeletal:      Cervical back: Neck supple.      Right lower leg: No edema.      Left lower leg: No edema.   Neurological:      Mental Status: She is alert and oriented to person, place, and time.   Psychiatric:         Mood and Affect: Mood normal.         Behavior: Behavior is cooperative.       Lab Results   Component Value Date    PROBNP 77.4 10/26/2021    PROBNP 72.2 06/14/2021    BNP 52 04/16/2019     CMP    CMP 6/14/21 9/21/21 10/26/21   Glucose 143 (A) 140 (A) 182 (A)   BUN 23 32 (A) 22   Creatinine 1.08 (A) 1.32 (A) 1.12 (A)   eGFR Non  Am 48 (A) 38 (A) 46 (A)   Sodium 137 137 136   Potassium 4.7 5.0 5.3 (A)   Chloride 95 (A) 95 (A) 96 (A)   Calcium 9.5 10.0 9.3   Albumin 4.10 4.10 4.10   Total Bilirubin 0.3 0.3 0.3   Alkaline Phosphatase 73 68 69   AST (SGOT) 9 12 12   ALT (SGPT) 9 14 16   (A) Abnormal value            CBC w/diff    CBC w/Diff 5/18/21 6/14/21 10/26/21   WBC 12.08 (A) 12.87 (A) 12.49 (A)   RBC 4.29 4.38 4.03   Hemoglobin 11.3 (A) 11.8 (A) 11.0 (A)   Hematocrit 36.4 (A) 37.5 36.0   MCV 84.8 85.6 89.3   MCH 26.3 (A) 26.9 27.3   MCHC 31.0 (A) 31.5 30.6 (A)   RDW 16.6 (A) 14.9 13.9   Platelets 327 360 334   Neutrophil Rel % 78.0 79.3 (A) 75.7   Immature Granulocyte Rel %  0.4 0.4   Lymphocyte Rel % 10.4 (A) 10.3 (A) 13.5 (A)   Monocyte Rel % 7.8 6.8 8.0   Eosinophil Rel % 2.7 2.9 2.2   Basophil Rel % 0.5 0.3 0.2   (A) Abnormal value             Lipid Panel    Lipid Panel 5/18/21 9/21/21   Total Cholesterol  147   Total Cholesterol 132    Triglycerides 105 128    HDL Cholesterol 51 46   VLDL Cholesterol 21 23   LDL Cholesterol  60 (A) 78   LDL/HDL Ratio  1.64   (A) Abnormal value       Comments are available for some flowsheets but are not being displayed.            Lab Results   Component Value Date    TSH 2.590 11/16/2020    TSH 2.860 11/05/2019    TSH 3.570 08/15/2019      Lab Results   Component Value Date    FREET4 1.2 08/15/2019      No results found for: DDIMERQUANT  No results found for: MG   No results found for: DIGOXIN   A1C Last 3 Results    HGBA1C Last 3 Results 5/18/21 10/26/21   Hemoglobin A1C 6.7 (A) 6.94 (A)   (A) Abnormal value       Comments are available for some flowsheets but are not being displayed.                      Result Review :                    ECG 12 Lead    Date/Time: 3/9/2022 10:19 AM  Performed by: Mario Eaton MD  Authorized by: Mario Eaton MD   Comments: Sinus rhythm low voltage precordial leads.  No other changes noted.  No change compared to previous EKG of 7/15/2020                Assessment and Plan        Diagnoses and all orders for this visit:    1. Nonrheumatic mitral valve regurgitation (Primary)  Assessment & Plan:  She has nonrheumatic mitral regurgitation.  In addition, she has a basal systolic murmur which is suggestive of an aortic valve disease.  I am going to go ahead and schedule her for an echocardiogram to evaluate both    Orders:  -     Adult Transthoracic Echo Complete W/ Cont if Necessary Per Protocol; Future    2. Atherosclerosis of native coronary artery of native heart without angina pectoris  Assessment & Plan:  Her coronary artery disease is stable.  She will continue pravastatin and the current dosage.  I have indicated to her to send us a copy of her lipid profile chemistry profile when she gets them done for Dr. Redmond in May.  Carvedilol will be continued in the current dose of 12.5 mg 1-1/2 tablet twice daily.  It was recently refilled.  She will also continue with amlodipine 5 mg once a day  both for coronary disease angina and hypertension      3. Essential hypertension  -     Adult Transthoracic Echo Complete W/ Cont if Necessary Per Protocol; Future    4. Pure hypercholesterolemia    5. Stage 2 chronic kidney disease    6. Class 3 severe obesity due to excess calories with serious comorbidity and body mass index (BMI) of 40.0 to 44.9 in adult (HCC)  Assessment & Plan:  Emphasized to her need for weight loss.  She does not walk much because of arthritis.            Follow Up     Return in about 6 months (around 9/9/2022) for Echo next avaiable.  next ov with June.    Patient was given instructions and counseling regarding her condition or for health maintenance advice. Please see specific information pulled into the AVS if appropriate.     Mario Eaton MD  03/09/22 09:37 EST

## 2022-03-09 NOTE — ASSESSMENT & PLAN NOTE
Her coronary artery disease is stable.  She will continue pravastatin and the current dosage.  I have indicated to her to send us a copy of her lipid profile chemistry profile when she gets them done for Dr. Redmond in May.  Carvedilol will be continued in the current dose of 12.5 mg 1-1/2 tablet twice daily.  It was recently refilled.  She will also continue with amlodipine 5 mg once a day both for coronary disease angina and hypertension

## 2022-03-09 NOTE — ASSESSMENT & PLAN NOTE
She has nonrheumatic mitral regurgitation.  In addition, she has a basal systolic murmur which is suggestive of an aortic valve disease.  I am going to go ahead and schedule her for an echocardiogram to evaluate both

## 2022-03-14 ENCOUNTER — TELEPHONE (OUTPATIENT)
Dept: CARDIOLOGY | Facility: CLINIC | Age: 87
End: 2022-03-14

## 2022-03-14 ENCOUNTER — APPOINTMENT (OUTPATIENT)
Dept: RESPIRATORY THERAPY | Facility: HOSPITAL | Age: 87
End: 2022-03-14

## 2022-03-14 RX ORDER — OXYBUTYNIN CHLORIDE 5 MG/1
TABLET ORAL
Qty: 60 TABLET | Refills: 5 | Status: SHIPPED | OUTPATIENT
Start: 2022-03-14 | End: 2022-09-15 | Stop reason: ALTCHOICE

## 2022-03-14 NOTE — TELEPHONE ENCOUNTER
Pt states at her last appt she asked to be put on the nitro spray but it was never sent over to the pharmacy. Can you please send it over? I

## 2022-03-15 DIAGNOSIS — I25.10 ATHEROSCLEROSIS OF NATIVE CORONARY ARTERY OF NATIVE HEART WITHOUT ANGINA PECTORIS: Primary | ICD-10-CM

## 2022-03-15 RX ORDER — NITROGLYCERIN 0.4 MG/1
TABLET SUBLINGUAL
Qty: 100 TABLET | Refills: 11 | Status: SHIPPED | OUTPATIENT
Start: 2022-03-15

## 2022-03-15 NOTE — TELEPHONE ENCOUNTER
Ordered.   

## 2022-03-22 ENCOUNTER — TELEPHONE (OUTPATIENT)
Dept: INTERNAL MEDICINE | Facility: CLINIC | Age: 87
End: 2022-03-22

## 2022-03-22 RX ORDER — DOXYCYCLINE HYCLATE 100 MG/1
100 CAPSULE ORAL 2 TIMES DAILY
Qty: 20 CAPSULE | Refills: 0 | Status: SHIPPED | OUTPATIENT
Start: 2022-03-22 | End: 2022-08-29

## 2022-03-22 NOTE — TELEPHONE ENCOUNTER
Caller: Devi Diallo    Relationship: Self    Best call back number: [phone]    What medication are you requesting: ANTIBIOTIC FOR HER YEARLY BRONCHITIS    What are your current symptoms: ALLERGIES AND WILL GO INTO BRONCHITIS    How long have you been experiencing symptoms: DID NOT MENTION    Have you had these symptoms before:    [x] Yes  [] No    Have you been treated for these symptoms before:   [x] Yes  [] No    If a prescription is needed, what is your preferred pharmacy and phone number:     [facility] [address], [place] - [address] AT [facility] [address] - [phone]  - [phone]   [phone]     Additional notes: PLEASE CALL TO PATIENT AND LET HER KNOW EITHER WAY IF SOMETHING IS SENT OR NOT    IF SENDING SOMETHING IN PLEASE ALSO SEND IN DIFLUCAN AS WELL

## 2022-03-24 ENCOUNTER — TELEPHONE (OUTPATIENT)
Dept: CARDIOLOGY | Facility: CLINIC | Age: 87
End: 2022-03-24

## 2022-03-24 NOTE — TELEPHONE ENCOUNTER
S/W patient regarding results of echo. Patient denies any SOB. Advised to call if SOB worsens and to keep f/u as scheduled.

## 2022-04-20 ENCOUNTER — LAB (OUTPATIENT)
Dept: LAB | Facility: HOSPITAL | Age: 87
End: 2022-04-20

## 2022-04-20 DIAGNOSIS — I10 ESSENTIAL HYPERTENSION: ICD-10-CM

## 2022-04-20 DIAGNOSIS — E66.01 CLASS 3 SEVERE OBESITY DUE TO EXCESS CALORIES WITH SERIOUS COMORBIDITY AND BODY MASS INDEX (BMI) OF 40.0 TO 44.9 IN ADULT: ICD-10-CM

## 2022-04-20 DIAGNOSIS — D50.9 IRON DEFICIENCY ANEMIA, UNSPECIFIED IRON DEFICIENCY ANEMIA TYPE: ICD-10-CM

## 2022-04-20 DIAGNOSIS — I25.10 ATHEROSCLEROSIS OF NATIVE CORONARY ARTERY OF NATIVE HEART WITHOUT ANGINA PECTORIS: ICD-10-CM

## 2022-04-20 DIAGNOSIS — N18.2 STAGE 2 CHRONIC KIDNEY DISEASE: ICD-10-CM

## 2022-04-20 DIAGNOSIS — E78.00 PURE HYPERCHOLESTEROLEMIA: ICD-10-CM

## 2022-04-20 DIAGNOSIS — E13.9 DIABETES 1.5, MANAGED AS TYPE 2: ICD-10-CM

## 2022-04-20 LAB
ALBUMIN SERPL-MCNC: 4.2 G/DL (ref 3.5–5.2)
ALBUMIN/GLOB SERPL: 1.4 G/DL
ALP SERPL-CCNC: 72 U/L (ref 39–117)
ALT SERPL W P-5'-P-CCNC: 16 U/L (ref 1–33)
ANION GAP SERPL CALCULATED.3IONS-SCNC: 14 MMOL/L (ref 5–15)
AST SERPL-CCNC: 17 U/L (ref 1–32)
BASOPHILS # BLD AUTO: 0.05 10*3/MM3 (ref 0–0.2)
BASOPHILS NFR BLD AUTO: 0.4 % (ref 0–1.5)
BILIRUB SERPL-MCNC: 0.3 MG/DL (ref 0–1.2)
BUN SERPL-MCNC: 17 MG/DL (ref 8–23)
BUN/CREAT SERPL: 17.9 (ref 7–25)
CALCIUM SPEC-SCNC: 9.5 MG/DL (ref 8.6–10.5)
CHLORIDE SERPL-SCNC: 98 MMOL/L (ref 98–107)
CHOLEST SERPL-MCNC: 150 MG/DL (ref 0–200)
CO2 SERPL-SCNC: 25 MMOL/L (ref 22–29)
CREAT SERPL-MCNC: 0.95 MG/DL (ref 0.57–1)
DEPRECATED RDW RBC AUTO: 44.8 FL (ref 37–54)
EGFRCR SERPLBLD CKD-EPI 2021: 59.2 ML/MIN/1.73
EOSINOPHIL # BLD AUTO: 0.26 10*3/MM3 (ref 0–0.4)
EOSINOPHIL NFR BLD AUTO: 2.2 % (ref 0.3–6.2)
ERYTHROCYTE [DISTWIDTH] IN BLOOD BY AUTOMATED COUNT: 15.6 % (ref 12.3–15.4)
FERRITIN SERPL-MCNC: 17.1 NG/ML (ref 13–150)
GLOBULIN UR ELPH-MCNC: 3 GM/DL
GLUCOSE SERPL-MCNC: 120 MG/DL (ref 65–99)
HBA1C MFR BLD: 6.1 % (ref 4.8–5.6)
HCT VFR BLD AUTO: 33.4 % (ref 34–46.6)
HDLC SERPL-MCNC: 47 MG/DL (ref 40–60)
HGB BLD-MCNC: 10.4 G/DL (ref 12–15.9)
IMM GRANULOCYTES # BLD AUTO: 0.04 10*3/MM3 (ref 0–0.05)
IMM GRANULOCYTES NFR BLD AUTO: 0.3 % (ref 0–0.5)
IRON 24H UR-MRATE: 31 MCG/DL (ref 37–145)
IRON SATN MFR SERPL: 7 % (ref 20–50)
LDLC SERPL CALC-MCNC: 79 MG/DL (ref 0–100)
LDLC/HDLC SERPL: 1.62 {RATIO}
LYMPHOCYTES # BLD AUTO: 1.49 10*3/MM3 (ref 0.7–3.1)
LYMPHOCYTES NFR BLD AUTO: 12.7 % (ref 19.6–45.3)
MAGNESIUM SERPL-MCNC: 1.7 MG/DL (ref 1.6–2.4)
MCH RBC QN AUTO: 24.9 PG (ref 26.6–33)
MCHC RBC AUTO-ENTMCNC: 31.1 G/DL (ref 31.5–35.7)
MCV RBC AUTO: 80.1 FL (ref 79–97)
MONOCYTES # BLD AUTO: 0.73 10*3/MM3 (ref 0.1–0.9)
MONOCYTES NFR BLD AUTO: 6.2 % (ref 5–12)
NEUTROPHILS NFR BLD AUTO: 78.2 % (ref 42.7–76)
NEUTROPHILS NFR BLD AUTO: 9.18 10*3/MM3 (ref 1.7–7)
NRBC BLD AUTO-RTO: 0 /100 WBC (ref 0–0.2)
PLATELET # BLD AUTO: 368 10*3/MM3 (ref 140–450)
PMV BLD AUTO: 9.1 FL (ref 6–12)
POTASSIUM SERPL-SCNC: 4.8 MMOL/L (ref 3.5–5.2)
PROT SERPL-MCNC: 7.2 G/DL (ref 6–8.5)
RBC # BLD AUTO: 4.17 10*6/MM3 (ref 3.77–5.28)
SODIUM SERPL-SCNC: 137 MMOL/L (ref 136–145)
TIBC SERPL-MCNC: 472 MCG/DL (ref 298–536)
TRANSFERRIN SERPL-MCNC: 317 MG/DL (ref 200–360)
TRIGL SERPL-MCNC: 135 MG/DL (ref 0–150)
VLDLC SERPL-MCNC: 24 MG/DL (ref 5–40)
WBC NRBC COR # BLD: 11.75 10*3/MM3 (ref 3.4–10.8)

## 2022-04-20 PROCEDURE — 80061 LIPID PANEL: CPT

## 2022-04-20 PROCEDURE — 83735 ASSAY OF MAGNESIUM: CPT

## 2022-04-20 PROCEDURE — 85025 COMPLETE CBC W/AUTO DIFF WBC: CPT

## 2022-04-20 PROCEDURE — 80053 COMPREHEN METABOLIC PANEL: CPT

## 2022-04-20 PROCEDURE — 83540 ASSAY OF IRON: CPT

## 2022-04-20 PROCEDURE — 36415 COLL VENOUS BLD VENIPUNCTURE: CPT

## 2022-04-20 PROCEDURE — 83036 HEMOGLOBIN GLYCOSYLATED A1C: CPT

## 2022-04-20 PROCEDURE — 84466 ASSAY OF TRANSFERRIN: CPT

## 2022-04-20 PROCEDURE — 82728 ASSAY OF FERRITIN: CPT

## 2022-04-25 ENCOUNTER — OFFICE VISIT (OUTPATIENT)
Dept: INTERNAL MEDICINE | Facility: CLINIC | Age: 87
End: 2022-04-25

## 2022-04-25 VITALS
OXYGEN SATURATION: 93 % | HEART RATE: 78 BPM | TEMPERATURE: 97.8 F | BODY MASS INDEX: 42.37 KG/M2 | HEIGHT: 60 IN | WEIGHT: 215.8 LBS | SYSTOLIC BLOOD PRESSURE: 153 MMHG | DIASTOLIC BLOOD PRESSURE: 61 MMHG

## 2022-04-25 DIAGNOSIS — K21.9 GASTRO-ESOPHAGEAL REFLUX DISEASE WITHOUT ESOPHAGITIS: ICD-10-CM

## 2022-04-25 DIAGNOSIS — D50.9 IRON DEFICIENCY ANEMIA, UNSPECIFIED IRON DEFICIENCY ANEMIA TYPE: ICD-10-CM

## 2022-04-25 DIAGNOSIS — E66.01 CLASS 3 SEVERE OBESITY DUE TO EXCESS CALORIES WITH SERIOUS COMORBIDITY AND BODY MASS INDEX (BMI) OF 40.0 TO 44.9 IN ADULT: ICD-10-CM

## 2022-04-25 DIAGNOSIS — N39.41 URGE INCONTINENCE OF URINE: ICD-10-CM

## 2022-04-25 DIAGNOSIS — E11.9 TYPE 2 DIABETES MELLITUS WITHOUT COMPLICATION, WITHOUT LONG-TERM CURRENT USE OF INSULIN: ICD-10-CM

## 2022-04-25 DIAGNOSIS — I34.0 NONRHEUMATIC MITRAL VALVE REGURGITATION: ICD-10-CM

## 2022-04-25 DIAGNOSIS — I25.10 ATHEROSCLEROSIS OF NATIVE CORONARY ARTERY OF NATIVE HEART WITHOUT ANGINA PECTORIS: ICD-10-CM

## 2022-04-25 DIAGNOSIS — I10 ESSENTIAL HYPERTENSION: ICD-10-CM

## 2022-04-25 DIAGNOSIS — M19.90 ARTHRITIS: Primary | ICD-10-CM

## 2022-04-25 PROCEDURE — 99214 OFFICE O/P EST MOD 30 MIN: CPT | Performed by: INTERNAL MEDICINE

## 2022-04-25 RX ORDER — FERROUS SULFATE 325(65) MG
325 TABLET ORAL
Qty: 90 TABLET | Refills: 3 | Status: SHIPPED | OUTPATIENT
Start: 2022-04-25

## 2022-04-25 NOTE — PROGRESS NOTES
"Chief Complaint/ HPI: Patient is here to follow-up with anemia iron deficiency, diabetes, lower extremity edema she gets leg cramps if she takes her Lasix twice a day, she has been also taking her blood sugars which have been coming down, and her labs are being reviewed today, here with her daughter,          Objective   Vital Signs  Vitals:    04/25/22 1425   BP: 153/61   Pulse: 78   Temp: 97.8 °F (36.6 °C)   SpO2: 93%   Weight: 97.9 kg (215 lb 12.8 oz)   Height: 152.4 cm (60\")      Body mass index is 42.15 kg/m².  Review of Systems   Constitutional: Negative.    HENT: Negative.    Eyes: Negative.    Respiratory: Negative.    Cardiovascular: Negative.    Gastrointestinal: Negative.    Endocrine: Negative.    Genitourinary: Negative.    Musculoskeletal: Negative.    Allergic/Immunologic: Negative.    Neurological: Negative.    Hematological: Negative.    Psychiatric/Behavioral: Negative.       Physical Exam  Constitutional:       General: She is not in acute distress.     Appearance: Normal appearance. She is obese.   HENT:      Head: Normocephalic.      Mouth/Throat:      Mouth: Mucous membranes are moist.   Eyes:      Conjunctiva/sclera: Conjunctivae normal.      Pupils: Pupils are equal, round, and reactive to light.   Cardiovascular:      Rate and Rhythm: Normal rate and regular rhythm.      Pulses: Normal pulses.      Heart sounds: Normal heart sounds.   Pulmonary:      Effort: Pulmonary effort is normal.      Breath sounds: Normal breath sounds.   Abdominal:      General: Bowel sounds are normal.      Palpations: Abdomen is soft.   Musculoskeletal:         General: No swelling. Normal range of motion.      Cervical back: Neck supple.      Right lower leg: Edema present.      Left lower leg: Edema present.   Skin:     General: Skin is warm and dry.      Coloration: Skin is not jaundiced.   Neurological:      General: No focal deficit present.      Mental Status: She is alert and oriented to person, place, and " time. Mental status is at baseline.   Psychiatric:         Mood and Affect: Mood normal.         Behavior: Behavior normal.         Thought Content: Thought content normal.         Judgment: Judgment normal.        Result Review :   Lab Results   Component Value Date    PROBNP 77.4 10/26/2021    PROBNP 72.2 06/14/2021    BNP 52 04/16/2019     CMP    CMP 9/21/21 10/26/21 4/20/22   Glucose 140 (A) 182 (A) 120 (A)   BUN 32 (A) 22 17   Creatinine 1.32 (A) 1.12 (A) 0.95   eGFR Non  Am 38 (A) 46 (A)    Sodium 137 136 137   Potassium 5.0 5.3 (A) 4.8   Chloride 95 (A) 96 (A) 98   Calcium 10.0 9.3 9.5   Albumin 4.10 4.10 4.20   Total Bilirubin 0.3 0.3 0.3   Alkaline Phosphatase 68 69 72   AST (SGOT) 12 12 17   ALT (SGPT) 14 16 16   (A) Abnormal value            CBC w/diff    CBC w/Diff 6/14/21 10/26/21 4/20/22   WBC 12.87 (A) 12.49 (A) 11.75 (A)   RBC 4.38 4.03 4.17   Hemoglobin 11.8 (A) 11.0 (A) 10.4 (A)   Hematocrit 37.5 36.0 33.4 (A)   MCV 85.6 89.3 80.1   MCH 26.9 27.3 24.9 (A)   MCHC 31.5 30.6 (A) 31.1 (A)   RDW 14.9 13.9 15.6 (A)   Platelets 360 334 368   Neutrophil Rel % 79.3 (A) 75.7 78.2 (A)   Immature Granulocyte Rel % 0.4 0.4 0.3   Lymphocyte Rel % 10.3 (A) 13.5 (A) 12.7 (A)   Monocyte Rel % 6.8 8.0 6.2   Eosinophil Rel % 2.9 2.2 2.2   Basophil Rel % 0.3 0.2 0.4   (A) Abnormal value             Lipid Panel    Lipid Panel 5/18/21 9/21/21 4/20/22   Total Cholesterol  147 150   Total Cholesterol 132     Triglycerides 105 128 135   HDL Cholesterol 51 46 47   VLDL Cholesterol 21 23 24   LDL Cholesterol  60 (A) 78 79   LDL/HDL Ratio  1.64 1.62   (A) Abnormal value       Comments are available for some flowsheets but are not being displayed.            Lab Results   Component Value Date    TSH 2.590 11/16/2020    TSH 2.860 11/05/2019    TSH 3.570 08/15/2019      Lab Results   Component Value Date    FREET4 1.2 08/15/2019      A1C Last 3 Results    HGBA1C Last 3 Results 5/18/21 10/26/21 4/20/22   Hemoglobin A1C 6.7  (A) 6.94 (A) 6.10 (A)   (A) Abnormal value       Comments are available for some flowsheets but are not being displayed.                             Visit Diagnoses:    ICD-10-CM ICD-9-CM   1. Arthritis  M19.90 716.90   2. Gastro-esophageal reflux disease without esophagitis  K21.9 530.81   3. Type 2 diabetes mellitus without complication, without long-term current use of insulin ([facility])  E11.9 250.00   4. Nonrheumatic mitral valve regurgitation  I34.0 424.0   5. Urge incontinence of urine  N39.41 788.31   6. Class 3 severe obesity due to excess calories with serious comorbidity and body mass index (BMI) of 40.0 to 44.9 in adult ([facility])  E66.01 278.01    Z68.41 V85.41   7. Essential hypertension  I10 401.9   8. Atherosclerosis of native coronary artery of native heart without angina pectoris  I25.10 414.01   9. Iron deficiency anemia, unspecified iron deficiency anemia type  D50.9 280.9       Assessment and Plan   Diagnoses and all orders for this visit:    1. Arthritis (Primary)  -     ferrous sulfate 325 (65 FE) MG tablet; Take 1 tablet by mouth Daily With Breakfast.  Dispense: 90 tablet; Refill: 3  -     CBC & Differential; Future  -     Comprehensive Metabolic Panel; Future  -     Iron Profile; Future  -     Ferritin; Future    2. Gastro-esophageal reflux disease without esophagitis  -     ferrous sulfate 325 (65 FE) MG tablet; Take 1 tablet by mouth Daily With Breakfast.  Dispense: 90 tablet; Refill: 3  -     CBC & Differential; Future  -     Comprehensive Metabolic Panel; Future  -     Iron Profile; Future  -     Ferritin; Future    3. Type 2 diabetes mellitus without complication, without long-term current use of insulin ([facility])  -     ferrous sulfate 325 (65 FE) MG tablet; Take 1 tablet by mouth Daily With Breakfast.  Dispense: 90 tablet; Refill: 3  -     CBC & Differential; Future  -     Comprehensive Metabolic Panel; Future  -     Iron Profile; Future  -     Ferritin; Future    4. Nonrheumatic mitral valve  regurgitation  -     ferrous sulfate 325 (65 FE) MG tablet; Take 1 tablet by mouth Daily With Breakfast.  Dispense: 90 tablet; Refill: 3  -     CBC & Differential; Future  -     Comprehensive Metabolic Panel; Future  -     Iron Profile; Future  -     Ferritin; Future    5. Urge incontinence of urine  -     ferrous sulfate 325 (65 FE) MG tablet; Take 1 tablet by mouth Daily With Breakfast.  Dispense: 90 tablet; Refill: 3  -     CBC & Differential; Future  -     Comprehensive Metabolic Panel; Future  -     Iron Profile; Future  -     Ferritin; Future    6. Class 3 severe obesity due to excess calories with serious comorbidity and body mass index (BMI) of 40.0 to 44.9 in adult ([facility])  -     ferrous sulfate 325 (65 FE) MG tablet; Take 1 tablet by mouth Daily With Breakfast.  Dispense: 90 tablet; Refill: 3  -     CBC & Differential; Future  -     Comprehensive Metabolic Panel; Future  -     Iron Profile; Future  -     Ferritin; Future    7. Essential hypertension  -     ferrous sulfate 325 (65 FE) MG tablet; Take 1 tablet by mouth Daily With Breakfast.  Dispense: 90 tablet; Refill: 3  -     CBC & Differential; Future  -     Comprehensive Metabolic Panel; Future  -     Iron Profile; Future  -     Ferritin; Future    8. Atherosclerosis of native coronary artery of native heart without angina pectoris  -     ferrous sulfate 325 (65 FE) MG tablet; Take 1 tablet by mouth Daily With Breakfast.  Dispense: 90 tablet; Refill: 3  -     CBC & Differential; Future  -     Comprehensive Metabolic Panel; Future  -     Iron Profile; Future  -     Ferritin; Future    9. Iron deficiency anemia, unspecified iron deficiency anemia type  -     ferrous sulfate 325 (65 FE) MG tablet; Take 1 tablet by mouth Daily With Breakfast.  Dispense: 90 tablet; Refill: 3  -     CBC & Differential; Future  -     Comprehensive Metabolic Panel; Future  -     Iron Profile; Future  -     Ferritin; Future        lower extremity edema --BR NP level low at 77,  clinically stable --- continue Lasix 20 mg daily, potassium 10 mEq  daily patient still has some edema, but only taking Lasix daily and potassium daily for now which we will monitor closely,    refractory upper respiratory infection cough wheezing asthma exacerbations been in the office several times has been on a tapering dose of steroids February 2019,- through November 2020,------will refer to pulmonology get a CT scan of the chest, continue breathing treatments for right now,--april 2019, Chest x-ray shows questionable infiltrate versus fullness in the right hilum right lung field,--- CBC shows a white count 14,000, eosinophil percent is very mildly elevated, SEEING DR BANKS --- [facility] CHIU, NOV 2019, ---PULM NOD. ON CT APRIL 2019, REPEATED CT ABD/PELVIS AND CHEST ---JUNE 2019,    AND THEN MRI OF ABD FOR RENAL MASS----- JULY, 2019,     ELEVATED WBC CHRONIC , STABLE - NOV 2020, through April 2022,--DID SEE DR BUCIO, NO RX     ALLERGRIES , CONT ZYRTEC AND SINGULAIR    HTN -- cont NORVASC 5 mg daily , Coreg 18.375 twice a day, losartan 50 bid,    IRON DEF ANEMIA, discussed treatment options to include IV iron, iron supplements over-the-counter, still iron deficient anemia, April 2022, will try iron over-the-counter supplements with vitamin C for 3 to 4 months and recheck, does not want to do colonoscopy endoscopy at this time,    DM , continue AMARYL 1 mg qam , stopping Amaryl 2 mg at bedtime,  , Januvia 50 mg daily, metformin 850 mg twice a day,----hemoglobin A1c up slightly at 6.1,, much improved April 2022,      OBESITY, discussed October 2021    DEPRESSION-   continue on Paxil 40 mg daily    ELEVATED CHOL-continues PRAVASTATIN 80 MG QHS     PARESH,-RESOLVED-patient in hospital-- 3/2014 with acute renal failure, anuric, required dialysis for several days, resolved     Urge inCONTINENCE-continues OXYBUTYNIN DISCUSSED--USING ONE DAILY, estrogen cream daily or twice weekly     NEUROPATHY, RX OPTIONS DISCUSSED  --B VITAMINS REC     EYE CHECKED --DOES YRLY, JAN 2021     DOES YRLY MAMMO IN [place] --OCT 2017, HAD DIAGN. MAMMO AND DID BX , BENIGN--LMAMMO NOV 2019, [place] ,--OK TO STOP MAY 2020,            Follow Up   Return in about 4 months (around 8/25/2022).  Patient was given instructions and counseling regarding her condition or for health maintenance advice. Please see specific information pulled into the AVS if appropriate.

## 2022-05-04 ENCOUNTER — TELEPHONE (OUTPATIENT)
Dept: CARDIOLOGY | Facility: CLINIC | Age: 87
End: 2022-05-04

## 2022-05-04 RX ORDER — EZETIMIBE 10 MG/1
10 TABLET ORAL DAILY
Qty: 90 TABLET | Refills: 3 | Status: SHIPPED | OUTPATIENT
Start: 2022-05-04

## 2022-05-04 NOTE — TELEPHONE ENCOUNTER
Patient is aware, verbalized understanding. Patient states she has been on the lipitor before and it caused her cramping in her legs. Agreed to add the zetia.

## 2022-05-04 NOTE — TELEPHONE ENCOUNTER
----- Message from Mario Eaton MD sent at 5/3/2022  6:20 PM EDT -----  LDL still running a little high.  Her LDL needs to be below 70.  Advised her to add Zetia 10 mg or preferably switch to Lipitor 20 mg a day without Zetia.    Iron level is low.  Discuss with Dr. Redmond

## 2022-05-06 ENCOUNTER — TELEPHONE (OUTPATIENT)
Dept: CARDIOLOGY | Facility: CLINIC | Age: 87
End: 2022-05-06

## 2022-05-17 ENCOUNTER — OFFICE VISIT (OUTPATIENT)
Dept: PULMONOLOGY | Facility: CLINIC | Age: 87
End: 2022-05-17

## 2022-05-17 VITALS
OXYGEN SATURATION: 93 % | HEART RATE: 71 BPM | TEMPERATURE: 98.2 F | SYSTOLIC BLOOD PRESSURE: 169 MMHG | RESPIRATION RATE: 16 BRPM | WEIGHT: 216 LBS | DIASTOLIC BLOOD PRESSURE: 50 MMHG | HEIGHT: 60 IN | BODY MASS INDEX: 42.41 KG/M2

## 2022-05-17 DIAGNOSIS — J30.2 SEASONAL ALLERGIES: ICD-10-CM

## 2022-05-17 DIAGNOSIS — N18.2 STAGE 2 CHRONIC KIDNEY DISEASE: Chronic | ICD-10-CM

## 2022-05-17 DIAGNOSIS — J96.11 CHRONIC RESPIRATORY FAILURE WITH HYPOXIA: ICD-10-CM

## 2022-05-17 DIAGNOSIS — E78.00 HYPERCHOLESTEROLEMIA: ICD-10-CM

## 2022-05-17 DIAGNOSIS — J30.9 ALLERGIC RHINITIS, UNSPECIFIED SEASONALITY, UNSPECIFIED TRIGGER: ICD-10-CM

## 2022-05-17 DIAGNOSIS — I50.9 HEART FAILURE, UNSPECIFIED HF CHRONICITY, UNSPECIFIED HEART FAILURE TYPE: ICD-10-CM

## 2022-05-17 DIAGNOSIS — E13.9 DIABETES 1.5, MANAGED AS TYPE 2: ICD-10-CM

## 2022-05-17 DIAGNOSIS — R06.09 DYSPNEA ON EXERTION: ICD-10-CM

## 2022-05-17 DIAGNOSIS — J45.40 MODERATE PERSISTENT ASTHMA, UNSPECIFIED WHETHER COMPLICATED: ICD-10-CM

## 2022-05-17 DIAGNOSIS — I10 ESSENTIAL HYPERTENSION: Chronic | ICD-10-CM

## 2022-05-17 DIAGNOSIS — J42 CHRONIC BRONCHITIS, UNSPECIFIED CHRONIC BRONCHITIS TYPE: Primary | ICD-10-CM

## 2022-05-17 DIAGNOSIS — R05.3 CHRONIC COUGH: ICD-10-CM

## 2022-05-17 DIAGNOSIS — E66.01 MORBID OBESITY WITH BMI OF 40.0-44.9, ADULT: ICD-10-CM

## 2022-05-17 PROCEDURE — 99213 OFFICE O/P EST LOW 20 MIN: CPT | Performed by: NURSE PRACTITIONER

## 2022-05-17 RX ORDER — ZINC 25 MG
25 TABLET ORAL
COMMUNITY

## 2022-05-17 RX ORDER — MULTIVIT-MIN/IRON/FOLIC ACID/K 18-600-40
500 CAPSULE ORAL
COMMUNITY

## 2022-05-17 NOTE — PATIENT INSTRUCTIONS
Obesity, Adult  Obesity is the condition of having too much total body fat. Being overweight or obese means that your weight is greater than what is considered healthy for your body size. Obesity is determined by a measurement called BMI. BMI is an estimate of body fat and is calculated from height and weight. For adults, a BMI of 30 or higher is considered obese.  Obesity can lead to other health concerns and major illnesses, including:  Stroke.  Coronary artery disease (CAD).  Type 2 diabetes.  Some types of cancer, including cancers of the colon, breast, uterus, and gallbladder.  Osteoarthritis.  High blood pressure (hypertension).  High cholesterol.  Sleep apnea.  Gallbladder stones.  Infertility problems.  What are the causes?  Common causes of this condition include:  Eating daily meals that are high in calories, sugar, and fat.  Being born with genes that may make you more likely to become obese.  Having a medical condition that causes obesity, including:  Hypothyroidism.  Polycystic ovarian syndrome (PCOS).  Binge-eating disorder.  Cushing syndrome.  Taking certain medicines, such as steroids, antidepressants, and seizure medicines.  Not being physically active (sedentary lifestyle).  Not getting enough sleep.  Drinking high amounts of sugar-sweetened beverages, such as soft drinks.  What increases the risk?  The following factors may make you more likely to develop this condition:  Having a family history of obesity.  Being a woman of  descent.  Being a man of  descent.  Living in an area with limited access to:  [place], recreation centers, or sidewalks.  Healthy food choices, such as grocery stores and [employer]' markets.  What are the signs or symptoms?  The main sign of this condition is having too much body fat.  How is this diagnosed?  This condition is diagnosed based on:  Your BMI. If you are an adult with a BMI of 30 or higher, you are considered obese.  Your waist  circumference. This measures the distance around your waistline.  Your skinfold thickness. Your health care provider may gently pinch a fold of your skin and measure it.  You may have other tests to check for underlying conditions.  How is this treated?  Treatment for this condition often includes changing your lifestyle. Treatment may include some or all of the following:  Dietary changes. This may include developing a healthy meal plan.  Regular physical activity. This may include activity that causes your heart to beat faster (aerobic exercise) and strength training. Work with your health care provider to design an exercise program that works for you.  Medicine to help you lose weight if you are unable to lose 1 pound a week after 6 weeks of healthy eating and more physical activity.  Treating conditions that cause the obesity (underlying conditions).  Surgery. Surgical options may include gastric banding and gastric bypass. Surgery may be done if:  Other treatments have not helped to improve your condition.  You have a BMI of 40 or higher.  You have life-threatening health problems related to obesity.  Follow these instructions at home:  Eating and drinking    Follow recommendations from your health care provider about what you eat and drink. Your health care provider may advise you to:  Limit fast food, sweets, and processed snack foods.  Choose low-fat options, such as low-fat milk instead of whole milk.  Eat 5 or more servings of fruits or vegetables every day.  Eat at home more often. This gives you more control over what you eat.  Choose healthy foods when you eat out.  Learn to read food labels. This will help you understand how much food is considered 1 serving.  Learn what a healthy serving size is.  Keep low-fat snacks available.  Limit sugary drinks, such as soda, fruit juice, sweetened iced tea, and flavored milk.  Drink enough water to keep your urine pale yellow.  Do not follow a fad diet. Fad diets  can be unhealthy and even dangerous.    Physical activity  Exercise regularly, as told by your health care provider.  Most adults should get up to 150 minutes of moderate-intensity exercise every week.  Ask your health care provider what types of exercise are safe for you and how often you should exercise.  Warm up and stretch before being active.  Cool down and stretch after being active.  Rest between periods of activity.  Lifestyle  Work with your health care provider and a dietitian to set a weight-loss goal that is healthy and reasonable for you.  Limit your screen time.  Find ways to reward yourself that do not involve food.  Do not drink alcohol if:  Your health care provider tells you not to drink.  You are pregnant, may be pregnant, or are planning to become pregnant.  If you drink alcohol:  Limit how much you use to:  0-1 drink a day for women.  0-2 drinks a day for men.  Be aware of how much alcohol is in your drink. In the [place] one drink equals one 12 oz bottle of beer (355 mL), one 5 oz glass of wine (148 mL), or one 1½ oz glass of hard liquor (44 mL).  General instructions  Keep a weight-loss journal to keep track of the food you eat and how much exercise you get.  Take over-the-counter and prescription medicines only as told by your health care provider.  Take vitamins and supplements only as told by your health care provider.  Consider joining a support group. Your health care provider may be able to recommend a support group.  Keep all follow-up visits as told by your health care provider. This is important.  Contact a health care provider if:  You are unable to meet your weight loss goal after 6 weeks of dietary and lifestyle changes.  Get help right away if you are having:  Trouble breathing.  Suicidal thoughts or behaviors.  Summary  Obesity is the condition of having too much total body fat.  Being overweight or obese means that your weight is greater than what is considered healthy for your  body size.  Work with your health care provider and a dietitian to set a weight-loss goal that is healthy and reasonable for you.  Exercise regularly, as told by your health care provider. Ask your health care provider what types of exercise are safe for you and how often you should exercise.  This information is not intended to replace advice given to you by your health care provider. Make sure you discuss any questions you have with your health care provider.  Document Revised: 08/22/2019 Document Reviewed: 08/22/2019  [employer]vier Patient Education © 2021 [employer]

## 2022-05-17 NOTE — PROGRESS NOTES
Primary Care Provider  Jacobo Redmond MD     Referring Provider  No ref. provider found     Chief Complaint  Bronchitis (3 month f/u ), Shortness of Breath (No more than usual. ), and Cough (Sometimes productive- Phlegm )    Subjective          Devi Diallo presents to [facility] PULMONARY & [facility]ITICAL CARE MEDICINE  History of Present Illness  Devi Diallo is a 86 y.o. female patient of Dr. Timmons here for management of chronic bronchitis, moderate persistent asthma, cough, seasonal allergies, diabetes, stage II kidney disease, dyspnea on exertion, hypertension, obesity, and hypoxia.     Patient states she is doing well since her last visit.  She denies using any antibiotics or steroids for her lungs.  She denies any fevers or chills.  She does have an occasionally productive cough with normal colored phlegm.  Her shortness of breath is at baseline.  She describes it as mild to moderate in severity, worse with exertion and improved with rest.  She continues to use Breo as prescribed.  She uses her albuterol inhaler twice a week.  She is also on Singulair and Zyrtec for allergies.  Patient states that she is scheduled to see ENT soon for her ears and patient's daughter states that she also needs to see the hearing aid doctor soon.  Patient's diabetes is being managed by her primary care provider.  She continues to follow-up with cardiology for management of hypertension and hyperlipidemia.  Patient states that her medications are being changed and she is being added on cholesterol medication.  Overall, patient has no concerns at this time.  She is able to perform her ADLs with the use of a walker.  She is up-to-date with her flu, pneumonia, and COVID vaccines.       Her history of smoking is   Tobacco Use: Low Risk    • Smoking Tobacco Use: Never Smoker   • Smokeless Tobacco Use: Never Used   .    Review of Systems   Constitutional: Negative for chills, fatigue, fever, unexpected  weight gain and unexpected weight loss.   HENT: Negative for congestion (Nasal), hearing loss, mouth sores, nosebleeds, postnasal drip, sore throat and trouble swallowing.    Eyes: Negative for blurred vision and visual disturbance.   Respiratory: Positive for cough and shortness of breath. Negative for apnea and wheezing.         Negative for Hemoptysis     Cardiovascular: Negative for chest pain, palpitations and leg swelling.   Gastrointestinal: Negative for abdominal pain, constipation, diarrhea, nausea, vomiting and GERD.        Negative for Jaundice  Negative for Bloating  Negative for Melena   Musculoskeletal: Negative for joint swelling and myalgias.        Negative for Joint pain  Negative for Joint stiffness   Skin: Negative for color change.        Negative for cyanosis   Neurological: Negative for syncope, weakness, numbness and headache.      Sleep: Negative for Excessive daytime sleepiness  Negative for morning headaches  Negative for Snoring    Family History   Problem Relation Age of Onset   • Cancer Mother         Unspecified   • Arthritis Mother    • Diabetes Brother         Unspecified type   • Arthritis Brother         Social History     Socioeconomic History   • Marital status:    Tobacco Use   • Smoking status: Never Smoker   • Smokeless tobacco: Never Used   Vaping Use   • Vaping Use: Never used   Substance and Sexual Activity   • Alcohol use: Never   • Drug use: Never   • Sexual activity: Defer        Past Medical History:   Diagnosis Date   • Arthritis    • Atherosclerotic heart disease of native coronary artery without angina pectoris 1/1/2002    cardiac catheterization 2002   • Diabetes (HCC)    • Essential hypertension 9/20/2012   • Hyperlipemia    • Hypertension    • Pure hypercholesterolemia 4/25/2014   • Reflux esophagitis    • Seasonal allergies    • Stage 2 chronic kidney disease 9/20/2012        Immunization History   Administered Date(s) Administered   • COVID-19 (MODERNA)  1st, 2nd, 3rd Dose Only 02/18/2021, 03/19/2021   • Fluzone High Dose =>65 Years ([employer] ONLY) 10/16/2020   • Fluzone High-Dose 65+yrs 10/08/2021   • Pneumococcal, Unspecified 10/20/2019, 10/20/2019         Allergies   Allergen Reactions   • Amoxicillin-Pot Clavulanate Hives and Unknown - Low Severity   • Cipro [Ciprofloxacin Hcl] Hives   • Ciprofloxacin Unknown - Low Severity   • Clindamycin Unknown - Low Severity   • Penicillins Hives   • Sulfamethoxazole-Trimethoprim Nausea Only and Unknown - Low Severity   • Clindamycin/Lincomycin Rash          Current Outpatient Medications:   •  albuterol sulfate  (90 Base) MCG/ACT inhaler, Inhale 2 puffs Every 4 (Four) Hours As Needed for Wheezing., Disp: , Rfl:   •  amLODIPine (NORVASC) 5 MG tablet, TAKE ONE TABLET BY MOUTH DAILY, Disp: 90 tablet, Rfl: 3  •  Ascorbic Acid (Vitamin C) 500 MG capsule, Take 500 mg by mouth., Disp: , Rfl:   •  Aspirin 81 MG capsule, Daily., Disp: , Rfl:   •  Breo Ellipta 200-25 MCG/INH inhaler, Inhale 1 puff Daily., Disp: 60 each, Rfl: 11  •  carvedilol (COREG) 12.5 MG tablet, TAKE ONE AND A HALF TABLET BY MOUTH TWO TIMES A DAY, Disp: 270 tablet, Rfl: 2  •  cetirizine (zyrTEC) 10 MG tablet, Take 10 mg by mouth Daily., Disp: , Rfl:   •  cholecalciferol (VITAMIN D3) 25 MCG (1000 UT) tablet, Take 1,000 Units by mouth Daily., Disp: , Rfl:   •  clobetasol (TEMOVATE) 0.05 % cream, Apply 1 application topically to the appropriate area as directed 2 (Two) Times a Day., Disp: 45 g, Rfl: 3  •  Cranberry 1000 MG capsule, Take 1,000 mg by mouth., Disp: , Rfl:   •  estradiol (ESTRACE) 0.1 MG/GM vaginal cream, Insert 2 g into the vagina Daily., Disp: 42.5 g, Rfl: 12  •  ezetimibe (ZETIA) 10 MG tablet, Take 1 tablet by mouth Daily., Disp: 90 tablet, Rfl: 3  •  ferrous sulfate 325 (65 FE) MG tablet, Take 1 tablet by mouth Daily With Breakfast., Disp: 90 tablet, Rfl: 3  •  furosemide (LASIX) 20 MG tablet, TAKE ONE TABLET BY MOUTH TWICE A DAY, Disp: 60  tablet, Rfl: 5  •  glimepiride (AMARYL) 2 MG tablet, TAKE ONE TABLET BY MOUTH TWICE A DAY, Disp: 180 tablet, Rfl: 1  •  Januvia 50 MG tablet, TAKE ONE TABLET BY MOUTH DAILY, Disp: 90 tablet, Rfl: 3  •  losartan (COZAAR) 50 MG tablet, TAKE ONE TABLET BY MOUTH TWICE A DAY, Disp: 270 tablet, Rfl: 3  •  metFORMIN (GLUCOPHAGE) 850 MG tablet, TAKE ONE TABLET BY MOUTH TWICE A DAY, Disp: 180 tablet, Rfl: 3  •  montelukast (SINGULAIR) 10 MG tablet, , Disp: , Rfl:   •  multivitamin with minerals tablet tablet, Take 1 tablet by mouth Daily., Disp: , Rfl:   •  nitroglycerin (NITROSTAT) 0.4 MG SL tablet, 1 under the tongue as needed for angina, may repeat q5mins for up three doses, Disp: 100 tablet, Rfl: 11  •  ofloxacin (FLOXIN) 0.3 % otic solution, , Disp: , Rfl:   •  oxybutynin (DITROPAN) 5 MG tablet, TAKE ONE TABLET BY MOUTH TWICE A DAY, Disp: 60 tablet, Rfl: 5  •  pantoprazole (PROTONIX) 40 MG EC tablet, TAKE ONE TABLET BY MOUTH DAILY, Disp: 90 tablet, Rfl: 3  •  PARoxetine (PAXIL) 40 MG tablet, Take 1 tablet by mouth Daily., Disp: 30 tablet, Rfl: 5  •  potassium chloride (MICRO-K) 10 MEQ CR capsule, TAKE ONE CAPSULE BY MOUTH TWICE A DAY WITH FOOD, Disp: 60 capsule, Rfl: 5  •  pravastatin (PRAVACHOL) 80 MG tablet, Take 1 tablet by mouth Daily., Disp: 90 tablet, Rfl: 1  •  Zinc 25 MG tablet, Take 25 mg by mouth., Disp: , Rfl:   •  doxycycline (VIBRAMYCIN) 100 MG capsule, Take 1 capsule by mouth 2 (Two) Times a Day., Disp: 20 capsule, Rfl: 0     Objective   Physical Exam  Constitutional:       General: She is not in acute distress.     Appearance: Normal appearance. She is obese.   HENT:      Right Ear: Hearing normal.      Left Ear: Hearing normal.      Nose: No nasal tenderness or congestion.      Mouth/Throat:      Mouth: Mucous membranes are moist. No oral lesions.   Eyes:      Extraocular Movements: Extraocular movements intact.      Pupils: Pupils are equal, round, and reactive to light.   Neck:      Thyroid: No thyroid  "mass or thyromegaly.   Cardiovascular:      Rate and Rhythm: Normal rate and regular rhythm.      Pulses: Normal pulses.      Heart sounds: Normal heart sounds. No murmur heard.  Pulmonary:      Effort: Pulmonary effort is normal.      Breath sounds: Decreased breath sounds present. No wheezing, rhonchi or rales.   Chest:   Breasts:      Right: No axillary adenopathy.       Abdominal:      General: Bowel sounds are normal. There is no distension.      Palpations: Abdomen is soft.      Tenderness: There is no abdominal tenderness.   Musculoskeletal:      Cervical back: Neck supple.      Right lower leg: No edema.      Left lower leg: No edema.   Lymphadenopathy:      Cervical: No cervical adenopathy.      Upper Body:      Right upper body: No axillary adenopathy.   Skin:     General: Skin is warm and dry.      Findings: No lesion or rash.   Neurological:      General: No focal deficit present.      Mental Status: She is alert and oriented to person, place, and time.      Cranial Nerves: Cranial nerves are intact.   Psychiatric:         Mood and Affect: Affect normal. Mood is not anxious or depressed.         Vital Signs:   /50 (BP Location: Left arm, Patient Position: Sitting, Cuff Size: Adult)   Pulse 71   Temp 98.2 °F (36.8 °C) (Infrared)   Resp 16   Ht 152.4 cm (60\")   Wt 98 kg (216 lb)   SpO2 93% Comment: room air  BMI 42.18 kg/m²        Result Review :     CMP    CMP 9/21/21 10/26/21 4/20/22   Glucose 140 (A) 182 (A) 120 (A)   BUN 32 (A) 22 17   Creatinine 1.32 (A) 1.12 (A) 0.95   eGFR Non  Am 38 (A) 46 (A)    Sodium 137 136 137   Potassium 5.0 5.3 (A) 4.8   Chloride 95 (A) 96 (A) 98   Calcium 10.0 9.3 9.5   Albumin 4.10 4.10 4.20   Total Bilirubin 0.3 0.3 0.3   Alkaline Phosphatase 68 69 72   AST (SGOT) 12 12 17   ALT (SGPT) 14 16 16   (A) Abnormal value            CBC w/diff    CBC w/Diff 6/14/21 10/26/21 4/20/22   WBC 12.87 (A) 12.49 (A) 11.75 (A)   RBC 4.38 4.03 4.17   Hemoglobin 11.8 (A) " 11.0 (A) 10.4 (A)   Hematocrit 37.5 36.0 33.4 (A)   MCV 85.6 89.3 80.1   MCH 26.9 27.3 24.9 (A)   MCHC 31.5 30.6 (A) 31.1 (A)   RDW 14.9 13.9 15.6 (A)   Platelets 360 334 368   Neutrophil Rel % 79.3 (A) 75.7 78.2 (A)   Immature Granulocyte Rel % 0.4 0.4 0.3   Lymphocyte Rel % 10.3 (A) 13.5 (A) 12.7 (A)   Monocyte Rel % 6.8 8.0 6.2   Eosinophil Rel % 2.9 2.2 2.2   Basophil Rel % 0.3 0.2 0.4   (A) Abnormal value            Data reviewed: Radiologic studies Chest CT 7/15/2019, chest x-ray 11/17/2021, pulmonary function test 5/16/2019, and My last office note   Procedures        Assessment and Plan    Diagnoses and all orders for this visit:    1. Chronic bronchitis, unspecified chronic bronchitis type (HCC) (Primary)    2. Dyspnea on exertion    3. Moderate persistent asthma, unspecified whether complicated    4. Essential hypertension    5. Seasonal allergies    6. Chronic respiratory failure with hypoxia (HCC)    7. Chronic cough    8. Hypercholesterolemia    9. Allergic rhinitis, unspecified seasonality, unspecified trigger    10. Morbid obesity with BMI of 40.0-44.9, adult (HCC)    11. Heart failure, unspecified HF chronicity, unspecified heart failure type (HCC)    12. Stage 2 chronic kidney disease    13. Diabetes 1.5, managed as type 2 (HCC)    14.  Continue Breo as prescribed.  Rinse mouth out after each use.  15.  Continue albuterol as needed.  16.  Continue Singulair and Zyrtec.  17.  Encouraged patient to stay as active as possible.  Recommended 30 minutes of daily exercise.  18.  Continue follow-up with primary care for management scheduled.  19.  Continue follow-up with cardiology as scheduled.  20.  Follow-up with me in 3 to 4 months, sooner if needed.        Follow Up   Return in about 4 months (around 9/17/2022) for Recheck with Harleen.  Patient was given instructions and counseling regarding her condition or for health maintenance advice. Please see specific information pulled into the AVS if  appropriate.

## 2022-08-04 ENCOUNTER — TELEPHONE (OUTPATIENT)
Dept: INTERNAL MEDICINE | Facility: CLINIC | Age: 87
End: 2022-08-04

## 2022-08-04 NOTE — TELEPHONE ENCOUNTER
Caller: Devi Diallo    Relationship: Self    Best call back number: [phone]    What medication are you requesting: MEDICATION FOR BLADDER INFECTION - YEAST INFECTION MEDICATION    What are your current symptoms: PAINFUL URINATION AND INCONTINENCE    How long have you been experiencing symptoms: 1 DAY    Have you had these symptoms before:    [x] Yes  [] No    Have you been treated for these symptoms before:   [x] Yes  [] No    If a prescription is needed, what is your preferred pharmacy and phone number: [facility], [place] - [address] AT [facility] [address] - [phone] [facility] [phone] FX     Additional notes:    PATIENT STATES SHE HAS HAD BLADDER INFECTIONS BEFORE AND SHE IS HAVING THE SAME SYMPTOMS NOW. PLEASE CALL AND ADVISE IF SOMETHING IS SENT OR WHAT NEEDS TO BE DONE.

## 2022-08-05 ENCOUNTER — TELEMEDICINE (OUTPATIENT)
Dept: INTERNAL MEDICINE | Facility: CLINIC | Age: 87
End: 2022-08-05

## 2022-08-05 DIAGNOSIS — N30.00 ACUTE CYSTITIS WITHOUT HEMATURIA: Primary | ICD-10-CM

## 2022-08-05 DIAGNOSIS — B37.31 VAGINAL YEAST INFECTION: ICD-10-CM

## 2022-08-05 PROCEDURE — 99213 OFFICE O/P EST LOW 20 MIN: CPT | Performed by: INTERNAL MEDICINE

## 2022-08-05 RX ORDER — NITROFURANTOIN 25; 75 MG/1; MG/1
100 CAPSULE ORAL 2 TIMES DAILY
Qty: 14 CAPSULE | Refills: 0 | Status: SHIPPED | OUTPATIENT
Start: 2022-08-05 | End: 2022-08-29

## 2022-08-05 RX ORDER — FLUCONAZOLE 100 MG/1
100 TABLET ORAL DAILY
Qty: 3 TABLET | Refills: 1 | Status: SHIPPED | OUTPATIENT
Start: 2022-08-05 | End: 2022-08-29

## 2022-08-05 NOTE — PROGRESS NOTES
Patient is here for telehealth visit, consents to visit, patient is being seen by video and audio ---Chief Complaint/ HPI:     Here to discuss and need for med for uti and yeast infection    Urgency and d/c --patient took 1 dose of Pyridium which helped her through the night,    No fever, a little bit of diarrhea this morning,    bp ok yest at home           Objective   Vital Signs 131/51,  93%on ra  There were no vitals filed for this visit.   There is no height or weight on file to calculate BMI.  Review of Systems  , some diarrhea today,, no rash, no nausea or vomiting, no fevers,  Physical Exam alert and o  X 3  No facial rash ,   Result Review :   Lab Results   Component Value Date    PROBNP 77.4 10/26/2021    PROBNP 72.2 06/14/2021    BNP 52 04/16/2019     CMP    CMP 9/21/21 10/26/21 4/20/22   Glucose 140 (A) 182 (A) 120 (A)   BUN 32 (A) 22 17   Creatinine 1.32 (A) 1.12 (A) 0.95   eGFR Non  Am 38 (A) 46 (A)    Sodium 137 136 137   Potassium 5.0 5.3 (A) 4.8   Chloride 95 (A) 96 (A) 98   Calcium 10.0 9.3 9.5   Albumin 4.10 4.10 4.20   Total Bilirubin 0.3 0.3 0.3   Alkaline Phosphatase 68 69 72   AST (SGOT) 12 12 17   ALT (SGPT) 14 16 16   (A) Abnormal value            CBC w/diff    CBC w/Diff 10/26/21 4/20/22   WBC 12.49 (A) 11.75 (A)   RBC 4.03 4.17   Hemoglobin 11.0 (A) 10.4 (A)   Hematocrit 36.0 33.4 (A)   MCV 89.3 80.1   MCH 27.3 24.9 (A)   MCHC 30.6 (A) 31.1 (A)   RDW 13.9 15.6 (A)   Platelets 334 368   Neutrophil Rel % 75.7 78.2 (A)   Immature Granulocyte Rel % 0.4 0.3   Lymphocyte Rel % 13.5 (A) 12.7 (A)   Monocyte Rel % 8.0 6.2   Eosinophil Rel % 2.2 2.2   Basophil Rel % 0.2 0.4   (A) Abnormal value             Lipid Panel    Lipid Panel 9/21/21 4/20/22   Total Cholesterol 147 150   Triglycerides 128 135   HDL Cholesterol 46 47   VLDL Cholesterol 23 24   LDL Cholesterol  78 79   LDL/HDL Ratio 1.64 1.62            Lab Results   Component Value Date    TSH 2.590 11/16/2020    TSH 2.860 11/05/2019     TSH 3.570 08/15/2019      Lab Results   Component Value Date    FREET4 1.2 08/15/2019      A1C Last 3 Results    HGBA1C Last 3 Results 10/26/21 4/20/22   Hemoglobin A1C 6.94 (A) 6.10 (A)   (A) Abnormal value                              Visit Diagnoses:    ICD-10-CM ICD-9-CM   1. Acute cystitis without hematuria  N30.00 595.0   2. Vaginal yeast infection  B37.3 112.1       Assessment and Plan   Diagnoses and all orders for this visit:    1. Acute cystitis without hematuria (Primary)    2. Vaginal yeast infection    Other orders  -     fluconazole (Diflucan) 100 MG tablet; Take 1 tablet by mouth Daily.  Dispense: 3 tablet; Refill: 1  -     nitrofurantoin, macrocrystal-monohydrate, (Macrobid) 100 MG capsule; Take 1 capsule by mouth 2 (Two) Times a Day.  Dispense: 14 capsule; Refill: 0         Uti, will rx with macobid for 7 days and will rx Diflucan due to itching and burning in that area with recurrent history of vaginal yeast infections, I reviewed her previous UTI from November 2021, it was E. coli and it was sensitive to multiple antibiotics including Keflex and Macrodantin,  , Discussed with patient she can use Pyridium she can continue the Macrobid for 7 days and Diflucan will be sent in to take every other day for 3 doses, if patient's symptoms do not improve she will let us know within the next 5 to 7 days          Follow Up   No follow-ups on file.  Patient was given instructions and counseling regarding her condition or for health maintenance advice. Please see specific information pulled into the AVS if appropriate.

## 2022-08-09 ENCOUNTER — TELEPHONE (OUTPATIENT)
Dept: INTERNAL MEDICINE | Facility: CLINIC | Age: 87
End: 2022-08-09

## 2022-08-09 RX ORDER — CEPHALEXIN 500 MG/1
500 CAPSULE ORAL 3 TIMES DAILY
Qty: 21 CAPSULE | Refills: 0 | Status: SHIPPED | OUTPATIENT
Start: 2022-08-09 | End: 2022-08-29

## 2022-08-09 NOTE — TELEPHONE ENCOUNTER
PT called she is breaking out in a rash on both legs from the antibiotic Macrobid. She says it is very itchy as well.

## 2022-08-09 NOTE — TELEPHONE ENCOUNTER
Call patient let her know I sent in Keflex for her to try but she still needs to use some Benadryl and Pepcid for the itching and possible allergic reaction to the previous antibiotic

## 2022-08-09 NOTE — TELEPHONE ENCOUNTER
Call patient, tell her to stop taking the antibiotic and take some Benadryl over-the-counter and Pepcid over-the-counter both twice a day,    Tell her we can try different antibiotic, we could try Keflex if she is okay to try that, it is a very very distant cousin to the penicillin class but it is very rare to have a reaction, let me know, otherwise if she wants to wait we can always repeat her urine with a culture and sensitivity and see what is growing just let me know what she wants to do

## 2022-08-25 ENCOUNTER — LAB (OUTPATIENT)
Dept: LAB | Facility: HOSPITAL | Age: 87
End: 2022-08-25

## 2022-08-25 DIAGNOSIS — I10 ESSENTIAL HYPERTENSION: ICD-10-CM

## 2022-08-25 DIAGNOSIS — E66.01 CLASS 3 SEVERE OBESITY DUE TO EXCESS CALORIES WITH SERIOUS COMORBIDITY AND BODY MASS INDEX (BMI) OF 40.0 TO 44.9 IN ADULT: ICD-10-CM

## 2022-08-25 DIAGNOSIS — N39.41 URGE INCONTINENCE OF URINE: ICD-10-CM

## 2022-08-25 DIAGNOSIS — K21.9 GASTRO-ESOPHAGEAL REFLUX DISEASE WITHOUT ESOPHAGITIS: ICD-10-CM

## 2022-08-25 DIAGNOSIS — M19.90 ARTHRITIS: ICD-10-CM

## 2022-08-25 DIAGNOSIS — E11.9 TYPE 2 DIABETES MELLITUS WITHOUT COMPLICATION, WITHOUT LONG-TERM CURRENT USE OF INSULIN: ICD-10-CM

## 2022-08-25 DIAGNOSIS — I34.0 NONRHEUMATIC MITRAL VALVE REGURGITATION: ICD-10-CM

## 2022-08-25 DIAGNOSIS — I25.10 ATHEROSCLEROSIS OF NATIVE CORONARY ARTERY OF NATIVE HEART WITHOUT ANGINA PECTORIS: ICD-10-CM

## 2022-08-25 DIAGNOSIS — D50.9 IRON DEFICIENCY ANEMIA, UNSPECIFIED IRON DEFICIENCY ANEMIA TYPE: ICD-10-CM

## 2022-08-25 LAB
ALBUMIN SERPL-MCNC: 4.3 G/DL (ref 3.5–5.2)
ALBUMIN/GLOB SERPL: 1.5 G/DL
ALP SERPL-CCNC: 71 U/L (ref 39–117)
ALT SERPL W P-5'-P-CCNC: 12 U/L (ref 1–33)
ANION GAP SERPL CALCULATED.3IONS-SCNC: 10 MMOL/L (ref 5–15)
AST SERPL-CCNC: 13 U/L (ref 1–32)
BASOPHILS # BLD AUTO: 0.05 10*3/MM3 (ref 0–0.2)
BASOPHILS NFR BLD AUTO: 0.4 % (ref 0–1.5)
BILIRUB SERPL-MCNC: 0.4 MG/DL (ref 0–1.2)
BUN SERPL-MCNC: 34 MG/DL (ref 8–23)
BUN/CREAT SERPL: 28.1 (ref 7–25)
CALCIUM SPEC-SCNC: 9.9 MG/DL (ref 8.6–10.5)
CHLORIDE SERPL-SCNC: 96 MMOL/L (ref 98–107)
CO2 SERPL-SCNC: 28 MMOL/L (ref 22–29)
CREAT SERPL-MCNC: 1.21 MG/DL (ref 0.57–1)
DEPRECATED RDW RBC AUTO: 45.7 FL (ref 37–54)
EGFRCR SERPLBLD CKD-EPI 2021: 44.3 ML/MIN/1.73
EOSINOPHIL # BLD AUTO: 0.46 10*3/MM3 (ref 0–0.4)
EOSINOPHIL NFR BLD AUTO: 3.8 % (ref 0.3–6.2)
ERYTHROCYTE [DISTWIDTH] IN BLOOD BY AUTOMATED COUNT: 15.3 % (ref 12.3–15.4)
FERRITIN SERPL-MCNC: 38.6 NG/ML (ref 13–150)
GLOBULIN UR ELPH-MCNC: 2.8 GM/DL
GLUCOSE SERPL-MCNC: 163 MG/DL (ref 65–99)
HCT VFR BLD AUTO: 35.8 % (ref 34–46.6)
HGB BLD-MCNC: 11.5 G/DL (ref 12–15.9)
IMM GRANULOCYTES # BLD AUTO: 0.04 10*3/MM3 (ref 0–0.05)
IMM GRANULOCYTES NFR BLD AUTO: 0.3 % (ref 0–0.5)
IRON 24H UR-MRATE: 39 MCG/DL (ref 37–145)
IRON SATN MFR SERPL: 9 % (ref 20–50)
LYMPHOCYTES # BLD AUTO: 1.71 10*3/MM3 (ref 0.7–3.1)
LYMPHOCYTES NFR BLD AUTO: 14.3 % (ref 19.6–45.3)
MCH RBC QN AUTO: 26.6 PG (ref 26.6–33)
MCHC RBC AUTO-ENTMCNC: 32.1 G/DL (ref 31.5–35.7)
MCV RBC AUTO: 82.9 FL (ref 79–97)
MONOCYTES # BLD AUTO: 0.94 10*3/MM3 (ref 0.1–0.9)
MONOCYTES NFR BLD AUTO: 7.9 % (ref 5–12)
NEUTROPHILS NFR BLD AUTO: 73.3 % (ref 42.7–76)
NEUTROPHILS NFR BLD AUTO: 8.75 10*3/MM3 (ref 1.7–7)
NRBC BLD AUTO-RTO: 0 /100 WBC (ref 0–0.2)
PLATELET # BLD AUTO: 307 10*3/MM3 (ref 140–450)
PMV BLD AUTO: 9.2 FL (ref 6–12)
POTASSIUM SERPL-SCNC: 5 MMOL/L (ref 3.5–5.2)
PROT SERPL-MCNC: 7.1 G/DL (ref 6–8.5)
RBC # BLD AUTO: 4.32 10*6/MM3 (ref 3.77–5.28)
SODIUM SERPL-SCNC: 134 MMOL/L (ref 136–145)
TIBC SERPL-MCNC: 423 MCG/DL (ref 298–536)
TRANSFERRIN SERPL-MCNC: 284 MG/DL (ref 200–360)
WBC NRBC COR # BLD: 11.95 10*3/MM3 (ref 3.4–10.8)

## 2022-08-25 PROCEDURE — 36415 COLL VENOUS BLD VENIPUNCTURE: CPT

## 2022-08-25 PROCEDURE — 84466 ASSAY OF TRANSFERRIN: CPT

## 2022-08-25 PROCEDURE — 85025 COMPLETE CBC W/AUTO DIFF WBC: CPT

## 2022-08-25 PROCEDURE — 82728 ASSAY OF FERRITIN: CPT

## 2022-08-25 PROCEDURE — 83540 ASSAY OF IRON: CPT

## 2022-08-25 PROCEDURE — 80053 COMPREHEN METABOLIC PANEL: CPT

## 2022-08-29 ENCOUNTER — OFFICE VISIT (OUTPATIENT)
Dept: INTERNAL MEDICINE | Facility: CLINIC | Age: 87
End: 2022-08-29

## 2022-08-29 VITALS
WEIGHT: 213.6 LBS | BODY MASS INDEX: 41.94 KG/M2 | OXYGEN SATURATION: 91 % | TEMPERATURE: 97.5 F | DIASTOLIC BLOOD PRESSURE: 54 MMHG | SYSTOLIC BLOOD PRESSURE: 130 MMHG | HEART RATE: 74 BPM | HEIGHT: 60 IN

## 2022-08-29 DIAGNOSIS — K21.9 GASTRO-ESOPHAGEAL REFLUX DISEASE WITHOUT ESOPHAGITIS: ICD-10-CM

## 2022-08-29 DIAGNOSIS — E11.9 TYPE 2 DIABETES MELLITUS WITHOUT COMPLICATION, WITHOUT LONG-TERM CURRENT USE OF INSULIN: Primary | ICD-10-CM

## 2022-08-29 DIAGNOSIS — N18.2 STAGE 2 CHRONIC KIDNEY DISEASE: ICD-10-CM

## 2022-08-29 DIAGNOSIS — I34.0 NONRHEUMATIC MITRAL VALVE REGURGITATION: ICD-10-CM

## 2022-08-29 DIAGNOSIS — I25.10 ATHEROSCLEROSIS OF NATIVE CORONARY ARTERY OF NATIVE HEART WITHOUT ANGINA PECTORIS: ICD-10-CM

## 2022-08-29 DIAGNOSIS — D50.9 IRON DEFICIENCY ANEMIA, UNSPECIFIED IRON DEFICIENCY ANEMIA TYPE: ICD-10-CM

## 2022-08-29 DIAGNOSIS — I10 ESSENTIAL HYPERTENSION: ICD-10-CM

## 2022-08-29 DIAGNOSIS — J30.2 SEASONAL ALLERGIC RHINITIS, UNSPECIFIED TRIGGER: ICD-10-CM

## 2022-08-29 DIAGNOSIS — E66.01 CLASS 3 SEVERE OBESITY DUE TO EXCESS CALORIES WITH SERIOUS COMORBIDITY AND BODY MASS INDEX (BMI) OF 40.0 TO 44.9 IN ADULT: ICD-10-CM

## 2022-08-29 PROCEDURE — 99214 OFFICE O/P EST MOD 30 MIN: CPT | Performed by: INTERNAL MEDICINE

## 2022-08-29 NOTE — PROGRESS NOTES
"Chief Complaint/ HPI: No recent falls, here to review labs, here with her daughter,    F/u with dm and htn   Hearing loss       Objective   Vital Signs  Vitals:    08/29/22 1346   BP: 130/54   Pulse: 74   Temp: 97.5 °F (36.4 °C)   SpO2: 91%   Weight: 96.9 kg (213 lb 9.6 oz)   Height: 152.4 cm (60\")      Body mass index is 41.72 kg/m².  Review of Systems   Constitutional: Negative.    HENT: Negative.    Eyes: Negative.    Respiratory: Negative.    Cardiovascular: Negative.    Gastrointestinal: Negative.    Endocrine: Negative.    Genitourinary: Negative.    Musculoskeletal: Negative.    Allergic/Immunologic: Negative.    Neurological: Negative.    Hematological: Negative.    Psychiatric/Behavioral: Negative.       Physical Exam  Constitutional:       General: She is not in acute distress.     Appearance: Normal appearance. She is obese.   HENT:      Head: Normocephalic.      Mouth/Throat:      Mouth: Mucous membranes are moist.   Eyes:      Conjunctiva/sclera: Conjunctivae normal.      Pupils: Pupils are equal, round, and reactive to light.   Cardiovascular:      Rate and Rhythm: Normal rate and regular rhythm.      Pulses: Normal pulses.      Heart sounds: Normal heart sounds.   Pulmonary:      Effort: Pulmonary effort is normal.      Breath sounds: Normal breath sounds.   Abdominal:      General: Bowel sounds are normal.      Palpations: Abdomen is soft.   Musculoskeletal:         General: No swelling. Normal range of motion.      Cervical back: Neck supple.   Skin:     General: Skin is warm and dry.      Coloration: Skin is not jaundiced.   Neurological:      General: No focal deficit present.      Mental Status: She is alert and oriented to person, place, and time. Mental status is at baseline.   Psychiatric:         Mood and Affect: Mood normal.         Behavior: Behavior normal.         Thought Content: Thought content normal.         Judgment: Judgment normal.        Result Review :   Lab Results   Component " Value Date    PROBNP 77.4 10/26/2021    PROBNP 72.2 06/14/2021    BNP 52 04/16/2019     CMP    CMP 10/26/21 4/20/22 8/25/22   Glucose 182 (A) 120 (A) 163 (A)   BUN 22 17 34 (A)   Creatinine 1.12 (A) 0.95 1.21 (A)   eGFR Non African Am 46 (A)     Sodium 136 137 134 (A)   Potassium 5.3 (A) 4.8 5.0   Chloride 96 (A) 98 96 (A)   Calcium 9.3 9.5 9.9   Albumin 4.10 4.20 4.30   Total Bilirubin 0.3 0.3 0.4   Alkaline Phosphatase 69 72 71   AST (SGOT) 12 17 13   ALT (SGPT) 16 16 12   (A) Abnormal value            CBC w/diff    CBC w/Diff 10/26/21 4/20/22 8/25/22   WBC 12.49 (A) 11.75 (A) 11.95 (A)   RBC 4.03 4.17 4.32   Hemoglobin 11.0 (A) 10.4 (A) 11.5 (A)   Hematocrit 36.0 33.4 (A) 35.8   MCV 89.3 80.1 82.9   MCH 27.3 24.9 (A) 26.6   MCHC 30.6 (A) 31.1 (A) 32.1   RDW 13.9 15.6 (A) 15.3   Platelets 334 368 307   Neutrophil Rel % 75.7 78.2 (A) 73.3   Immature Granulocyte Rel % 0.4 0.3 0.3   Lymphocyte Rel % 13.5 (A) 12.7 (A) 14.3 (A)   Monocyte Rel % 8.0 6.2 7.9   Eosinophil Rel % 2.2 2.2 3.8   Basophil Rel % 0.2 0.4 0.4   (A) Abnormal value             Lipid Panel    Lipid Panel 9/21/21 4/20/22   Total Cholesterol 147 150   Triglycerides 128 135   HDL Cholesterol 46 47   VLDL Cholesterol 23 24   LDL Cholesterol  78 79   LDL/HDL Ratio 1.64 1.62            Lab Results   Component Value Date    TSH 2.590 11/16/2020    TSH 2.860 11/05/2019    TSH 3.570 08/15/2019      Lab Results   Component Value Date    FREET4 1.2 08/15/2019      A1C Last 3 Results    HGBA1C Last 3 Results 10/26/21 4/20/22   Hemoglobin A1C 6.94 (A) 6.10 (A)   (A) Abnormal value                              Visit Diagnoses:    ICD-10-CM ICD-9-CM   1. Type 2 diabetes mellitus without complication, without long-term current use of insulin (HCC)  E11.9 250.00   2. Stage 2 chronic kidney disease  N18.2 585.2   3. Atherosclerosis of native coronary artery of native heart without angina pectoris  I25.10 414.01   4. Gastro-esophageal reflux disease without  esophagitis  K21.9 530.81   5. Essential hypertension  I10 401.9   6. Iron deficiency anemia, unspecified iron deficiency anemia type  D50.9 280.9   7. Class 3 severe obesity due to excess calories with serious comorbidity and body mass index (BMI) of 40.0 to 44.9 in adult ([facility])  E66.01 278.01    Z68.41 V85.41   8. Nonrheumatic mitral valve regurgitation  I34.0 424.0   9. Seasonal allergic rhinitis, unspecified trigger  J30.2 477.9       Assessment and Plan   Diagnoses and all orders for this visit:    1. Type 2 diabetes mellitus without complication, without long-term current use of insulin ([facility]) (Primary)  -     CBC & Differential; Future  -     Comprehensive Metabolic Panel; Future  -     Hemoglobin A1c; Future  -     Lipid Panel; Future  -     Lipid Panel; Future    2. Stage 2 chronic kidney disease  -     CBC & Differential; Future  -     Comprehensive Metabolic Panel; Future  -     Hemoglobin A1c; Future  -     Lipid Panel; Future  -     Lipid Panel; Future    3. Atherosclerosis of native coronary artery of native heart without angina pectoris  -     CBC & Differential; Future  -     Comprehensive Metabolic Panel; Future  -     Hemoglobin A1c; Future  -     Lipid Panel; Future  -     Lipid Panel; Future    4. Gastro-esophageal reflux disease without esophagitis  -     CBC & Differential; Future  -     Comprehensive Metabolic Panel; Future  -     Hemoglobin A1c; Future  -     Lipid Panel; Future  -     Lipid Panel; Future    5. Essential hypertension  -     CBC & Differential; Future  -     Comprehensive Metabolic Panel; Future  -     Hemoglobin A1c; Future  -     Lipid Panel; Future  -     Lipid Panel; Future    6. Iron deficiency anemia, unspecified iron deficiency anemia type  -     CBC & Differential; Future  -     Comprehensive Metabolic Panel; Future  -     Hemoglobin A1c; Future  -     Lipid Panel; Future  -     Lipid Panel; Future    7. Class 3 severe obesity due to excess calories with serious  comorbidity and body mass index (BMI) of 40.0 to 44.9 in adult (HCC)  -     CBC & Differential; Future  -     Comprehensive Metabolic Panel; Future  -     Hemoglobin A1c; Future  -     Lipid Panel; Future  -     Lipid Panel; Future    8. Nonrheumatic mitral valve regurgitation  -     CBC & Differential; Future  -     Comprehensive Metabolic Panel; Future  -     Hemoglobin A1c; Future  -     Lipid Panel; Future  -     Lipid Panel; Future    9. Seasonal allergic rhinitis, unspecified trigger  -     CBC & Differential; Future  -     Comprehensive Metabolic Panel; Future  -     Hemoglobin A1c; Future  -     Lipid Panel; Future  -     Lipid Panel; Future        lower extremity edema --BR NP level low at 77, clinically stable --- continue Lasix 20 mg twice daily , potassium 10 mEq twice daily    Hyperkalemia mild recommend decrease potassium to 1 a day August 29, 2022    respiratory infection cough wheezing asthma exacerbations ----CT scan of the chest, Chest x-ray shows questionable infiltrate versus fullness in the right hilum right lung field,--- SEEING DR BANKS --- continues CARLY CHIU, NOV 2019, ---PULM NOD. ON CT APRIL 2019, REPEATED CT ABD/PELVIS AND CHEST ---JUNE 2019,      AND THEN MRI OF ABD FOR RENAL MASS----- JULY, 2019,     ELEVATED WBC CHRONIC , STABLE - NOV 2020, through April 2022,--DID SEE DR BUCIO, NO RX     ALLERGRIES , CONT ZYRTEC AND SINGULAIR    HTN -- cont NORVASC 5 mg daily , Coreg 18.375 twice a day, losartan 50 bid,    IRON DEF ANEMIA, continues OTC iron supplements and vitamin C---- does not want to do colonoscopy endoscopy at this time, we will follow-up blood counts again, still mildly iron deficient August 29, 2022    DM , continue AMARYL 1 mg qam , Januvia 50 mg daily, metformin 850 mg twice a day,----hemoglobin A1c up slightly at 6.1,, much improved April 2022,      OBESITY, discussed October 2021    DEPRESSION-   continue on Paxil 40 mg daily    ELEVATED CHOL-continues PRAVASTATIN  80 MG QHS     PARESH,-RESOLVED-patient in hospital-- 3/2014 with acute renal failure, anuric, required dialysis for several days, resolved, creatinine up slightly August 29, 2022 encourage fluid intake,    Urge inCONTINENCE-continues OXYBUTYNIN DISCUSSED--USING ONE DAILY, estrogen cream daily or twice weekly discussed with patient about stopping oxybutynin again August 29, 2022 due to dry mouth, side effect potential,     NEUROPATHY, RX OPTIONS DISCUSSED --B VITAMINS REC     EYE CHECKED --XIOMARA MCKNIGHT, JAN 2021     DOES JUAN LUIS NICHOLAS IN [place] --OCT 2017, HAD DIAGN. MAMMO AND DID BX , BENIGN--LMAMMO NOV 2019, [place] ,--OK TO STOP MAY 2020,                  Follow Up   Return in about 6 months (around 2/28/2023).  Patient was given instructions and counseling regarding her condition or for health maintenance advice. Please see specific information pulled into the AVS if appropriate.

## 2022-09-12 RX ORDER — FUROSEMIDE 20 MG/1
TABLET ORAL
Qty: 60 TABLET | Refills: 5 | Status: SHIPPED | OUTPATIENT
Start: 2022-09-12

## 2022-09-15 ENCOUNTER — OFFICE VISIT (OUTPATIENT)
Dept: CARDIOLOGY | Facility: CLINIC | Age: 87
End: 2022-09-15

## 2022-09-15 VITALS
DIASTOLIC BLOOD PRESSURE: 54 MMHG | WEIGHT: 218 LBS | HEART RATE: 70 BPM | BODY MASS INDEX: 42.8 KG/M2 | HEIGHT: 60 IN | SYSTOLIC BLOOD PRESSURE: 136 MMHG

## 2022-09-15 DIAGNOSIS — I34.0 NONRHEUMATIC MITRAL VALVE REGURGITATION: ICD-10-CM

## 2022-09-15 DIAGNOSIS — E66.01 CLASS 3 SEVERE OBESITY DUE TO EXCESS CALORIES WITH SERIOUS COMORBIDITY AND BODY MASS INDEX (BMI) OF 40.0 TO 44.9 IN ADULT: ICD-10-CM

## 2022-09-15 DIAGNOSIS — I25.10 ATHEROSCLEROSIS OF NATIVE CORONARY ARTERY OF NATIVE HEART WITHOUT ANGINA PECTORIS: Primary | Chronic | ICD-10-CM

## 2022-09-15 DIAGNOSIS — E78.00 HYPERCHOLESTEROLEMIA: ICD-10-CM

## 2022-09-15 DIAGNOSIS — I10 ESSENTIAL HYPERTENSION: Chronic | ICD-10-CM

## 2022-09-15 PROBLEM — N18.32 STAGE 3B CHRONIC KIDNEY DISEASE (HCC): Status: ACTIVE | Noted: 2022-09-15

## 2022-09-15 PROCEDURE — 99214 OFFICE O/P EST MOD 30 MIN: CPT | Performed by: INTERNAL MEDICINE

## 2022-09-15 NOTE — ASSESSMENT & PLAN NOTE
She has moderate mitral regurgitation with mild elevation in pulmonary artery systolic pressure.  She will continue the diuretics and the current dosage.

## 2022-09-15 NOTE — ASSESSMENT & PLAN NOTE
Her lipids were not at goal and we added ezetimibe.  She will get a lipid panel in the next 1 week for us to decide if she is adequately controlled or not

## 2022-09-15 NOTE — ASSESSMENT & PLAN NOTE
Her coronary artery disease is stable without any symptoms of angina.  She will continue the carvedilol 12.5 mg 1-1/2 tablet twice a day.  She will also continue the amlodipine and aspirin and lipid-lowering therapy

## 2022-09-15 NOTE — ASSESSMENT & PLAN NOTE
Her chronic kidney disease has got a little worse.  We will repeat her chemistry panel when she gets her lipid panel tested.

## 2022-09-15 NOTE — PROGRESS NOTES
Office Visit    Chief Complaint  Hypertension and Coronary Artery Disease    Subjective            Devi Diallo presents to [facility] CARDIOLOGY  History of Present Illness  Ms. Diallo is an 86 years old female with hypertension nonobstructive coronary disease hyperlipidemia diabetes morbid obesity was done reasonably well.  She has mild pedal edema persistent.  She denies chest pain palpitation dizziness or syncope      Past Medical History:   Diagnosis Date   • Arthritis    • Atherosclerotic heart disease of native coronary artery without angina pectoris 1/1/2002    cardiac catheterization 2002   • Diabetes (HCC)    • Essential hypertension 9/20/2012   • Hyperlipemia    • Hypertension    • Pure hypercholesterolemia 4/25/2014   • Reflux esophagitis    • Seasonal allergies    • Stage 2 chronic kidney disease 9/20/2012       Allergies   Allergen Reactions   • Amoxicillin-Pot Clavulanate Hives and Unknown - Low Severity   • Cipro [Ciprofloxacin Hcl] Hives   • Ciprofloxacin Unknown - Low Severity   • Clindamycin Unknown - Low Severity   • Macrobid [Nitrofurantoin] Hives   • Penicillins Hives   • Sulfamethoxazole-Trimethoprim Nausea Only and Unknown - Low Severity   • Clindamycin/Lincomycin Rash        Past Surgical History:   Procedure Laterality Date   • COLONOSCOPY     • EYE SURGERY      Eye Implant   • HYSTERECTOMY          Social History     Tobacco Use   • Smoking status: Never Smoker   • Smokeless tobacco: Never Used   Vaping Use   • Vaping Use: Never used   Substance Use Topics   • Alcohol use: Never   • Drug use: Never       Family History   Problem Relation Age of Onset   • Cancer Mother         Unspecified   • Arthritis Mother    • Diabetes Brother         Unspecified type   • Arthritis Brother         Prior to Admission medications    Medication Sig Start Date End Date Taking? Authorizing Provider   albuterol sulfate  (90 Base) MCG/ACT inhaler Inhale 2 puffs Every 4 (Four)  Hours As Needed for Wheezing.   Yes Meli Devlin MD   amLODIPine (NORVASC) 5 MG tablet TAKE ONE TABLET BY MOUTH DAILY 3/2/22  Yes Jacobo Redmond MD   Ascorbic Acid (Vitamin C) 500 MG capsule Take 500 mg by mouth.   Yes Meli Devlin MD   Aspirin 81 MG capsule Daily.   Yes Meli Devlin MD   Brebetzaida Ellipta 200-25 MCG/INH inhaler Inhale 1 puff Daily. 2/17/22  Yes Harleen Dasilva APRN   carvedilol (COREG) 12.5 MG tablet TAKE ONE AND A HALF TABLET BY MOUTH TWO TIMES A DAY 2/14/22  Yes Mario Eaton MD   cetirizine (zyrTEC) 10 MG tablet Take 10 mg by mouth Daily.   Yes Meli Devlin MD   cholecalciferol (VITAMIN D3) 25 MCG (1000 UT) tablet Take 1,000 Units by mouth Daily.   Yes Meli Devlin MD   clobetasol (TEMOVATE) 0.05 % cream Apply 1 application topically to the appropriate area as directed 2 (Two) Times a Day. 10/27/21  Yes Jacobo Redmond MD   Cranberry 1000 MG capsule Take 1,000 mg by mouth.   Yes Meli Devlin MD   estradiol (ESTRACE) 0.1 MG/GM vaginal cream Insert 2 g into the vagina Daily. 10/27/21  Yes Jacobo Redmond MD   ezetimibe (ZETIA) 10 MG tablet Take 1 tablet by mouth Daily. 5/4/22  Yes Mario Eaton MD   ferrous sulfate 325 (65 FE) MG tablet Take 1 tablet by mouth Daily With Breakfast. 4/25/22  Yes Jacobo Redmond MD   furosemide (LASIX) 20 MG tablet TAKE ONE TABLET BY MOUTH TWICE A DAY 9/12/22  Yes Jacobo Redmond MD   glimepiride (AMARYL) 2 MG tablet TAKE ONE TABLET BY MOUTH TWICE A DAY 10/1/21  Yes Jacobo Redmond MD   Januvia 50 MG tablet TAKE ONE TABLET BY MOUTH DAILY 3/2/22  Yes Jacobo Redmond MD   losartan (COZAAR) 50 MG tablet TAKE ONE TABLET BY MOUTH TWICE A DAY 10/1/21  Yes Mario Eaton MD   metFORMIN (GLUCOPHAGE) 850 MG tablet TAKE ONE TABLET BY MOUTH TWICE A DAY 3/2/22  Yes Jacobo Redmond MD   montelukast (SINGULAIR) 10 MG tablet  9/16/21  Yes Provider, MD Meli  "  multivitamin with minerals tablet tablet Take 1 tablet by mouth Daily.   Yes ProviderMeli MD   nitroglycerin (NITROSTAT) 0.4 MG SL tablet 1 under the tongue as needed for angina, may repeat q5mins for up three doses 3/15/22  Yes iRssa Wang APRN   pantoprazole (PROTONIX) 40 MG EC tablet TAKE ONE TABLET BY MOUTH DAILY 3/2/22  Yes Jacobo Redmond MD   PARoxetine (PAXIL) 40 MG tablet Take 1 tablet by mouth Daily. 9/22/21  Yes Rissa Wang APRN   potassium chloride (MICRO-K) 10 MEQ CR capsule TAKE ONE CAPSULE BY MOUTH TWICE A DAY WITH FOOD 12/20/21  Yes Jacobo Redmond MD   pravastatin (PRAVACHOL) 80 MG tablet Take 1 tablet by mouth Daily. 9/22/21  Yes Rissa Wang APRN   Zinc 25 MG tablet Take 25 mg by mouth.   Yes ProviderMeli MD   oxybutynin (DITROPAN) 5 MG tablet TAKE ONE TABLET BY MOUTH TWICE A DAY 3/14/22   Jacobo Redmond MD        Review of Systems   Constitutional: Negative for fatigue.   Respiratory: Positive for shortness of breath. Negative for cough.    Cardiovascular: Positive for leg swelling. Negative for chest pain and palpitations.   Neurological: Negative for dizziness.        Objective     /54   Pulse 70   Ht 152.4 cm (60\")   Wt 98.9 kg (218 lb)   BMI 42.58 kg/m²       Physical Exam  Constitutional:       General: She is awake.      Appearance: Normal appearance.   Neck:      Thyroid: No thyromegaly.      Vascular: No carotid bruit or JVD.   Cardiovascular:      Rate and Rhythm: Normal rate and regular rhythm.      Chest Wall: PMI is not displaced.      Pulses: Normal pulses.      Heart sounds: Normal heart sounds, S1 normal and S2 normal. No murmur heard.    No friction rub. No gallop. No S3 or S4 sounds.   Pulmonary:      Effort: Pulmonary effort is normal.      Breath sounds: Normal breath sounds and air entry. No wheezing, rhonchi or rales.   Abdominal:      General: Bowel sounds are normal.      Palpations: Abdomen is soft. " There is no mass.      Tenderness: There is no abdominal tenderness.   Musculoskeletal:      Cervical back: Neck supple.      Right lower leg: Edema present.      Left lower leg: Edema present.   Neurological:      Mental Status: She is alert and oriented to person, place, and time.   Psychiatric:         Mood and Affect: Mood normal.         Behavior: Behavior is cooperative.           Result Review :                      Lab Results   Component Value Date    PROBNP 77.4 10/26/2021    PROBNP 72.2 06/14/2021    BNP 52 04/16/2019     CMP    CMP 10/26/21 4/20/22 8/25/22   Glucose 182 (A) 120 (A) 163 (A)   BUN 22 17 34 (A)   Creatinine 1.12 (A) 0.95 1.21 (A)   eGFR Non African Am 46 (A)     Sodium 136 137 134 (A)   Potassium 5.3 (A) 4.8 5.0   Chloride 96 (A) 98 96 (A)   Calcium 9.3 9.5 9.9   Albumin 4.10 4.20 4.30   Total Bilirubin 0.3 0.3 0.4   Alkaline Phosphatase 69 72 71   AST (SGOT) 12 17 13   ALT (SGPT) 16 16 12   (A) Abnormal value            CBC w/diff    CBC w/Diff 10/26/21 4/20/22 8/25/22   WBC 12.49 (A) 11.75 (A) 11.95 (A)   RBC 4.03 4.17 4.32   Hemoglobin 11.0 (A) 10.4 (A) 11.5 (A)   Hematocrit 36.0 33.4 (A) 35.8   MCV 89.3 80.1 82.9   MCH 27.3 24.9 (A) 26.6   MCHC 30.6 (A) 31.1 (A) 32.1   RDW 13.9 15.6 (A) 15.3   Platelets 334 368 307   Neutrophil Rel % 75.7 78.2 (A) 73.3   Immature Granulocyte Rel % 0.4 0.3 0.3   Lymphocyte Rel % 13.5 (A) 12.7 (A) 14.3 (A)   Monocyte Rel % 8.0 6.2 7.9   Eosinophil Rel % 2.2 2.2 3.8   Basophil Rel % 0.2 0.4 0.4   (A) Abnormal value             Lipid Panel    Lipid Panel 9/21/21 4/20/22   Total Cholesterol 147 150   Triglycerides 128 135   HDL Cholesterol 46 47   VLDL Cholesterol 23 24   LDL Cholesterol  78 79   LDL/HDL Ratio 1.64 1.62            Lab Results   Component Value Date    TSH 2.590 11/16/2020    TSH 2.860 11/05/2019    TSH 3.570 08/15/2019      Lab Results   Component Value Date    FREET4 1.2 08/15/2019      No results found for: DDIMERQUANT  Magnesium   Date  Value Ref Range Status   04/20/2022 1.7 1.6 - 2.4 mg/dL Final      No results found for: DIGOXIN   A1C Last 3 Results    HGBA1C Last 3 Results 10/26/21 4/20/22   Hemoglobin A1C 6.94 (A) 6.10 (A)   (A) Abnormal value                      Assessment and Plan        Diagnoses and all orders for this visit:    1. Atherosclerosis of native coronary artery of native heart without angina pectoris (Primary)  Assessment & Plan:  Her coronary artery disease is stable without any symptoms of angina.  She will continue the carvedilol 12.5 mg 1-1/2 tablet twice a day.  She will also continue the amlodipine and aspirin and lipid-lowering therapy    Orders:  -     Lipid Panel; Future  -     Comprehensive Metabolic Panel; Future  -     Magnesium; Future    2. Class 3 severe obesity due to excess calories with serious comorbidity and body mass index (BMI) of 40.0 to 44.9 in adult (HCC)  Assessment & Plan:  She has become more sedentary over the last 1 year and has not lost any weight.  I encouraged her to walk 5 to 10 minutes 3 times a day and to reduce the carbohydrate content in    .    Orders:  -     Lipid Panel; Future  -     Comprehensive Metabolic Panel; Future  -     Magnesium; Future    3. Nonrheumatic mitral valve regurgitation  Assessment & Plan:  She has moderate mitral regurgitation with mild elevation in pulmonary artery systolic pressure.  She will continue the diuretics and the current dosage.    Orders:  -     Lipid Panel; Future  -     Comprehensive Metabolic Panel; Future  -     Magnesium; Future    4. Hypercholesterolemia  Assessment & Plan:  Her lipids were not at goal and we added ezetimibe.  She will get a lipid panel in the next 1 week for us to decide if she is adequately controlled or not    Orders:  -     Lipid Panel; Future  -     Comprehensive Metabolic Panel; Future  -     Magnesium; Future    5. Essential hypertension  Assessment & Plan:  Hypertension is controlled on losartan and amlodipine carvedilol  and diuretics.  She will continue the same.            Follow Up     Return in about 9 months (around 6/15/2023) for with Dr Romero.    Patient was given instructions and counseling regarding her condition or for health maintenance advice. Please see specific information pulled into the AVS if appropriate.     Mario Eaton MD  09/15/22 10:34 EDT

## 2022-09-19 ENCOUNTER — LAB (OUTPATIENT)
Dept: LAB | Facility: HOSPITAL | Age: 87
End: 2022-09-19

## 2022-09-19 DIAGNOSIS — N18.2 STAGE 2 CHRONIC KIDNEY DISEASE: ICD-10-CM

## 2022-09-19 DIAGNOSIS — I34.0 NONRHEUMATIC MITRAL VALVE REGURGITATION: ICD-10-CM

## 2022-09-19 DIAGNOSIS — E11.9 TYPE 2 DIABETES MELLITUS WITHOUT COMPLICATION, WITHOUT LONG-TERM CURRENT USE OF INSULIN: ICD-10-CM

## 2022-09-19 DIAGNOSIS — E66.01 CLASS 3 SEVERE OBESITY DUE TO EXCESS CALORIES WITH SERIOUS COMORBIDITY AND BODY MASS INDEX (BMI) OF 40.0 TO 44.9 IN ADULT: ICD-10-CM

## 2022-09-19 DIAGNOSIS — E78.00 HYPERCHOLESTEROLEMIA: ICD-10-CM

## 2022-09-19 DIAGNOSIS — K21.9 GASTRO-ESOPHAGEAL REFLUX DISEASE WITHOUT ESOPHAGITIS: ICD-10-CM

## 2022-09-19 DIAGNOSIS — D50.9 IRON DEFICIENCY ANEMIA, UNSPECIFIED IRON DEFICIENCY ANEMIA TYPE: ICD-10-CM

## 2022-09-19 DIAGNOSIS — I10 ESSENTIAL HYPERTENSION: ICD-10-CM

## 2022-09-19 DIAGNOSIS — J30.2 SEASONAL ALLERGIC RHINITIS, UNSPECIFIED TRIGGER: ICD-10-CM

## 2022-09-19 DIAGNOSIS — I25.10 ATHEROSCLEROSIS OF NATIVE CORONARY ARTERY OF NATIVE HEART WITHOUT ANGINA PECTORIS: Chronic | ICD-10-CM

## 2022-09-19 LAB
ALBUMIN SERPL-MCNC: 4.2 G/DL (ref 3.5–5.2)
ALBUMIN/GLOB SERPL: 1.6 G/DL
ALP SERPL-CCNC: 71 U/L (ref 39–117)
ALT SERPL W P-5'-P-CCNC: 12 U/L (ref 1–33)
ANION GAP SERPL CALCULATED.3IONS-SCNC: 9 MMOL/L (ref 5–15)
AST SERPL-CCNC: 12 U/L (ref 1–32)
BILIRUB SERPL-MCNC: 0.3 MG/DL (ref 0–1.2)
BUN SERPL-MCNC: 35 MG/DL (ref 8–23)
BUN/CREAT SERPL: 28.2 (ref 7–25)
CALCIUM SPEC-SCNC: 9.8 MG/DL (ref 8.6–10.5)
CHLORIDE SERPL-SCNC: 98 MMOL/L (ref 98–107)
CHOLEST SERPL-MCNC: 110 MG/DL (ref 0–200)
CO2 SERPL-SCNC: 27 MMOL/L (ref 22–29)
CREAT SERPL-MCNC: 1.24 MG/DL (ref 0.57–1)
EGFRCR SERPLBLD CKD-EPI 2021: 43 ML/MIN/1.73
GLOBULIN UR ELPH-MCNC: 2.7 GM/DL
GLUCOSE SERPL-MCNC: 143 MG/DL (ref 65–99)
HDLC SERPL-MCNC: 42 MG/DL (ref 40–60)
LDLC SERPL CALC-MCNC: 46 MG/DL (ref 0–100)
LDLC/HDLC SERPL: 1.03 {RATIO}
MAGNESIUM SERPL-MCNC: 2 MG/DL (ref 1.6–2.4)
POTASSIUM SERPL-SCNC: 5.2 MMOL/L (ref 3.5–5.2)
PROT SERPL-MCNC: 6.9 G/DL (ref 6–8.5)
SODIUM SERPL-SCNC: 134 MMOL/L (ref 136–145)
TRIGL SERPL-MCNC: 124 MG/DL (ref 0–150)
VLDLC SERPL-MCNC: 22 MG/DL (ref 5–40)

## 2022-09-19 PROCEDURE — 36415 COLL VENOUS BLD VENIPUNCTURE: CPT

## 2022-09-19 PROCEDURE — 80061 LIPID PANEL: CPT

## 2022-09-19 PROCEDURE — 83735 ASSAY OF MAGNESIUM: CPT

## 2022-09-19 PROCEDURE — 80053 COMPREHEN METABOLIC PANEL: CPT

## 2022-09-21 ENCOUNTER — OFFICE VISIT (OUTPATIENT)
Dept: PULMONOLOGY | Facility: CLINIC | Age: 87
End: 2022-09-21

## 2022-09-21 VITALS
WEIGHT: 219.7 LBS | HEART RATE: 84 BPM | DIASTOLIC BLOOD PRESSURE: 58 MMHG | HEIGHT: 60 IN | RESPIRATION RATE: 20 BRPM | SYSTOLIC BLOOD PRESSURE: 153 MMHG | BODY MASS INDEX: 43.13 KG/M2 | OXYGEN SATURATION: 100 % | TEMPERATURE: 98.2 F

## 2022-09-21 DIAGNOSIS — E66.01 MORBID OBESITY WITH BMI OF 40.0-44.9, ADULT: ICD-10-CM

## 2022-09-21 DIAGNOSIS — J45.40 MODERATE PERSISTENT ASTHMA, UNSPECIFIED WHETHER COMPLICATED: ICD-10-CM

## 2022-09-21 DIAGNOSIS — J30.9 ALLERGIC RHINITIS, UNSPECIFIED SEASONALITY, UNSPECIFIED TRIGGER: ICD-10-CM

## 2022-09-21 DIAGNOSIS — R05.3 CHRONIC COUGH: ICD-10-CM

## 2022-09-21 DIAGNOSIS — J30.2 SEASONAL ALLERGIES: ICD-10-CM

## 2022-09-21 DIAGNOSIS — J42 CHRONIC BRONCHITIS, UNSPECIFIED CHRONIC BRONCHITIS TYPE: Primary | ICD-10-CM

## 2022-09-21 DIAGNOSIS — R06.09 DYSPNEA ON EXERTION: ICD-10-CM

## 2022-09-21 PROCEDURE — 99214 OFFICE O/P EST MOD 30 MIN: CPT | Performed by: NURSE PRACTITIONER

## 2022-09-21 NOTE — PROGRESS NOTES
Primary Care Provider  Jacobo Redmond MD     Referring Provider  No ref. provider found     Chief Complaint  Shortness of Breath and Follow-up (4 mo f/up )    Subjective          Devi Diallo presents to [facility] PULMONARY & CRITICAL CARE MEDICINE  History of Present Illness  Devi Diallo is a 86 y.o. female patient of Dr. Timmons here for management of chronic bronchitis, moderate persistent asthma, cough, seasonal allergies and dyspnea on exertion.    Patient states she is doing well since her last visit.  She denies using antibiotics or steroids for her lungs.  She denies any fevers or chills.  Her shortness of breath is at baseline.  This is mild to moderate in severity, worse with exertion and improved with rest.  Patient states that she will have an occasional productive cough with clear sputum.  She states that she believes it is mostly related to sinus drainage.  She continues to use Breo as prescribed.  She rarely needs to use her albuterol inhaler.  She continues to take Singulair and Zyrtec for allergies.  Overall, she is doing well and has no additional concerns at this time.  She is able to perform her ADLs with the use of a walker.  She is up-to-date with her COVID and pneumonia vaccines.  She will be due for a flu vaccine this fall.     Her history of smoking is   Tobacco Use: Low Risk    • Smoking Tobacco Use: Never Smoker   • Smokeless Tobacco Use: Never Used   .    Review of Systems   Constitutional: Negative for chills, fatigue, fever, unexpected weight gain and unexpected weight loss.   HENT: Negative for congestion (Nasal), hearing loss, mouth sores, nosebleeds, postnasal drip, sore throat and trouble swallowing.    Eyes: Negative for blurred vision and visual disturbance.   Respiratory: Positive for cough and shortness of breath. Negative for apnea and wheezing.         Negative for Hemoptysis     Cardiovascular: Negative for chest pain, palpitations and leg  swelling.   Gastrointestinal: Negative for abdominal pain, constipation, diarrhea, nausea, vomiting and GERD.        Negative for Jaundice  Negative for Bloating  Negative for Melena   Musculoskeletal: Negative for joint swelling and myalgias.        Negative for Joint pain  Negative for Joint stiffness   Skin: Negative for color change.        Negative for cyanosis   Neurological: Negative for syncope, weakness, numbness and headache.      Sleep: Negative for Excessive daytime sleepiness  Negative for morning headaches  Negative for Snoring    Family History   Problem Relation Age of Onset   • Cancer Mother         Unspecified   • Arthritis Mother    • Diabetes Brother         Unspecified type   • Arthritis Brother         Social History     Socioeconomic History   • Marital status:    Tobacco Use   • Smoking status: Never Smoker   • Smokeless tobacco: Never Used   Vaping Use   • Vaping Use: Never used   Substance and Sexual Activity   • Alcohol use: Never   • Drug use: Never   • Sexual activity: Defer        Past Medical History:   Diagnosis Date   • Arthritis    • Atherosclerotic heart disease of native coronary artery without angina pectoris 1/1/2002    cardiac catheterization 2002   • Diabetes (HCC)    • Essential hypertension 9/20/2012   • Hyperlipemia    • Hypertension    • Pure hypercholesterolemia 4/25/2014   • Reflux esophagitis    • Seasonal allergies    • Stage 2 chronic kidney disease 9/20/2012        Immunization History   Administered Date(s) Administered   • COVID-19 (MODERNA) 1st, 2nd, 3rd Dose Only 02/18/2021, 03/19/2021   • Fluzone High Dose =>65 Years ([employer] ONLY) 10/16/2020   • Fluzone High-Dose 65+yrs 10/08/2021   • Pneumococcal, Unspecified 10/20/2019, 10/20/2019         Allergies   Allergen Reactions   • Amoxicillin-Pot Clavulanate Hives and Unknown - Low Severity   • Cipro [Ciprofloxacin Hcl] Hives   • Ciprofloxacin Unknown - Low Severity   • Clindamycin Unknown - Low Severity   •  Macrobid [Nitrofurantoin] Hives   • Penicillins Hives   • Sulfamethoxazole-Trimethoprim Nausea Only and Unknown - Low Severity   • Clindamycin/Lincomycin Rash          Current Outpatient Medications:   •  albuterol sulfate  (90 Base) MCG/ACT inhaler, Inhale 2 puffs Every 4 (Four) Hours As Needed for Wheezing., Disp: , Rfl:   •  amLODIPine (NORVASC) 5 MG tablet, TAKE ONE TABLET BY MOUTH DAILY, Disp: 90 tablet, Rfl: 3  •  Ascorbic Acid (Vitamin C) 500 MG capsule, Take 500 mg by mouth., Disp: , Rfl:   •  Aspirin 81 MG capsule, Daily., Disp: , Rfl:   •  Breo Ellipta 200-25 MCG/INH inhaler, Inhale 1 puff Daily., Disp: 60 each, Rfl: 11  •  carvedilol (COREG) 12.5 MG tablet, TAKE ONE AND A HALF TABLET BY MOUTH TWO TIMES A DAY, Disp: 270 tablet, Rfl: 2  •  cetirizine (zyrTEC) 10 MG tablet, Take 10 mg by mouth Daily., Disp: , Rfl:   •  cholecalciferol (VITAMIN D3) 25 MCG (1000 UT) tablet, Take 1,000 Units by mouth Daily., Disp: , Rfl:   •  clobetasol (TEMOVATE) 0.05 % cream, Apply 1 application topically to the appropriate area as directed 2 (Two) Times a Day., Disp: 45 g, Rfl: 3  •  Cranberry 1000 MG capsule, Take 1,000 mg by mouth., Disp: , Rfl:   •  estradiol (ESTRACE) 0.1 MG/GM vaginal cream, Insert 2 g into the vagina Daily., Disp: 42.5 g, Rfl: 12  •  ezetimibe (ZETIA) 10 MG tablet, Take 1 tablet by mouth Daily., Disp: 90 tablet, Rfl: 3  •  ferrous sulfate 325 (65 FE) MG tablet, Take 1 tablet by mouth Daily With Breakfast., Disp: 90 tablet, Rfl: 3  •  furosemide (LASIX) 20 MG tablet, TAKE ONE TABLET BY MOUTH TWICE A DAY, Disp: 60 tablet, Rfl: 5  •  glimepiride (AMARYL) 2 MG tablet, TAKE ONE TABLET BY MOUTH TWICE A DAY, Disp: 180 tablet, Rfl: 1  •  Januvia 50 MG tablet, TAKE ONE TABLET BY MOUTH DAILY, Disp: 90 tablet, Rfl: 3  •  losartan (COZAAR) 50 MG tablet, TAKE ONE TABLET BY MOUTH TWICE A DAY, Disp: 270 tablet, Rfl: 3  •  metFORMIN (GLUCOPHAGE) 850 MG tablet, TAKE ONE TABLET BY MOUTH TWICE A DAY, Disp: 180  tablet, Rfl: 3  •  montelukast (SINGULAIR) 10 MG tablet, , Disp: , Rfl:   •  multivitamin with minerals tablet tablet, Take 1 tablet by mouth Daily., Disp: , Rfl:   •  nitroglycerin (NITROSTAT) 0.4 MG SL tablet, 1 under the tongue as needed for angina, may repeat q5mins for up three doses, Disp: 100 tablet, Rfl: 11  •  pantoprazole (PROTONIX) 40 MG EC tablet, TAKE ONE TABLET BY MOUTH DAILY, Disp: 90 tablet, Rfl: 3  •  PARoxetine (PAXIL) 40 MG tablet, Take 1 tablet by mouth Daily., Disp: 30 tablet, Rfl: 5  •  potassium chloride (MICRO-K) 10 MEQ CR capsule, TAKE ONE CAPSULE BY MOUTH TWICE A DAY WITH FOOD, Disp: 60 capsule, Rfl: 5  •  pravastatin (PRAVACHOL) 80 MG tablet, Take 1 tablet by mouth Daily., Disp: 90 tablet, Rfl: 1  •  Zinc 25 MG tablet, Take 25 mg by mouth., Disp: , Rfl:      Objective   Physical Exam  Constitutional:       General: She is not in acute distress.     Appearance: Normal appearance. She is obese.   HENT:      Right Ear: Hearing normal.      Left Ear: Hearing normal.      Nose: No nasal tenderness or congestion.      Mouth/Throat:      Mouth: Mucous membranes are moist. No oral lesions.   Eyes:      Extraocular Movements: Extraocular movements intact.      Pupils: Pupils are equal, round, and reactive to light.   Neck:      Thyroid: No thyroid mass or thyromegaly.   Cardiovascular:      Rate and Rhythm: Normal rate and regular rhythm.      Pulses: Normal pulses.      Heart sounds: Normal heart sounds. No murmur heard.  Pulmonary:      Effort: Pulmonary effort is normal.      Breath sounds: Decreased breath sounds present. No wheezing, rhonchi or rales.   Chest:   Breasts:      Right: No axillary adenopathy.       Abdominal:      General: Bowel sounds are normal. There is no distension.      Palpations: Abdomen is soft.      Tenderness: There is no abdominal tenderness.   Musculoskeletal:      Cervical back: Neck supple.      Right lower leg: No edema.      Left lower leg: No edema.  "  Lymphadenopathy:      Cervical: No cervical adenopathy.      Upper Body:      Right upper body: No axillary adenopathy.   Skin:     General: Skin is warm and dry.      Findings: No lesion or rash.   Neurological:      General: No focal deficit present.      Mental Status: She is alert and oriented to person, place, and time.      Cranial Nerves: Cranial nerves are intact.   Psychiatric:         Mood and Affect: Affect normal. Mood is not anxious or depressed.         Vital Signs:   /58 (BP Location: Right arm, Patient Position: Sitting, Cuff Size: Adult)   Pulse 84   Temp 98.2 °F (36.8 °C) (Temporal)   Resp 20   Ht 152.4 cm (60\")   Wt 99.7 kg (219 lb 11.2 oz)   SpO2 100% Comment: room air  BMI 42.91 kg/m²        Result Review :     CMP    CMP 4/20/22 8/25/22 9/19/22   Glucose 120 (A) 163 (A) 143 (A)   BUN 17 34 (A) 35 (A)   Creatinine 0.95 1.21 (A) 1.24 (A)   Sodium 137 134 (A) 134 (A)   Potassium 4.8 5.0 5.2   Chloride 98 96 (A) 98   Calcium 9.5 9.9 9.8   Albumin 4.20 4.30 4.20   Total Bilirubin 0.3 0.4 0.3   Alkaline Phosphatase 72 71 71   AST (SGOT) 17 13 12   ALT (SGPT) 16 12 12   (A) Abnormal value            CBC w/diff    CBC w/Diff 10/26/21 4/20/22 8/25/22   WBC 12.49 (A) 11.75 (A) 11.95 (A)   RBC 4.03 4.17 4.32   Hemoglobin 11.0 (A) 10.4 (A) 11.5 (A)   Hematocrit 36.0 33.4 (A) 35.8   MCV 89.3 80.1 82.9   MCH 27.3 24.9 (A) 26.6   MCHC 30.6 (A) 31.1 (A) 32.1   RDW 13.9 15.6 (A) 15.3   Platelets 334 368 307   Neutrophil Rel % 75.7 78.2 (A) 73.3   Immature Granulocyte Rel % 0.4 0.3 0.3   Lymphocyte Rel % 13.5 (A) 12.7 (A) 14.3 (A)   Monocyte Rel % 8.0 6.2 7.9   Eosinophil Rel % 2.2 2.2 3.8   Basophil Rel % 0.2 0.4 0.4   (A) Abnormal value            Data reviewed: Radiologic studies Chest CT 7/15/2019, chest x-ray 11/17/2021, pulmonary function test 5/16/2019, echocardiogram 3/17/2022 and my last office note   Procedures        Assessment and Plan    Diagnoses and all orders for this visit:    1. " Chronic bronchitis, unspecified chronic bronchitis type (HCC) (Primary)  -     Breo Ellipta 200-25 MCG/INH inhaler; Inhale 1 puff Daily.  Dispense: 60 each; Refill: 11    2. Moderate persistent asthma, unspecified whether complicated  -     Breo Ellipta 200-25 MCG/INH inhaler; Inhale 1 puff Daily.  Dispense: 60 each; Refill: 11    3. Allergic rhinitis, unspecified seasonality, unspecified trigger    4. Seasonal allergies    5. Chronic cough    6. Dyspnea on exertion    7. Morbid obesity with BMI of 40.0-44.9, adult (HCC)    8.  Continue Breo as prescribed.  Rinse mouth out after each use.  9.  Continue albuterol as needed.  10.  Continue Zyrtec and Singulair.  11.  Follow-up primary care as scheduled.  12.  Follow-up with cardiology as scheduled.  13.  Follow-up with me in 3 to 4 months, sooner if needed.        Follow Up   Return in about 5 months (around 2/21/2023) for Recheck with Harleen.  Patient was given instructions and counseling regarding her condition or for health maintenance advice. Please see specific information pulled into the AVS if appropriate.

## 2022-10-05 ENCOUNTER — CLINICAL SUPPORT (OUTPATIENT)
Dept: INTERNAL MEDICINE | Facility: CLINIC | Age: 87
End: 2022-10-05

## 2022-10-05 DIAGNOSIS — Z23 NEED FOR VACCINATION: Primary | ICD-10-CM

## 2022-10-05 PROCEDURE — 90662 IIV NO PRSV INCREASED AG IM: CPT | Performed by: INTERNAL MEDICINE

## 2022-10-05 PROCEDURE — G0008 ADMIN INFLUENZA VIRUS VAC: HCPCS | Performed by: INTERNAL MEDICINE

## 2022-10-17 RX ORDER — POTASSIUM CHLORIDE 750 MG/1
CAPSULE, EXTENDED RELEASE ORAL
Qty: 60 CAPSULE | Refills: 5 | Status: SHIPPED | OUTPATIENT
Start: 2022-10-17

## 2022-10-19 DIAGNOSIS — F41.9 ANXIETY: ICD-10-CM

## 2022-10-19 DIAGNOSIS — E78.00 PURE HYPERCHOLESTEROLEMIA: Chronic | ICD-10-CM

## 2022-10-19 RX ORDER — LOSARTAN POTASSIUM 50 MG/1
50 TABLET ORAL 2 TIMES DAILY
Qty: 180 TABLET | Refills: 2 | Status: SHIPPED | OUTPATIENT
Start: 2022-10-19

## 2022-10-19 RX ORDER — PRAVASTATIN SODIUM 80 MG/1
80 TABLET ORAL DAILY
Qty: 90 TABLET | Refills: 2 | Status: SHIPPED | OUTPATIENT
Start: 2022-10-19

## 2022-10-19 RX ORDER — PAROXETINE HYDROCHLORIDE 40 MG/1
40 TABLET, FILM COATED ORAL DAILY
Qty: 90 TABLET | Refills: 0 | Status: SHIPPED | OUTPATIENT
Start: 2022-10-19 | End: 2023-01-19

## 2022-11-14 RX ORDER — CARVEDILOL 12.5 MG/1
TABLET ORAL
Qty: 270 TABLET | Refills: 1 | Status: SHIPPED | OUTPATIENT
Start: 2022-11-14

## 2022-12-27 DIAGNOSIS — E13.9 DIABETES 1.5, MANAGED AS TYPE 2: ICD-10-CM

## 2022-12-27 RX ORDER — CLOBETASOL PROPIONATE 0.5 MG/G
CREAM TOPICAL
Qty: 45 G | Refills: 3 | Status: SHIPPED | OUTPATIENT
Start: 2022-12-27

## 2023-01-02 DIAGNOSIS — E13.9 DIABETES 1.5, MANAGED AS TYPE 2: ICD-10-CM

## 2023-01-03 RX ORDER — ESTRADIOL 0.1 MG/G
CREAM VAGINAL
Qty: 42.5 G | Refills: 12 | Status: SHIPPED | OUTPATIENT
Start: 2023-01-03

## 2023-01-06 ENCOUNTER — TELEPHONE (OUTPATIENT)
Dept: INTERNAL MEDICINE | Facility: CLINIC | Age: 88
End: 2023-01-06
Payer: MEDICARE

## 2023-01-06 NOTE — TELEPHONE ENCOUNTER
Pharmacy called stating that her Estradiol Vaginal Cream dose is too high.  Pharmacist states that it should be used every day for two weeks and then it goes to twice a week, not every day.  Please advise....

## 2023-01-14 DIAGNOSIS — F41.9 ANXIETY: ICD-10-CM

## 2023-01-16 RX ORDER — PAROXETINE HYDROCHLORIDE 40 MG/1
TABLET, FILM COATED ORAL
Qty: 90 TABLET | Refills: 0 | OUTPATIENT
Start: 2023-01-16

## 2023-01-16 NOTE — TELEPHONE ENCOUNTER
Last OV: 09-15-22    Next  OV: 06-19-23    Medication needs to come from PCP we do not monitor antidepressants

## 2023-01-19 ENCOUNTER — TELEPHONE (OUTPATIENT)
Dept: INTERNAL MEDICINE | Facility: CLINIC | Age: 88
End: 2023-01-19
Payer: MEDICARE

## 2023-01-19 DIAGNOSIS — F41.9 ANXIETY: ICD-10-CM

## 2023-01-19 RX ORDER — PAROXETINE HYDROCHLORIDE 40 MG/1
TABLET, FILM COATED ORAL
Qty: 90 TABLET | Refills: 3 | Status: SHIPPED | OUTPATIENT
Start: 2023-01-19 | End: 2023-01-19 | Stop reason: SDUPTHER

## 2023-01-19 RX ORDER — PAROXETINE HYDROCHLORIDE 40 MG/1
40 TABLET, FILM COATED ORAL DAILY
Qty: 90 TABLET | Refills: 3 | Status: SHIPPED | OUTPATIENT
Start: 2023-01-19

## 2023-01-19 RX ORDER — PAROXETINE HYDROCHLORIDE 40 MG/1
40 TABLET, FILM COATED ORAL DAILY
Qty: 90 TABLET | Refills: 3 | OUTPATIENT
Start: 2023-01-19

## 2023-01-19 NOTE — TELEPHONE ENCOUNTER
Caller: WILFRIDO SALDIVAR    Relationship: Emergency Contact    Best call back number: [phone]    Requested Prescriptions:   Requested Prescriptions     Pending Prescriptions Disp Refills   • PARoxetine (PAXIL) 40 MG tablet 90 tablet 3     Sig: Take 1 tablet by mouth Daily.        Pharmacy where request should be sent: [phone] [place], [place] - [address] AT [facility] [address] & [facility] [phone] [facility] [phone] [place]     Does the patient have less than a 3 day supply:  [x] Yes  [] No    Would you like a call back once the refill request has been completed: [x] Yes [] No    If the office needs to give you a call back, can they leave a voicemail: [x] Yes [] No    Mary Ellen Regalado Rep   01/19/23 15:33 EST

## 2023-02-21 NOTE — PROGRESS NOTES
Primary Care Provider  Jacobo Redmond MD     Referring Provider  No ref. provider found     Chief Complaint  Shortness of Breath (With exertion), Follow-up (5 month f/up ), Asthma, and Bronchitis (chronic)    Subjective          Devi Diallo presents to [facility] PULMONARY & CRITICAL CARE MEDICINE  History of Present Illness  Devi Diallo is a 87 y.o. female patient of Dr. Timmons here for management of chronic bronchitis, moderate persistent asthma, cough, seasonal allergies and dyspnea on exertion.     Patient states she is doing well since her last visit.  She denies using antibiotics or steroids for her lungs.  She denies any fevers or chills.  Patient states her shortness of breath is mild in severity, worse with exertion and improved with rest.  She has an occasionally productive cough with clear sputum.  She continues to use Breo as prescribed.  She states that she uses her albuterol inhaler proximately twice a week.  She continues to use Singulair and Zyrtec for allergies.  She continues to follow-up with cardiology for management of hypertension and mitral valve regurgitation.  Her most recent echocardiogram from March 2022 shows grade 1 diastolic dysfunction, moderate mitral valve regurgitation and an mildly elevated right ventricular systolic pressure.  She continues to take her antihypertensives and diuretics as scheduled.  Overall, patient is doing well and has no additional concerns at this time.  She is able to perform her ADLs without difficulty.  She is up-to-date with her COVID, flu and pneumonia vaccines.     Her history of smoking is   Tobacco Use: Low Risk    • Smoking Tobacco Use: Never   • Smokeless Tobacco Use: Never   • Passive Exposure: Not on file   .    Review of Systems   Constitutional: Negative for chills, fatigue, fever, unexpected weight gain and unexpected weight loss.   HENT: Negative for congestion (Nasal), hearing loss, mouth sores, nosebleeds,  postnasal drip, sore throat and trouble swallowing.    Eyes: Negative for blurred vision and visual disturbance.   Respiratory: Positive for cough and shortness of breath. Negative for apnea and wheezing.         Negative for Hemoptysis     Cardiovascular: Negative for chest pain, palpitations and leg swelling.   Gastrointestinal: Negative for abdominal pain, constipation, diarrhea, nausea, vomiting and GERD.        Negative for Jaundice  Negative for Bloating  Negative for Melena   Musculoskeletal: Negative for joint swelling and myalgias.        Negative for Joint pain  Negative for Joint stiffness   Skin: Negative for color change.        Negative for cyanosis   Neurological: Negative for syncope, weakness, numbness and headache.      Sleep: Negative for Excessive daytime sleepiness  Negative for morning headaches  Negative for Snoring    Family History   Problem Relation Age of Onset   • Cancer Mother         Unspecified   • Arthritis Mother    • Diabetes Brother         Unspecified type   • Arthritis Brother         Social History     Socioeconomic History   • Marital status:    Tobacco Use   • Smoking status: Never   • Smokeless tobacco: Never   Vaping Use   • Vaping Use: Never used   Substance and Sexual Activity   • Alcohol use: Never   • Drug use: Never   • Sexual activity: Defer        Past Medical History:   Diagnosis Date   • Arthritis    • Atherosclerotic heart disease of native coronary artery without angina pectoris 1/1/2002    cardiac catheterization 2002   • Diabetes (HCC)    • Essential hypertension 9/20/2012   • Hyperlipemia    • Hypertension    • Pure hypercholesterolemia 4/25/2014   • Reflux esophagitis    • Seasonal allergies    • Stage 2 chronic kidney disease 9/20/2012        Immunization History   Administered Date(s) Administered   • COVID-19 (MODERNA) 1st, 2nd, 3rd Dose Only 02/18/2021, 03/19/2021   • Fluzone High Dose =>65 Years ([employer] ONLY) 10/16/2020   • Fluzone High-Dose  65+yrs 10/08/2021, 10/05/2022   • Pneumococcal, Unspecified 10/20/2019, 10/20/2019         Allergies   Allergen Reactions   • Amoxicillin-Pot Clavulanate Hives and Unknown - Low Severity   • Cipro [Ciprofloxacin Hcl] Hives   • Ciprofloxacin Unknown - Low Severity   • Clindamycin Unknown - Low Severity   • Macrobid [Nitrofurantoin] Hives   • Penicillins Hives   • Sulfamethoxazole-Trimethoprim Nausea Only and Unknown - Low Severity   • Clindamycin/Lincomycin Rash          Current Outpatient Medications:   •  albuterol sulfate  (90 Base) MCG/ACT inhaler, Inhale 2 puffs Every 4 (Four) Hours As Needed for Wheezing., Disp: , Rfl:   •  amLODIPine (NORVASC) 5 MG tablet, TAKE ONE TABLET BY MOUTH DAILY, Disp: 90 tablet, Rfl: 3  •  Ascorbic Acid (Vitamin C) 500 MG capsule, Take 500 mg by mouth., Disp: , Rfl:   •  Aspirin 81 MG capsule, Daily., Disp: , Rfl:   •  Breo Ellipta 200-25 MCG/INH inhaler, Inhale 1 puff Daily., Disp: 60 each, Rfl: 11  •  carvedilol (COREG) 12.5 MG tablet, TAKE 1 AND 1/2 TABLET BY MOUTH TWO TIMES A DAY, Disp: 270 tablet, Rfl: 1  •  cetirizine (zyrTEC) 10 MG tablet, Take 10 mg by mouth Daily., Disp: , Rfl:   •  chlorhexidine (PERIDEX) 0.12 % solution, , Disp: , Rfl:   •  cholecalciferol (VITAMIN D3) 25 MCG (1000 UT) tablet, Take 1,000 Units by mouth Daily., Disp: , Rfl:   •  clobetasol (TEMOVATE) 0.05 % cream, APPLY TO AFFECTED AREA(S) TWO TIMES A DAY, Disp: 45 g, Rfl: 3  •  Cranberry 1000 MG capsule, Take 1,000 mg by mouth., Disp: , Rfl:   •  Denta 5000 Plus 1.1 % cream, , Disp: , Rfl:   •  estradiol (ESTRACE) 0.1 MG/GM vaginal cream, INSERT 2 GRAMS INTO THE VAGINA DAILY (Patient taking differently: Insert 2 g into the vagina 2 (Two) Times a Week.), Disp: 42.5 g, Rfl: 12  •  ezetimibe (ZETIA) 10 MG tablet, Take 1 tablet by mouth Daily., Disp: 90 tablet, Rfl: 3  •  ferrous sulfate 325 (65 FE) MG tablet, Take 1 tablet by mouth Daily With Breakfast., Disp: 90 tablet, Rfl: 3  •  furosemide (LASIX) 20  MG tablet, TAKE ONE TABLET BY MOUTH TWICE A DAY, Disp: 60 tablet, Rfl: 5  •  glimepiride (AMARYL) 2 MG tablet, TAKE ONE TABLET BY MOUTH TWICE A DAY, Disp: 180 tablet, Rfl: 1  •  Januvia 50 MG tablet, TAKE ONE TABLET BY MOUTH DAILY, Disp: 90 tablet, Rfl: 3  •  losartan (COZAAR) 50 MG tablet, Take 1 tablet by mouth 2 (Two) Times a Day., Disp: 180 tablet, Rfl: 2  •  metFORMIN (GLUCOPHAGE) 850 MG tablet, TAKE ONE TABLET BY MOUTH TWICE A DAY, Disp: 180 tablet, Rfl: 3  •  montelukast (SINGULAIR) 10 MG tablet, , Disp: , Rfl:   •  multivitamin with minerals tablet tablet, Take 1 tablet by mouth Daily., Disp: , Rfl:   •  nitroglycerin (NITROSTAT) 0.4 MG SL tablet, 1 under the tongue as needed for angina, may repeat q5mins for up three doses, Disp: 100 tablet, Rfl: 11  •  pantoprazole (PROTONIX) 40 MG EC tablet, TAKE ONE TABLET BY MOUTH DAILY, Disp: 90 tablet, Rfl: 3  •  PARoxetine (PAXIL) 40 MG tablet, Take 1 tablet by mouth Daily., Disp: 90 tablet, Rfl: 3  •  potassium chloride (MICRO-K) 10 MEQ CR capsule, TAKE ONE CAPSULE BY MOUTH TWICE A DAY WITH FOOD, Disp: 60 capsule, Rfl: 5  •  pravastatin (PRAVACHOL) 80 MG tablet, Take 1 tablet by mouth Daily., Disp: 90 tablet, Rfl: 2  •  Zinc 25 MG tablet, Take 25 mg by mouth., Disp: , Rfl:      Objective   Physical Exam  Constitutional:       General: She is not in acute distress.     Appearance: Normal appearance. She is obese.   HENT:      Right Ear: Hearing normal.      Left Ear: Hearing normal.      Nose: No nasal tenderness or congestion.      Mouth/Throat:      Mouth: Mucous membranes are moist. No oral lesions.   Eyes:      Extraocular Movements: Extraocular movements intact.      Pupils: Pupils are equal, round, and reactive to light.   Neck:      Thyroid: No thyroid mass or thyromegaly.   Cardiovascular:      Rate and Rhythm: Normal rate and regular rhythm.      Pulses: Normal pulses.      Heart sounds: Normal heart sounds. No murmur heard.  Pulmonary:      Effort:  "Pulmonary effort is normal.      Breath sounds: Normal breath sounds. No wheezing, rhonchi or rales.   Abdominal:      General: Bowel sounds are normal. There is no distension.      Palpations: Abdomen is soft.      Tenderness: There is no abdominal tenderness.   Musculoskeletal:      Cervical back: Neck supple.      Right lower leg: No edema.      Left lower leg: No edema.   Lymphadenopathy:      Cervical: No cervical adenopathy.      Upper Body:      Right upper body: No axillary adenopathy.   Skin:     General: Skin is warm and dry.      Findings: No lesion or rash.   Neurological:      General: No focal deficit present.      Mental Status: She is alert and oriented to person, place, and time.   Psychiatric:         Mood and Affect: Affect normal. Mood is not anxious or depressed.         Vital Signs:   /55 (BP Location: Right arm, Patient Position: Sitting, Cuff Size: Adult)   Pulse 75   Temp 98.2 °F (36.8 °C) (Temporal)   Resp 18   Ht 152.4 cm (60\")   Wt 99.3 kg (219 lb)   SpO2 92% Comment: room air  BMI 42.77 kg/m²        Result Review :     CMP    CMP 4/20/22 8/25/22 9/19/22   Glucose 120 (A) 163 (A) 143 (A)   BUN 17 34 (A) 35 (A)   Creatinine 0.95 1.21 (A) 1.24 (A)   eGFR 59.2 (A) 44.3 (A) 43.0 (A)   Sodium 137 134 (A) 134 (A)   Potassium 4.8 5.0 5.2   Chloride 98 96 (A) 98   Calcium 9.5 9.9 9.8   Total Protein 7.2 7.1 6.9   Albumin 4.20 4.30 4.20   Globulin 3.0 2.8 2.7   Total Bilirubin 0.3 0.4 0.3   Alkaline Phosphatase 72 71 71   AST (SGOT) 17 13 12   ALT (SGPT) 16 12 12   Albumin/Globulin Ratio 1.4 1.5 1.6   BUN/Creatinine Ratio 17.9 28.1 (A) 28.2 (A)   Anion Gap 14.0 10.0 9.0   (A) Abnormal value       Comments are available for some flowsheets but are not being displayed.           CBC w/diff    CBC w/Diff 4/20/22 8/25/22   WBC 11.75 (A) 11.95 (A)   RBC 4.17 4.32   Hemoglobin 10.4 (A) 11.5 (A)   Hematocrit 33.4 (A) 35.8   MCV 80.1 82.9   MCH 24.9 (A) 26.6   MCHC 31.1 (A) 32.1   RDW 15.6 " (A) 15.3   Platelets 368 307   Neutrophil Rel % 78.2 (A) 73.3   Immature Granulocyte Rel % 0.3 0.3   Lymphocyte Rel % 12.7 (A) 14.3 (A)   Monocyte Rel % 6.2 7.9   Eosinophil Rel % 2.2 3.8   Basophil Rel % 0.4 0.4   (A) Abnormal value            Data reviewed: Radiologic studies Chest CT 7/15/2019, chest x-ray 11/17/2021, pulmonary function test 5/16/2019 and my last office note   Procedures        Assessment and Plan    Diagnoses and all orders for this visit:    1. Simple chronic bronchitis (HCC) (Primary)    2. Dyspnea on exertion    3. Moderate persistent asthma, unspecified whether complicated    4. Seasonal allergies    5. Morbid obesity with BMI of 40.0-44.9, adult (HCC)    6. Diastolic dysfunction    7. Essential hypertension    8.  Continue Breo as prescribed.  Rinse mouth out after each use.  9.  Continue albuterol as needed.  10.  Continue Zyrtec and Singulair.  11.  Follow-up with cardiology as scheduled.  12.  Encourage patient try to stay as active as possible.  Recommend 30 minutes of daily activity.  13.  Follow-up with me in 6 months, sooner if needed.    I spent 30 minutes caring for Devi on this date of service. This time includes time spent by me in the following activities:preparing for the visit, reviewing tests, obtaining and/or reviewing a separately obtained history, performing a medically appropriate examination and/or evaluation , counseling and educating the patient/family/caregiver and documenting information in the medical record    Follow Up   Return in about 6 months (around 8/22/2023) for Recheck with Harleen.  Patient was given instructions and counseling regarding her condition or for health maintenance advice. Please see specific information pulled into the AVS if appropriate.

## 2023-02-22 ENCOUNTER — OFFICE VISIT (OUTPATIENT)
Dept: PULMONOLOGY | Facility: CLINIC | Age: 88
End: 2023-02-22
Payer: MEDICARE

## 2023-02-22 VITALS
RESPIRATION RATE: 18 BRPM | DIASTOLIC BLOOD PRESSURE: 55 MMHG | TEMPERATURE: 98.2 F | OXYGEN SATURATION: 92 % | HEIGHT: 60 IN | HEART RATE: 75 BPM | SYSTOLIC BLOOD PRESSURE: 152 MMHG | BODY MASS INDEX: 43 KG/M2 | WEIGHT: 219 LBS

## 2023-02-22 DIAGNOSIS — I51.89 DIASTOLIC DYSFUNCTION: ICD-10-CM

## 2023-02-22 DIAGNOSIS — J30.2 SEASONAL ALLERGIES: ICD-10-CM

## 2023-02-22 DIAGNOSIS — J45.40 MODERATE PERSISTENT ASTHMA, UNSPECIFIED WHETHER COMPLICATED: ICD-10-CM

## 2023-02-22 DIAGNOSIS — E66.01 MORBID OBESITY WITH BMI OF 40.0-44.9, ADULT: ICD-10-CM

## 2023-02-22 DIAGNOSIS — I10 ESSENTIAL HYPERTENSION: Chronic | ICD-10-CM

## 2023-02-22 DIAGNOSIS — R06.09 DYSPNEA ON EXERTION: ICD-10-CM

## 2023-02-22 DIAGNOSIS — J41.0 SIMPLE CHRONIC BRONCHITIS: Primary | ICD-10-CM

## 2023-02-22 PROCEDURE — 99214 OFFICE O/P EST MOD 30 MIN: CPT | Performed by: NURSE PRACTITIONER

## 2023-02-22 RX ORDER — CHLORHEXIDINE GLUCONATE 0.12 MG/ML
RINSE ORAL
COMMUNITY
Start: 2023-02-14

## 2023-02-22 RX ORDER — SODIUM FLUORIDE 1.1 G/100G
GEL, DENTIFRICE ORAL
COMMUNITY
Start: 2023-02-14

## 2023-02-28 ENCOUNTER — LAB (OUTPATIENT)
Dept: LAB | Facility: HOSPITAL | Age: 88
End: 2023-02-28
Payer: MEDICARE

## 2023-02-28 DIAGNOSIS — N18.2 STAGE 2 CHRONIC KIDNEY DISEASE: ICD-10-CM

## 2023-02-28 DIAGNOSIS — E11.9 TYPE 2 DIABETES MELLITUS WITHOUT COMPLICATION, WITHOUT LONG-TERM CURRENT USE OF INSULIN: ICD-10-CM

## 2023-02-28 DIAGNOSIS — I34.0 NONRHEUMATIC MITRAL VALVE REGURGITATION: ICD-10-CM

## 2023-02-28 DIAGNOSIS — I10 ESSENTIAL HYPERTENSION: ICD-10-CM

## 2023-02-28 DIAGNOSIS — D50.9 IRON DEFICIENCY ANEMIA, UNSPECIFIED IRON DEFICIENCY ANEMIA TYPE: ICD-10-CM

## 2023-02-28 DIAGNOSIS — J30.2 SEASONAL ALLERGIC RHINITIS, UNSPECIFIED TRIGGER: ICD-10-CM

## 2023-02-28 DIAGNOSIS — I25.10 ATHEROSCLEROSIS OF NATIVE CORONARY ARTERY OF NATIVE HEART WITHOUT ANGINA PECTORIS: ICD-10-CM

## 2023-02-28 DIAGNOSIS — K21.9 GASTRO-ESOPHAGEAL REFLUX DISEASE WITHOUT ESOPHAGITIS: ICD-10-CM

## 2023-02-28 DIAGNOSIS — E66.01 CLASS 3 SEVERE OBESITY DUE TO EXCESS CALORIES WITH SERIOUS COMORBIDITY AND BODY MASS INDEX (BMI) OF 40.0 TO 44.9 IN ADULT: ICD-10-CM

## 2023-02-28 LAB
ALBUMIN SERPL-MCNC: 4.2 G/DL (ref 3.5–5.2)
ALBUMIN/GLOB SERPL: 1.2 G/DL
ALP SERPL-CCNC: 65 U/L (ref 39–117)
ALT SERPL W P-5'-P-CCNC: 10 U/L (ref 1–33)
ANION GAP SERPL CALCULATED.3IONS-SCNC: 11 MMOL/L (ref 5–15)
AST SERPL-CCNC: 13 U/L (ref 1–32)
BASOPHILS # BLD AUTO: 0.04 10*3/MM3 (ref 0–0.2)
BASOPHILS NFR BLD AUTO: 0.3 % (ref 0–1.5)
BILIRUB SERPL-MCNC: 0.4 MG/DL (ref 0–1.2)
BUN SERPL-MCNC: 41 MG/DL (ref 8–23)
BUN/CREAT SERPL: 31.8 (ref 7–25)
CALCIUM SPEC-SCNC: 10.4 MG/DL (ref 8.6–10.5)
CHLORIDE SERPL-SCNC: 98 MMOL/L (ref 98–107)
CHOLEST SERPL-MCNC: 132 MG/DL (ref 0–200)
CO2 SERPL-SCNC: 31 MMOL/L (ref 22–29)
CREAT SERPL-MCNC: 1.29 MG/DL (ref 0.57–1)
DEPRECATED RDW RBC AUTO: 45 FL (ref 37–54)
EGFRCR SERPLBLD CKD-EPI 2021: 40.8 ML/MIN/1.73
EOSINOPHIL # BLD AUTO: 0.37 10*3/MM3 (ref 0–0.4)
EOSINOPHIL NFR BLD AUTO: 3.2 % (ref 0.3–6.2)
ERYTHROCYTE [DISTWIDTH] IN BLOOD BY AUTOMATED COUNT: 13.7 % (ref 12.3–15.4)
GLOBULIN UR ELPH-MCNC: 3.4 GM/DL
GLUCOSE SERPL-MCNC: 119 MG/DL (ref 65–99)
HBA1C MFR BLD: 6.2 % (ref 4.8–5.6)
HCT VFR BLD AUTO: 38.1 % (ref 34–46.6)
HDLC SERPL-MCNC: 47 MG/DL (ref 40–60)
HGB BLD-MCNC: 12.3 G/DL (ref 12–15.9)
IMM GRANULOCYTES # BLD AUTO: 0.04 10*3/MM3 (ref 0–0.05)
IMM GRANULOCYTES NFR BLD AUTO: 0.3 % (ref 0–0.5)
LDLC SERPL CALC-MCNC: 59 MG/DL (ref 0–100)
LDLC/HDLC SERPL: 1.17 {RATIO}
LYMPHOCYTES # BLD AUTO: 1.57 10*3/MM3 (ref 0.7–3.1)
LYMPHOCYTES NFR BLD AUTO: 13.5 % (ref 19.6–45.3)
MCH RBC QN AUTO: 29.1 PG (ref 26.6–33)
MCHC RBC AUTO-ENTMCNC: 32.3 G/DL (ref 31.5–35.7)
MCV RBC AUTO: 90.1 FL (ref 79–97)
MONOCYTES # BLD AUTO: 0.84 10*3/MM3 (ref 0.1–0.9)
MONOCYTES NFR BLD AUTO: 7.2 % (ref 5–12)
NEUTROPHILS NFR BLD AUTO: 75.5 % (ref 42.7–76)
NEUTROPHILS NFR BLD AUTO: 8.77 10*3/MM3 (ref 1.7–7)
NRBC BLD AUTO-RTO: 0 /100 WBC (ref 0–0.2)
PLATELET # BLD AUTO: 286 10*3/MM3 (ref 140–450)
PMV BLD AUTO: 9.6 FL (ref 6–12)
POTASSIUM SERPL-SCNC: 4.9 MMOL/L (ref 3.5–5.2)
PROT SERPL-MCNC: 7.6 G/DL (ref 6–8.5)
RBC # BLD AUTO: 4.23 10*6/MM3 (ref 3.77–5.28)
SODIUM SERPL-SCNC: 140 MMOL/L (ref 136–145)
TRIGL SERPL-MCNC: 151 MG/DL (ref 0–150)
VLDLC SERPL-MCNC: 26 MG/DL (ref 5–40)
WBC NRBC COR # BLD: 11.63 10*3/MM3 (ref 3.4–10.8)

## 2023-02-28 PROCEDURE — 80053 COMPREHEN METABOLIC PANEL: CPT

## 2023-02-28 PROCEDURE — 80061 LIPID PANEL: CPT

## 2023-02-28 PROCEDURE — 36415 COLL VENOUS BLD VENIPUNCTURE: CPT

## 2023-02-28 PROCEDURE — 85025 COMPLETE CBC W/AUTO DIFF WBC: CPT

## 2023-02-28 PROCEDURE — 83036 HEMOGLOBIN GLYCOSYLATED A1C: CPT

## 2023-03-01 ENCOUNTER — OFFICE VISIT (OUTPATIENT)
Dept: INTERNAL MEDICINE | Facility: CLINIC | Age: 88
End: 2023-03-01
Payer: MEDICARE

## 2023-03-01 VITALS
HEIGHT: 60 IN | BODY MASS INDEX: 41.15 KG/M2 | DIASTOLIC BLOOD PRESSURE: 69 MMHG | SYSTOLIC BLOOD PRESSURE: 133 MMHG | WEIGHT: 209.6 LBS | OXYGEN SATURATION: 96 % | TEMPERATURE: 97.3 F | HEART RATE: 67 BPM

## 2023-03-01 DIAGNOSIS — N18.31 STAGE 3A CHRONIC KIDNEY DISEASE: ICD-10-CM

## 2023-03-01 DIAGNOSIS — E11.9 TYPE 2 DIABETES MELLITUS WITHOUT COMPLICATION, WITHOUT LONG-TERM CURRENT USE OF INSULIN: ICD-10-CM

## 2023-03-01 DIAGNOSIS — I34.0 NONRHEUMATIC MITRAL VALVE REGURGITATION: ICD-10-CM

## 2023-03-01 DIAGNOSIS — N39.41 URGE INCONTINENCE OF URINE: ICD-10-CM

## 2023-03-01 DIAGNOSIS — I10 ESSENTIAL HYPERTENSION: Chronic | ICD-10-CM

## 2023-03-01 DIAGNOSIS — D72.9 WHITE BLOOD CELL DISORDER: ICD-10-CM

## 2023-03-01 DIAGNOSIS — J30.2 SEASONAL ALLERGIC RHINITIS, UNSPECIFIED TRIGGER: ICD-10-CM

## 2023-03-01 DIAGNOSIS — D50.9 IRON DEFICIENCY ANEMIA, UNSPECIFIED IRON DEFICIENCY ANEMIA TYPE: ICD-10-CM

## 2023-03-01 DIAGNOSIS — K21.9 GASTRO-ESOPHAGEAL REFLUX DISEASE WITHOUT ESOPHAGITIS: Primary | ICD-10-CM

## 2023-03-01 DIAGNOSIS — I25.10 ATHEROSCLEROSIS OF NATIVE CORONARY ARTERY OF NATIVE HEART WITHOUT ANGINA PECTORIS: ICD-10-CM

## 2023-03-01 DIAGNOSIS — E66.01 CLASS 3 SEVERE OBESITY DUE TO EXCESS CALORIES WITH SERIOUS COMORBIDITY AND BODY MASS INDEX (BMI) OF 40.0 TO 44.9 IN ADULT: ICD-10-CM

## 2023-03-01 PROCEDURE — 99214 OFFICE O/P EST MOD 30 MIN: CPT | Performed by: INTERNAL MEDICINE

## 2023-03-01 NOTE — PROGRESS NOTES
"CHIEF COMPLAINT/ HPI:  Hypertension and Follow-up (Pt states that this is routine, she had labs and she has no new issues. )      Follow-up with blood sugars blood pressure anxiety depression and medication review, here with her daughter today,        Objective   Vital Signs  Vitals:    03/01/23 1114   BP: 133/69   Pulse: 67   Temp: 97.3 °F (36.3 °C)   TempSrc: Skin   SpO2: 96%   Weight: 95.1 kg (209 lb 9.6 oz)   Height: 152.4 cm (60\")      Body mass index is 40.93 kg/m².  Review of Systems   Constitutional: Negative.    HENT: Negative.    Eyes: Negative.    Respiratory: Negative.    Cardiovascular: Negative.    Gastrointestinal: Negative.    Endocrine: Negative.    Genitourinary: Negative.    Musculoskeletal: Negative.    Allergic/Immunologic: Negative.    Neurological: Negative.    Hematological: Negative.    Psychiatric/Behavioral: Negative.       Physical Exam  Constitutional:       General: She is not in acute distress.     Appearance: Normal appearance. She is obese.   HENT:      Head: Normocephalic.      Mouth/Throat:      Mouth: Mucous membranes are moist.   Eyes:      Conjunctiva/sclera: Conjunctivae normal.      Pupils: Pupils are equal, round, and reactive to light.   Cardiovascular:      Rate and Rhythm: Normal rate and regular rhythm.      Pulses: Normal pulses.      Heart sounds: Normal heart sounds.   Pulmonary:      Effort: Pulmonary effort is normal.      Breath sounds: Normal breath sounds.   Abdominal:      General: Bowel sounds are normal.      Palpations: Abdomen is soft.   Musculoskeletal:         General: No swelling. Normal range of motion.      Cervical back: Neck supple.   Skin:     General: Skin is warm and dry.      Coloration: Skin is not jaundiced.   Neurological:      General: No focal deficit present.      Mental Status: She is alert and oriented to person, place, and time. Mental status is at baseline.   Psychiatric:         Mood and Affect: Mood normal.         Behavior: Behavior " normal.         Thought Content: Thought content normal.         Judgment: Judgment normal.        Result Review :   Lab Results   Component Value Date    PROBNP 77.4 10/26/2021    PROBNP 72.2 06/14/2021    BNP 52 04/16/2019     CMP    CMP 8/25/22 9/19/22 2/28/23   Glucose 163 (A) 143 (A) 119 (A)   BUN 34 (A) 35 (A) 41 (A)   Creatinine 1.21 (A) 1.24 (A) 1.29 (A)   eGFR 44.3 (A) 43.0 (A) 40.8 (A)   Sodium 134 (A) 134 (A) 140   Potassium 5.0 5.2 4.9   Chloride 96 (A) 98 98   Calcium 9.9 9.8 10.4   Total Protein 7.1 6.9 7.6   Albumin 4.30 4.20 4.2   Globulin 2.8 2.7 3.4   Total Bilirubin 0.4 0.3 0.4   Alkaline Phosphatase 71 71 65   AST (SGOT) 13 12 13   ALT (SGPT) 12 12 10   Albumin/Globulin Ratio 1.5 1.6 1.2   BUN/Creatinine Ratio 28.1 (A) 28.2 (A) 31.8 (A)   Anion Gap 10.0 9.0 11.0   (A) Abnormal value       Comments are available for some flowsheets but are not being displayed.           CBC w/diff    CBC w/Diff 4/20/22 8/25/22 2/28/23   WBC 11.75 (A) 11.95 (A) 11.63 (A)   RBC 4.17 4.32 4.23   Hemoglobin 10.4 (A) 11.5 (A) 12.3   Hematocrit 33.4 (A) 35.8 38.1   MCV 80.1 82.9 90.1   MCH 24.9 (A) 26.6 29.1   MCHC 31.1 (A) 32.1 32.3   RDW 15.6 (A) 15.3 13.7   Platelets 368 307 286   Neutrophil Rel % 78.2 (A) 73.3 75.5   Immature Granulocyte Rel % 0.3 0.3 0.3   Lymphocyte Rel % 12.7 (A) 14.3 (A) 13.5 (A)   Monocyte Rel % 6.2 7.9 7.2   Eosinophil Rel % 2.2 3.8 3.2   Basophil Rel % 0.4 0.4 0.3   (A) Abnormal value             Lipid Panel    Lipid Panel 4/20/22 9/19/22 2/28/23   Total Cholesterol 150 110 132   Triglycerides 135 124 151 (A)   HDL Cholesterol 47 42 47   VLDL Cholesterol 24 22 26   LDL Cholesterol  79 46 59   LDL/HDL Ratio 1.62 1.03 1.17   (A) Abnormal value             Lab Results   Component Value Date    TSH 2.590 11/16/2020    TSH 2.860 11/05/2019    TSH 3.570 08/15/2019      Lab Results   Component Value Date    FREET4 1.2 08/15/2019      A1C Last 3 Results    HGBA1C Last 3 Results 4/20/22 2/28/23    Hemoglobin A1C 6.10 (A) 6.20 (A)   (A) Abnormal value                              Visit Diagnoses:    ICD-10-CM ICD-9-CM   1. Gastro-esophageal reflux disease without esophagitis  K21.9 530.81   2. Type 2 diabetes mellitus without complication, without long-term current use of insulin ([facility])  E11.9 250.00   3. Nonrheumatic mitral valve regurgitation  I34.0 424.0   4. Seasonal allergic rhinitis, unspecified trigger  J30.2 477.9   5. White blood cell disorder  D72.9 288.9   6. Iron deficiency anemia, unspecified iron deficiency anemia type  D50.9 280.9   7. Essential hypertension  I10 401.9   8. Class 3 severe obesity due to excess calories with serious comorbidity and body mass index (BMI) of 40.0 to 44.9 in adult ([facility])  E66.01 278.01    Z68.41 V85.41   9. Stage 3a chronic kidney disease ([facility])  N18.31 585.3   10. Urge incontinence of urine  N39.41 788.31   11. Atherosclerosis of native coronary artery of native heart without angina pectoris  I25.10 414.01       Assessment and Plan   Diagnoses and all orders for this visit:    1. Gastro-esophageal reflux disease without esophagitis (Primary)  -     CBC & Differential; Future  -     Comprehensive Metabolic Panel; Future  -     Hemoglobin A1c; Future  -     Lipid Panel; Future  -     TSH+Free T4; Future  -     MicroAlbumin, Urine, Random - Urine, Clean Catch; Future    2. Type 2 diabetes mellitus without complication, without long-term current use of insulin ([facility])  -     CBC & Differential; Future  -     Comprehensive Metabolic Panel; Future  -     Hemoglobin A1c; Future  -     Lipid Panel; Future  -     TSH+Free T4; Future  -     MicroAlbumin, Urine, Random - Urine, Clean Catch; Future    3. Nonrheumatic mitral valve regurgitation  -     CBC & Differential; Future  -     Comprehensive Metabolic Panel; Future  -     Hemoglobin A1c; Future  -     Lipid Panel; Future  -     TSH+Free T4; Future  -     MicroAlbumin, Urine, Random - Urine, Clean Catch; Future    4.  Seasonal allergic rhinitis, unspecified trigger  -     CBC & Differential; Future  -     Comprehensive Metabolic Panel; Future  -     Hemoglobin A1c; Future  -     Lipid Panel; Future  -     TSH+Free T4; Future  -     MicroAlbumin, Urine, Random - Urine, Clean Catch; Future    5. White blood cell disorder  -     CBC & Differential; Future  -     Comprehensive Metabolic Panel; Future  -     Hemoglobin A1c; Future  -     Lipid Panel; Future  -     TSH+Free T4; Future  -     MicroAlbumin, Urine, Random - Urine, Clean Catch; Future    6. Iron deficiency anemia, unspecified iron deficiency anemia type  -     CBC & Differential; Future  -     Comprehensive Metabolic Panel; Future  -     Hemoglobin A1c; Future  -     Lipid Panel; Future  -     TSH+Free T4; Future  -     MicroAlbumin, Urine, Random - Urine, Clean Catch; Future    7. Essential hypertension  -     CBC & Differential; Future  -     Comprehensive Metabolic Panel; Future  -     Hemoglobin A1c; Future  -     Lipid Panel; Future  -     TSH+Free T4; Future  -     MicroAlbumin, Urine, Random - Urine, Clean Catch; Future    8. Class 3 severe obesity due to excess calories with serious comorbidity and body mass index (BMI) of 40.0 to 44.9 in adult (HCC)  -     CBC & Differential; Future  -     Comprehensive Metabolic Panel; Future  -     Hemoglobin A1c; Future  -     Lipid Panel; Future  -     TSH+Free T4; Future  -     MicroAlbumin, Urine, Random - Urine, Clean Catch; Future    9. Stage 3a chronic kidney disease (HCC)  -     CBC & Differential; Future  -     Comprehensive Metabolic Panel; Future  -     Hemoglobin A1c; Future  -     Lipid Panel; Future  -     TSH+Free T4; Future  -     MicroAlbumin, Urine, Random - Urine, Clean Catch; Future    10. Urge incontinence of urine  -     CBC & Differential; Future  -     Comprehensive Metabolic Panel; Future  -     Hemoglobin A1c; Future  -     Lipid Panel; Future  -     TSH+Free T4; Future  -     MicroAlbumin, Urine,  Random - Urine, Clean Catch; Future    11. Atherosclerosis of native coronary artery of native heart without angina pectoris  -     CBC & Differential; Future  -     Comprehensive Metabolic Panel; Future  -     Hemoglobin A1c; Future  -     Lipid Panel; Future  -     TSH+Free T4; Future  -     MicroAlbumin, Urine, Random - Urine, Clean Catch; Future        lower extremity edema ---continue Lasix 20 mg twice daily , potassium 10 mEq twice daily cautioned on continued doubling the dose, would recommend maybe taking it once a day as needed, March 1, 2023 due to increasing creatinine    Hyperkalemia mild ---recommend decrease potassium to 1 a day August 29, 2022    respiratory infection cough wheezing asthma exacerbations ----CT scan of the chest, Chest x-ray shows questionable infiltrate versus fullness in the right hilum right lung field,--- SEEING DR BANKS --- continues CARLY CHIU, NOV 2019, ---PULM NOD. ON CT APRIL 2019, REPEATED CT ABD/PELVIS AND CHEST ---JUNE 2019,      AND THEN MRI OF ABD FOR RENAL MASS----- JULY, 2019,     ELEVATED WBC CHRONIC , STABLE - NOV 2020, through April 2022,-, and February 28, 2023,-------DID SEE DR BUCIO, NO RX     ALLERGRIES , CONT ZYRTEC AND SINGULAIR    HTN -- cont NORVASC 5 mg daily , Coreg 18.375 twice a day, losartan 50 bid,    IRON DEF ANEMIA, continues OTC iron supplements and vitamin C---- does not want to do colonoscopy endoscopy at this time,     DM , continue AMARYL 1 mg qam , Januvia 50 mg daily, metformin 850 mg twice a day,----hemoglobin A1c 6.2 February 2023    OBESITY, discussed October 2021    DEPRESSION-   continue on Paxil 40 mg daily    ELEVATED CHOL-continues PRAVASTATIN 80 MG QHS     CKD stage IIIa, creatinine up to 1.29 we will follow closely, discussed March 1, 2023,    Urge inCONTINENCE-continues OXYBUTYNIN DISCUSSED--USING ONE DAILY, estrogen cream daily or twice weekly      NEUROPATHY, RX OPTIONS DISCUSSED --B VITAMINS REC     EYE CHECKED --DOES JUAN LUIS,  JAN 2022     DOES YRLY MAMMO IN [place] --OCT 2017, HAD DIAGN. MAMMO AND DID BX , BENIGN--LMAMMO NOV 2019, [place] ,--OK TO STOP MAY 2020,    Follow Up   Return in about 6 months (around 9/1/2023).  Patient was given instructions and counseling regarding her condition or for health maintenance advice. Please see specific information pulled into the AVS if appropriate.

## 2023-03-21 NOTE — ASSESSMENT & PLAN NOTE
Hypertension is controlled on losartan and amlodipine carvedilol and diuretics.  She will continue the same.   16

## 2023-04-03 RX ORDER — SITAGLIPTIN 50 MG/1
TABLET, FILM COATED ORAL
Qty: 90 TABLET | Refills: 3 | Status: SHIPPED | OUTPATIENT
Start: 2023-04-03

## 2023-04-17 RX ORDER — EZETIMIBE 10 MG/1
TABLET ORAL
Qty: 90 TABLET | Refills: 3 | Status: SHIPPED | OUTPATIENT
Start: 2023-04-17

## 2023-05-01 RX ORDER — PANTOPRAZOLE SODIUM 40 MG/1
TABLET, DELAYED RELEASE ORAL
Qty: 90 TABLET | Refills: 3 | Status: SHIPPED | OUTPATIENT
Start: 2023-05-01

## 2023-05-09 RX ORDER — CARVEDILOL 12.5 MG/1
TABLET ORAL
Qty: 270 TABLET | Refills: 1 | Status: SHIPPED | OUTPATIENT
Start: 2023-05-09

## 2023-05-30 RX ORDER — AMLODIPINE BESYLATE 5 MG/1
TABLET ORAL
Qty: 90 TABLET | Refills: 3 | Status: SHIPPED | OUTPATIENT
Start: 2023-05-30

## 2023-05-30 RX ORDER — FUROSEMIDE 20 MG/1
TABLET ORAL
Qty: 180 TABLET | Refills: 1 | Status: SHIPPED | OUTPATIENT
Start: 2023-05-30

## 2023-06-19 ENCOUNTER — OFFICE VISIT (OUTPATIENT)
Dept: CARDIOLOGY | Facility: CLINIC | Age: 88
End: 2023-06-19
Payer: MEDICARE

## 2023-06-19 VITALS
WEIGHT: 213.8 LBS | DIASTOLIC BLOOD PRESSURE: 67 MMHG | HEART RATE: 80 BPM | BODY MASS INDEX: 41.98 KG/M2 | HEIGHT: 60 IN | SYSTOLIC BLOOD PRESSURE: 144 MMHG

## 2023-06-19 DIAGNOSIS — E78.00 HYPERCHOLESTEROLEMIA: ICD-10-CM

## 2023-06-19 DIAGNOSIS — R60.0 EDEMA LEG: ICD-10-CM

## 2023-06-19 DIAGNOSIS — I10 ESSENTIAL HYPERTENSION: Primary | Chronic | ICD-10-CM

## 2023-06-19 DIAGNOSIS — I25.10 ATHEROSCLEROSIS OF NATIVE CORONARY ARTERY OF NATIVE HEART WITHOUT ANGINA PECTORIS: ICD-10-CM

## 2023-06-19 PROCEDURE — 99214 OFFICE O/P EST MOD 30 MIN: CPT | Performed by: INTERNAL MEDICINE

## 2023-06-19 PROCEDURE — 3078F DIAST BP <80 MM HG: CPT | Performed by: INTERNAL MEDICINE

## 2023-06-19 PROCEDURE — 3077F SYST BP >= 140 MM HG: CPT | Performed by: INTERNAL MEDICINE

## 2023-06-19 NOTE — PROGRESS NOTES
Chief Complaint  Coronary Artery Disease, Hypertension, and Hyperlipidemia    Subjective        Devi Diallo presents to [facility] C[facility]  History of present illness:    Patient states overall she is doing well.  She denies any chest pain or palpitations.  She notes no bleeding problems.  She is taking the aspirin every day.  She still notes some edema but it is not bothering her.  She is taking the Lasix anywhere from 0 to twice a day.  She is watching her salt.      Past Medical History:   Diagnosis Date    Arthritis     Atherosclerotic heart disease of native coronary artery without angina pectoris 1/1/2002    cardiac catheterization 2002    Diabetes     Essential hypertension 9/20/2012    Hyperlipemia     Hypertension     Pure hypercholesterolemia 4/25/2014    Reflux esophagitis     Seasonal allergies     Stage 2 chronic kidney disease 9/20/2012         Past Surgical History:   Procedure Laterality Date    COLONOSCOPY      EYE SURGERY      Eye Implant    HYSTERECTOMY            Social History     Socioeconomic History    Marital status:    Tobacco Use    Smoking status: Never    Smokeless tobacco: Never   Vaping Use    Vaping Use: Never used   Substance and Sexual Activity    Alcohol use: Never    Drug use: Never    Sexual activity: Defer         Family History   Problem Relation Age of Onset    Cancer Mother         Unspecified    Arthritis Mother     Diabetes Brother         Unspecified type    Arthritis Brother           Allergies   Allergen Reactions    Amoxicillin-Pot Clavulanate Hives and Unknown - Low Severity    Cipro [Ciprofloxacin Hcl] Hives    Ciprofloxacin Unknown - Low Severity    Clindamycin Unknown - Low Severity    Clindamycin/Lincomycin Rash    Macrobid [Nitrofurantoin] Hives    Penicillins Hives    Sulfamethoxazole-Trimethoprim Nausea Only and Unknown - Low Severity            Current Outpatient Medications:     albuterol sulfate  (90 Base) MCG/ACT  inhaler, Inhale 2 puffs Every 4 (Four) Hours As Needed for Wheezing., Disp: , Rfl:     amLODIPine (NORVASC) 5 MG tablet, TAKE ONE TABLET BY MOUTH DAILY, Disp: 90 tablet, Rfl: 3    Ascorbic Acid (Vitamin C) 500 MG capsule, Take 500 mg by mouth., Disp: , Rfl:     Aspirin 81 MG capsule, Daily., Disp: , Rfl:     Breo Ellipta 200-25 MCG/INH inhaler, Inhale 1 puff Daily., Disp: 60 each, Rfl: 11    carvedilol (COREG) 12.5 MG tablet, TAKE 1 AND 1/2 TABLET BY MOUTH TWO TIMES A DAY, Disp: 270 tablet, Rfl: 1    cetirizine (zyrTEC) 10 MG tablet, Take 1 tablet by mouth Daily., Disp: , Rfl:     chlorhexidine (PERIDEX) 0.12 % solution, , Disp: , Rfl:     cholecalciferol (VITAMIN D3) 25 MCG (1000 UT) tablet, Take 1 tablet by mouth Daily., Disp: , Rfl:     clobetasol (TEMOVATE) 0.05 % cream, APPLY TO AFFECTED AREA(S) TWO TIMES A DAY, Disp: 45 g, Rfl: 3    Cranberry 1000 MG capsule, Take 1,000 mg by mouth., Disp: , Rfl:     Denta 5000 Plus 1.1 % cream, , Disp: , Rfl:     estradiol (ESTRACE) 0.1 MG/GM vaginal cream, INSERT 2 GRAMS INTO THE VAGINA DAILY (Patient taking differently: Insert 2 g into the vagina 2 (Two) Times a Week.), Disp: 42.5 g, Rfl: 12    ezetimibe (ZETIA) 10 MG tablet, TAKE ONE TABLET BY MOUTH DAILY, Disp: 90 tablet, Rfl: 3    ferrous sulfate 325 (65 FE) MG tablet, Take 1 tablet by mouth Daily With Breakfast., Disp: 90 tablet, Rfl: 3    furosemide (LASIX) 20 MG tablet, TAKE ONE TABLET BY MOUTH TWICE A DAY, Disp: 180 tablet, Rfl: 1    glimepiride (AMARYL) 2 MG tablet, TAKE ONE TABLET BY MOUTH TWICE A DAY, Disp: 180 tablet, Rfl: 1    Januvia 50 MG tablet, TAKE ONE TABLET BY MOUTH DAILY, Disp: 90 tablet, Rfl: 3    losartan (COZAAR) 50 MG tablet, Take 1 tablet by mouth 2 (Two) Times a Day., Disp: 180 tablet, Rfl: 2    metFORMIN (GLUCOPHAGE) 850 MG tablet, TAKE ONE TABLET BY MOUTH TWICE A DAY, Disp: 180 tablet, Rfl: 3    montelukast (SINGULAIR) 10 MG tablet, , Disp: , Rfl:     multivitamin with minerals tablet tablet,  "Take 1 tablet by mouth Daily., Disp: , Rfl:     nitroglycerin (NITROSTAT) 0.4 MG SL tablet, 1 under the tongue as needed for angina, may repeat q5mins for up three doses, Disp: 100 tablet, Rfl: 11    pantoprazole (PROTONIX) 40 MG EC tablet, TAKE ONE TABLET BY MOUTH DAILY, Disp: 90 tablet, Rfl: 3    PARoxetine (PAXIL) 40 MG tablet, Take 1 tablet by mouth Daily., Disp: 90 tablet, Rfl: 3    potassium chloride (MICRO-K) 10 MEQ CR capsule, TAKE ONE CAPSULE BY MOUTH TWICE A DAY WITH FOOD, Disp: 60 capsule, Rfl: 5    pravastatin (PRAVACHOL) 80 MG tablet, Take 1 tablet by mouth Daily., Disp: 90 tablet, Rfl: 2    Zinc 25 MG tablet, Take 25 mg by mouth., Disp: , Rfl:       ROS:  Cardiac review of systems positive for edema.    Objective     /67   Pulse 80   Ht 152.4 cm (60\")   Wt 97 kg (213 lb 12.8 oz)   BMI 41.75 kg/m²       General Appearance:   well developed  well nourished  HENT:   oropharynx moist  lips not cyanotic  Respiratory:  no respiratory distress  normal breath sounds  no rales  Cardiovascular:  no jugular venous distention  regular rhythm  S1 normal, S2 normal  no S3, no S4   no murmur  no rub, no thrill  No carotid bruit  pedal pulses normal  lower extremity edema: none    Musculoskeletal:  no clubbing of fingers.   normocephalic, head atraumatic  Skin:   warm, dry  Psychiatric:  judgement and insight appropriate  normal mood and affect    ECHO:  Results for orders placed in visit on 03/17/22    Adult Transthoracic Echo Complete W/ Cont if Necessary Per Protocol    Interpretation Summary  · Left ventricular wall thickness is consistent with mild concentric hypertrophy.  · Estimated left ventricular EF was in agreement with the calculated left ventricular EF. Left ventricular ejection fraction appears to be 61 - 65%. Left ventricular systolic function is normal.  · Left ventricular diastolic function is consistent with (grade I) impaired relaxation.  · Moderate mitral valve regurgitation is " present.  · Estimated right ventricular systolic pressure from tricuspid regurgitation is mildly elevated (35-45 mmHg).  · Mild aortic vakve regurgitation is present.    STRESS:    CATH:  No results found for this or any previous visit.    BMP:     Glucose   Date Value Ref Range Status   02/28/2023 119 (H) 65 - 99 mg/dL Final     BUN   Date Value Ref Range Status   02/28/2023 41 (H) 8 - 23 mg/dL Final     Creatinine   Date Value Ref Range Status   02/28/2023 1.29 (H) 0.57 - 1.00 mg/dL Final     Sodium   Date Value Ref Range Status   02/28/2023 140 136 - 145 mmol/L Final     Potassium   Date Value Ref Range Status   02/28/2023 4.9 3.5 - 5.2 mmol/L Final     Chloride   Date Value Ref Range Status   02/28/2023 98 98 - 107 mmol/L Final     CO2   Date Value Ref Range Status   02/28/2023 31.0 (H) 22.0 - 29.0 mmol/L Final     Calcium   Date Value Ref Range Status   02/28/2023 10.4 8.6 - 10.5 mg/dL Final     BUN/Creatinine Ratio   Date Value Ref Range Status   02/28/2023 31.8 (H) 7.0 - 25.0 Final     Anion Gap   Date Value Ref Range Status   02/28/2023 11.0 5.0 - 15.0 mmol/L Final     eGFR   Date Value Ref Range Status   02/28/2023 40.8 (L) >60.0 mL/min/1.73 Final     LIPIDS:  Total Cholesterol   Date Value Ref Range Status   02/28/2023 132 0 - 200 mg/dL Final     Triglycerides   Date Value Ref Range Status   02/28/2023 151 (H) 0 - 150 mg/dL Final     HDL Cholesterol   Date Value Ref Range Status   02/28/2023 47 40 - 60 mg/dL Final     LDL Cholesterol    Date Value Ref Range Status   02/28/2023 59 0 - 100 mg/dL Final     VLDL Cholesterol   Date Value Ref Range Status   02/28/2023 26 5 - 40 mg/dL Final     LDL/HDL Ratio   Date Value Ref Range Status   02/28/2023 1.17  Final         Procedures             ASSESSMENT:  Diagnoses and all orders for this visit:    1. Essential hypertension (Primary)    2. Hypercholesterolemia    3. Atherosclerosis of native coronary artery of native heart without angina pectoris    4. Edema  leg         PLAN:    1.  Asked patient to continue to watch her salt and keep her feet elevated when she can.  We did talk about the copper fit stockings.  She is going to continue to take the Lasix as she has been doing due to her chronic kidney disease.  2.  Blood pressure is borderline but the last time was under fine control.  We will just continue to monitor.  3.  Continue the pravastatin and Zetia.  Patient had cholesterol checked 2/28/2023 with LDL 59, HDL 47, and triglycerides 151.  These are under excellent control.  4.  Patient had an echocardiogram done 3/2022 which showed mild left ventricular hypertrophy, normal ejection fraction and mild to moderate valvular disease.  Do not think we need to regularly repeat this.  5.  Continue the aspirin the patient notes no problems with bleeding.  6.  Patient had a cath back in 2002 with a possible ostial 40% left main stenosis.  She had otherwise no disease.      Return in about 6 months (around 12/19/2023).     Patient was given instructions and counseling regarding her condition or for health maintenance advice. Please see specific information pulled into the AVS if appropriate.         Cooper Romero MD   6/19/2023  10:02 EDT   99

## 2023-07-31 NOTE — ASSESSMENT & PLAN NOTE
She has become more sedentary over the last 1 year and has not lost any weight.  I encouraged her to walk 5 to 10 minutes 3 times a day and to reduce the carbohydrate content in    .   Spoke with pt to convey stress test results, pt v/u. Pt states she would like her FU moved sooner than because she would like to know what the reason is for her s/s. Writer moved OV sooner as requested.

## 2023-08-05 DIAGNOSIS — E78.00 PURE HYPERCHOLESTEROLEMIA: Chronic | ICD-10-CM

## 2023-08-07 RX ORDER — PRAVASTATIN SODIUM 80 MG/1
TABLET ORAL
Qty: 90 TABLET | Refills: 3 | Status: SHIPPED | OUTPATIENT
Start: 2023-08-07

## 2023-08-17 ENCOUNTER — TELEPHONE (OUTPATIENT)
Dept: INTERNAL MEDICINE | Facility: CLINIC | Age: 88
End: 2023-08-17
Payer: MEDICARE

## 2023-08-17 NOTE — TELEPHONE ENCOUNTER
Caller: WILFRIDO SALDIVAR     Relationship: DAUGHTER    Best call back number: [phone], PATIENT IS UNABLE TO HEAR WELL OVER THE PHONE SO PATIENT IS REQUESTING CALL TO DAUGHTER    What is your medical concern? PATIENT IS HAVING BLEEDING FROM HEMORRHOIDS WHEN USING THE RESTROOM. SHE IS NOT SURE WHAT SHE CAN DO TO HELP SOLVE THE ISSUE. SHE IS REQUESTING CALL TO LET HER KNOW.     How long has this issue been going on? 08/16/2023    Is your provider already aware of this issue? NO    Have you been treated for this issue? NO

## 2023-08-18 ENCOUNTER — OFFICE VISIT (OUTPATIENT)
Dept: INTERNAL MEDICINE | Facility: CLINIC | Age: 88
End: 2023-08-18
Payer: MEDICARE

## 2023-08-18 VITALS
BODY MASS INDEX: 41.54 KG/M2 | HEART RATE: 68 BPM | OXYGEN SATURATION: 93 % | DIASTOLIC BLOOD PRESSURE: 62 MMHG | HEIGHT: 60 IN | RESPIRATION RATE: 18 BRPM | TEMPERATURE: 98.7 F | WEIGHT: 211.6 LBS | SYSTOLIC BLOOD PRESSURE: 142 MMHG

## 2023-08-18 DIAGNOSIS — K64.9 HEMORRHOIDS, UNSPECIFIED HEMORRHOID TYPE: Primary | ICD-10-CM

## 2023-08-18 LAB
BASOPHILS # BLD AUTO: 0.04 10*3/MM3 (ref 0–0.2)
BASOPHILS NFR BLD AUTO: 0.3 % (ref 0–1.5)
DEPRECATED RDW RBC AUTO: 45.2 FL (ref 37–54)
EOSINOPHIL # BLD AUTO: 0.25 10*3/MM3 (ref 0–0.4)
EOSINOPHIL NFR BLD AUTO: 1.9 % (ref 0.3–6.2)
ERYTHROCYTE [DISTWIDTH] IN BLOOD BY AUTOMATED COUNT: 13.3 % (ref 12.3–15.4)
HCT VFR BLD AUTO: 35.3 % (ref 34–46.6)
HGB BLD-MCNC: 11.5 G/DL (ref 12–15.9)
IMM GRANULOCYTES # BLD AUTO: 0.04 10*3/MM3 (ref 0–0.05)
IMM GRANULOCYTES NFR BLD AUTO: 0.3 % (ref 0–0.5)
LYMPHOCYTES # BLD AUTO: 1.97 10*3/MM3 (ref 0.7–3.1)
LYMPHOCYTES NFR BLD AUTO: 15.1 % (ref 19.6–45.3)
MCH RBC QN AUTO: 30 PG (ref 26.6–33)
MCHC RBC AUTO-ENTMCNC: 32.6 G/DL (ref 31.5–35.7)
MCV RBC AUTO: 92.2 FL (ref 79–97)
MONOCYTES # BLD AUTO: 1.1 10*3/MM3 (ref 0.1–0.9)
MONOCYTES NFR BLD AUTO: 8.4 % (ref 5–12)
NEUTROPHILS NFR BLD AUTO: 74 % (ref 42.7–76)
NEUTROPHILS NFR BLD AUTO: 9.67 10*3/MM3 (ref 1.7–7)
NRBC BLD AUTO-RTO: 0 /100 WBC (ref 0–0.2)
PLATELET # BLD AUTO: 290 10*3/MM3 (ref 140–450)
PMV BLD AUTO: 9.8 FL (ref 6–12)
RBC # BLD AUTO: 3.83 10*6/MM3 (ref 3.77–5.28)
WBC NRBC COR # BLD: 13.07 10*3/MM3 (ref 3.4–10.8)

## 2023-08-18 PROCEDURE — 3077F SYST BP >= 140 MM HG: CPT

## 2023-08-18 PROCEDURE — 85025 COMPLETE CBC W/AUTO DIFF WBC: CPT

## 2023-08-18 PROCEDURE — 99213 OFFICE O/P EST LOW 20 MIN: CPT

## 2023-08-18 PROCEDURE — 3078F DIAST BP <80 MM HG: CPT

## 2023-08-18 PROCEDURE — 1159F MED LIST DOCD IN RCRD: CPT

## 2023-08-18 PROCEDURE — 1160F RVW MEDS BY RX/DR IN RCRD: CPT

## 2023-08-18 RX ORDER — HYDROCORTISONE ACETATE 25 MG/1
25 SUPPOSITORY RECTAL 2 TIMES DAILY PRN
Qty: 30 EACH | Refills: 1 | Status: CANCELLED | OUTPATIENT
Start: 2023-08-18

## 2023-08-18 NOTE — ASSESSMENT & PLAN NOTE
She states that she has internal hemorrhoids. She reports that they started bothering her a few days ago. She states that she is bleeding quite a bit. She states it has never been this bad before. She states she has been putting in preparation H suppositories. She states she did not have any blood today.   Will do a trial of proctofoam and TUCKS pads provided.  If no improvement or bleeding gets worse, pt instructed to go to ED.

## 2023-08-18 NOTE — PROGRESS NOTES
Chief Complaint  Hemorrhoids (87 year old female here today complaining of bleeding Hemorrhoids X 3-4 days. States she has internal hemorrhoids )    History of Present Illness  SUBJECTIVE  Devi Diallo presents to [facility] with complaints of hemorrhoids.    Past Medical History:   Diagnosis Date    Arthritis     Atherosclerotic heart disease of native coronary artery without angina pectoris 1/1/2002    cardiac catheterization 2002    Diabetes     Essential hypertension 9/20/2012    Hyperlipemia     Hypertension     Pure hypercholesterolemia 4/25/2014    Reflux esophagitis     Seasonal allergies     Stage 2 chronic kidney disease 9/20/2012      Family History   Problem Relation Age of Onset    Cancer Mother         Unspecified    Arthritis Mother     Diabetes Brother         Unspecified type    Arthritis Brother       Past Surgical History:   Procedure Laterality Date    COLONOSCOPY      EYE SURGERY      Eye Implant    HYSTERECTOMY          Current Outpatient Medications:     albuterol sulfate  (90 Base) MCG/ACT inhaler, Inhale 2 puffs Every 4 (Four) Hours As Needed for Wheezing., Disp: , Rfl:     amLODIPine (NORVASC) 5 MG tablet, TAKE ONE TABLET BY MOUTH DAILY, Disp: 90 tablet, Rfl: 3    Ascorbic Acid (Vitamin C) 500 MG capsule, Take 500 mg by mouth., Disp: , Rfl:     Aspirin 81 MG capsule, Daily., Disp: , Rfl:     Breo Ellipta 200-25 MCG/INH inhaler, Inhale 1 puff Daily., Disp: 60 each, Rfl: 11    carvedilol (COREG) 12.5 MG tablet, TAKE 1 AND 1/2 TABLET BY MOUTH TWO TIMES A DAY, Disp: 270 tablet, Rfl: 1    cetirizine (zyrTEC) 10 MG tablet, Take 1 tablet by mouth Daily., Disp: , Rfl:     chlorhexidine (PERIDEX) 0.12 % solution, , Disp: , Rfl:     cholecalciferol (VITAMIN D3) 25 MCG (1000 UT) tablet, Take 1 tablet by mouth Daily., Disp: , Rfl:     clobetasol (TEMOVATE) 0.05 % cream, APPLY TO AFFECTED AREA(S) TWO TIMES A DAY, Disp: 45 g, Rfl: 3    Cranberry 1000 MG  capsule, Take 1,000 mg by mouth., Disp: , Rfl:     Denta 5000 Plus 1.1 % cream, , Disp: , Rfl:     estradiol (ESTRACE) 0.1 MG/GM vaginal cream, INSERT 2 GRAMS INTO THE VAGINA DAILY (Patient taking differently: Insert 2 g into the vagina 2 (Two) Times a Week.), Disp: 42.5 g, Rfl: 12    ezetimibe (ZETIA) 10 MG tablet, TAKE ONE TABLET BY MOUTH DAILY, Disp: 90 tablet, Rfl: 3    ferrous sulfate 325 (65 FE) MG tablet, Take 1 tablet by mouth Daily With Breakfast., Disp: 90 tablet, Rfl: 3    furosemide (LASIX) 20 MG tablet, TAKE ONE TABLET BY MOUTH TWICE A DAY, Disp: 180 tablet, Rfl: 1    glimepiride (AMARYL) 2 MG tablet, TAKE ONE TABLET BY MOUTH TWICE A DAY (Patient taking differently: 1 tab PO Q AM if needed), Disp: 180 tablet, Rfl: 1    Januvia 50 MG tablet, TAKE ONE TABLET BY MOUTH DAILY, Disp: 90 tablet, Rfl: 3    losartan (COZAAR) 50 MG tablet, Take 1 tablet by mouth 2 (Two) Times a Day., Disp: 180 tablet, Rfl: 2    metFORMIN (GLUCOPHAGE) 850 MG tablet, TAKE ONE TABLET BY MOUTH TWICE A DAY, Disp: 180 tablet, Rfl: 3    montelukast (SINGULAIR) 10 MG tablet, , Disp: , Rfl:     multivitamin with minerals tablet tablet, Take 1 tablet by mouth Daily., Disp: , Rfl:     nitroglycerin (NITROSTAT) 0.4 MG SL tablet, 1 under the tongue as needed for angina, may repeat q5mins for up three doses, Disp: 100 tablet, Rfl: 11    pantoprazole (PROTONIX) 40 MG EC tablet, TAKE ONE TABLET BY MOUTH DAILY, Disp: 90 tablet, Rfl: 3    PARoxetine (PAXIL) 40 MG tablet, Take 1 tablet by mouth Daily., Disp: 90 tablet, Rfl: 3    potassium chloride (MICRO-K) 10 MEQ CR capsule, TAKE ONE CAPSULE BY MOUTH TWICE A DAY WITH FOOD, Disp: 60 capsule, Rfl: 5    pravastatin (PRAVACHOL) 80 MG tablet, TAKE ONE TABLET BY MOUTH DAILY, Disp: 90 tablet, Rfl: 3    Zinc 25 MG tablet, Take 25 mg by mouth., Disp: , Rfl:     Hydrocort-Pramoxine, Perianal, (PROCTOFOAM-HS) 1-1 % rectal foam, Insert 1 application  into the rectum 2 (Two) Times a Day for 15 days., Disp: 30  "g, Rfl: 0    witch hazel-glycerin (TUCKS) pad, Insert  into the rectum As Needed for Irritation or Hemorrhoids., Disp: 100 each, Rfl: 12    OBJECTIVE  Vital Signs:   /62 (BP Location: Left arm, Patient Position: Sitting)   Pulse 68   Temp 98.7 øF (37.1 øC) (Temporal)   Resp 18   Ht 152.4 cm (60\")   Wt 96 kg (211 lb 9.6 oz)   SpO2 93%   BMI 41.33 kg/mý    Estimated body mass index is 41.33 kg/mý as calculated from the following:    Height as of this encounter: 152.4 cm (60\").    Weight as of this encounter: 96 kg (211 lb 9.6 oz).     Wt Readings from Last 3 Encounters:   08/18/23 96 kg (211 lb 9.6 oz)   06/19/23 97 kg (213 lb 12.8 oz)   03/01/23 95.1 kg (209 lb 9.6 oz)     BP Readings from Last 3 Encounters:   08/18/23 142/62   06/19/23 144/67   03/01/23 133/69       Physical Exam  Vitals and nursing note reviewed.   Constitutional:       Appearance: Normal appearance.   HENT:      Head: Normocephalic.   Eyes:      Extraocular Movements: Extraocular movements intact.      Conjunctiva/sclera: Conjunctivae normal.   Cardiovascular:      Rate and Rhythm: Normal rate and regular rhythm.      Heart sounds: Normal heart sounds.   Pulmonary:      Effort: Pulmonary effort is normal.      Breath sounds: Normal breath sounds.   Abdominal:      General: Bowel sounds are normal.      Palpations: Abdomen is soft.      Tenderness: There is no abdominal tenderness. There is no guarding.   Musculoskeletal:         General: No swelling. Normal range of motion.      Cervical back: Normal range of motion and neck supple.   Skin:     General: Skin is warm and dry.   Neurological:      General: No focal deficit present.      Mental Status: She is alert and oriented to person, place, and time. Mental status is at baseline.   Psychiatric:         Mood and Affect: Mood normal.         Thought Content: Thought content normal.         Judgment: Judgment normal.        Result Review        No Images in the past 120 days found.. "     The above data has been reviewed by WALTER Chavez 08/18/2023 10:21 EDT.          Patient Care Team:  Jacobo Redmond MD as PCP - General (Internal Medicine)  Teena Knapp MD as Consulting Physician (Obstetrics and Gynecology)           ASSESSMENT & PLAN    Diagnoses and all orders for this visit:    1. Hemorrhoids, unspecified hemorrhoid type (Primary)  Assessment & Plan:  She states that she has internal hemorrhoids. She reports that they started bothering her a few days ago. She states that she is bleeding quite a bit. She states it has never been this bad before. She states she has been putting in preparation H suppositories. She states she did not have any blood today.   Will do a trial of proctofoam and TUCKS pads provided.  If no improvement or bleeding gets worse, pt instructed to go to ED.    Orders:  -     witch hazel-glycerin (TUCKS) pad; Insert  into the rectum As Needed for Irritation or Hemorrhoids.  Dispense: 100 each; Refill: 12  -     CBC w AUTO Differential; Future  -     Hydrocort-Pramoxine, Perianal, (PROCTOFOAM-HS) 1-1 % rectal foam; Insert 1 application  into the rectum 2 (Two) Times a Day for 15 days.  Dispense: 30 g; Refill: 0  -     Cancel: CBC w AUTO Differential  -     CBC w AUTO Differential; Future         Tobacco Use: Low Risk     Smoking Tobacco Use: Never    Smokeless Tobacco Use: Never    Passive Exposure: Not on file       Follow Up     Return if symptoms worsen or fail to improve.    Please note that portions of this note were completed with a voice recognition program.    Patient was given instructions and counseling regarding her condition or for health maintenance advice. Please see specific information pulled into the AVS if appropriate.   I have reviewed information obtained and documented by others and I have confirmed the accuracy of this documented note.    WALTER Chavez

## 2023-08-22 ENCOUNTER — OFFICE VISIT (OUTPATIENT)
Dept: PULMONOLOGY | Facility: CLINIC | Age: 88
End: 2023-08-22
Payer: MEDICARE

## 2023-08-22 VITALS
WEIGHT: 213 LBS | TEMPERATURE: 98.7 F | HEIGHT: 60 IN | HEART RATE: 71 BPM | SYSTOLIC BLOOD PRESSURE: 133 MMHG | BODY MASS INDEX: 41.82 KG/M2 | RESPIRATION RATE: 18 BRPM | OXYGEN SATURATION: 92 % | DIASTOLIC BLOOD PRESSURE: 40 MMHG

## 2023-08-22 DIAGNOSIS — J42 CHRONIC BRONCHITIS, UNSPECIFIED CHRONIC BRONCHITIS TYPE: ICD-10-CM

## 2023-08-22 DIAGNOSIS — J45.40 MODERATE PERSISTENT ASTHMA WITHOUT COMPLICATION: Primary | ICD-10-CM

## 2023-08-22 DIAGNOSIS — E66.01 MORBID OBESITY WITH BMI OF 40.0-44.9, ADULT: ICD-10-CM

## 2023-08-22 DIAGNOSIS — J30.2 SEASONAL ALLERGIC RHINITIS, UNSPECIFIED TRIGGER: ICD-10-CM

## 2023-08-22 DIAGNOSIS — R06.09 DYSPNEA ON EXERTION: ICD-10-CM

## 2023-08-22 RX ORDER — ALBUTEROL SULFATE 90 UG/1
2 AEROSOL, METERED RESPIRATORY (INHALATION) EVERY 4 HOURS PRN
Qty: 18 G | Refills: 11 | Status: SHIPPED | OUTPATIENT
Start: 2023-08-22

## 2023-08-22 RX ORDER — FLUTICASONE FUROATE AND VILANTEROL 200; 25 UG/1; UG/1
1 POWDER RESPIRATORY (INHALATION)
Qty: 60 EACH | Refills: 11 | Status: SHIPPED | OUTPATIENT
Start: 2023-08-22

## 2023-08-22 NOTE — PROGRESS NOTES
Primary Care Provider  Jacobo Redmond MD     Referring Provider  No ref. provider found     Chief Complaint  Shortness of Breath (With exertion ), Follow-up (6 month f/up ), and Asthma    Subjective          Devi Diallo presents to [facility]  History of Present Illness  Devi Diallo is a 87 y.o. female patient of Dr. Timmons here for management of chronic bronchitis, moderate persistent asthma, seasonal allergies and dyspnea on exertion.     Patient states she is doing well since her last visit.  She denies using antibiotics or steroids for her lungs.  She denies any fevers or chills.  Patient states her shortness of breath is mild in severity, worse with exertion and improved with rest.  She continues to use Breo as prescribed.  She will intermittently use albuterol twice a week.  She continues to use Singulair and Zyrtec for allergies.  Of note, patient is 85% on room air upon entry into the exam room.  She does not want oxygen at this time.  Patient did rebound to the low 90s very quickly.  Overall, she is doing well and has no additional concerns at this time.  She is able to perform her ADLs without difficulty.  She is up-to-date with her COVID and pneumonia vaccine.  She will be due for a flu vaccine this fall.     Her history of smoking is   Tobacco Use: Low Risk     Smoking Tobacco Use: Never    Smokeless Tobacco Use: Never    Passive Exposure: Never   .    Review of Systems   Constitutional:  Negative for chills, fatigue, fever, unexpected weight gain and unexpected weight loss.   HENT:  Negative for congestion (Nasal), hearing loss, mouth sores, nosebleeds, postnasal drip, sore throat and trouble swallowing.    Respiratory:  Positive for shortness of breath. Negative for apnea, cough and wheezing.         Negative for Hemoptysis     Cardiovascular:  Negative for chest pain, palpitations and leg swelling.   Gastrointestinal:  Negative for  constipation, diarrhea, nausea, vomiting and GERD.   Skin:  Negative for color change.        Negative for cyanosis   Neurological:  Negative for syncope, weakness, numbness and headache.    Sleep: Negative for Excessive daytime sleepiness  Negative for morning headaches  Negative for Snoring    Family History   Problem Relation Age of Onset    Cancer Mother         Unspecified    Arthritis Mother     Diabetes Brother         Unspecified type    Arthritis Brother         Social History     Socioeconomic History    Marital status:    Tobacco Use    Smoking status: Never     Passive exposure: Never    Smokeless tobacco: Never   Vaping Use    Vaping Use: Never used   Substance and Sexual Activity    Alcohol use: Never    Drug use: Never    Sexual activity: Defer        Past Medical History:   Diagnosis Date    Arthritis     Atherosclerotic heart disease of native coronary artery without angina pectoris 1/1/2002    cardiac catheterization 2002    Diabetes     Essential hypertension 9/20/2012    Hyperlipemia     Hypertension     Pure hypercholesterolemia 4/25/2014    Reflux esophagitis     Seasonal allergies     Stage 2 chronic kidney disease 9/20/2012        Immunization History   Administered Date(s) Administered    COVID-19 (MODERNA) 1st,2nd,3rd Dose Monovalent 02/18/2021, 03/19/2021    Fluzone High Dose =>65 Years ([employer] ONLY) 10/16/2020    Fluzone High-Dose 65+yrs 10/08/2021, 10/05/2022    Pneumococcal Conjugate 13-Valent (PCV13) 09/01/2016    Pneumococcal Polysaccharide (PPSV23) 09/01/2019    Pneumococcal, Unspecified 10/20/2019, 10/20/2019         Allergies   Allergen Reactions    Amoxicillin-Pot Clavulanate Hives and Unknown - Low Severity    Cipro [Ciprofloxacin Hcl] Hives    Ciprofloxacin Unknown - Low Severity    Clindamycin Unknown - Low Severity    Clindamycin/Lincomycin Rash    Macrobid [Nitrofurantoin] Hives    Penicillins Hives    Sulfamethoxazole-Trimethoprim Nausea Only and Unknown - Low  Severity          Current Outpatient Medications:     albuterol sulfate  (90 Base) MCG/ACT inhaler, Inhale 2 puffs Every 4 (Four) Hours As Needed for Wheezing., Disp: 18 g, Rfl: 11    amLODIPine (NORVASC) 5 MG tablet, TAKE ONE TABLET BY MOUTH DAILY, Disp: 90 tablet, Rfl: 3    Ascorbic Acid (Vitamin C) 500 MG capsule, Take 500 mg by mouth., Disp: , Rfl:     Aspirin 81 MG capsule, Daily., Disp: , Rfl:     carvedilol (COREG) 12.5 MG tablet, TAKE 1 AND 1/2 TABLET BY MOUTH TWO TIMES A DAY, Disp: 270 tablet, Rfl: 1    cetirizine (zyrTEC) 10 MG tablet, Take 1 tablet by mouth Daily., Disp: , Rfl:     chlorhexidine (PERIDEX) 0.12 % solution, , Disp: , Rfl:     cholecalciferol (VITAMIN D3) 25 MCG (1000 UT) tablet, Take 1 tablet by mouth Daily., Disp: , Rfl:     clobetasol (TEMOVATE) 0.05 % cream, APPLY TO AFFECTED AREA(S) TWO TIMES A DAY, Disp: 45 g, Rfl: 3    Cranberry 1000 MG capsule, Take 1,000 mg by mouth., Disp: , Rfl:     Denta 5000 Plus 1.1 % cream, , Disp: , Rfl:     estradiol (ESTRACE) 0.1 MG/GM vaginal cream, INSERT 2 GRAMS INTO THE VAGINA DAILY (Patient taking differently: Insert 2 g into the vagina 2 (Two) Times a Week.), Disp: 42.5 g, Rfl: 12    ezetimibe (ZETIA) 10 MG tablet, TAKE ONE TABLET BY MOUTH DAILY, Disp: 90 tablet, Rfl: 3    ferrous sulfate 325 (65 FE) MG tablet, Take 1 tablet by mouth Daily With Breakfast., Disp: 90 tablet, Rfl: 3    Fluticasone Furoate-Vilanterol (Breo Ellipta) 200-25 MCG/ACT inhaler, Inhale 1 puff Daily., Disp: 60 each, Rfl: 11    furosemide (LASIX) 20 MG tablet, TAKE ONE TABLET BY MOUTH TWICE A DAY, Disp: 180 tablet, Rfl: 1    glimepiride (AMARYL) 2 MG tablet, TAKE ONE TABLET BY MOUTH TWICE A DAY (Patient taking differently: 1 tab PO Q AM if needed), Disp: 180 tablet, Rfl: 1    Hydrocort-Pramoxine, Perianal, (PROCTOFOAM-HS) 1-1 % rectal foam, Insert 1 application  into the rectum 2 (Two) Times a Day for 15 days., Disp: 30 g, Rfl: 0    Januvia 50 MG tablet, TAKE ONE TABLET BY  MOUTH DAILY, Disp: 90 tablet, Rfl: 3    losartan (COZAAR) 50 MG tablet, Take 1 tablet by mouth 2 (Two) Times a Day., Disp: 180 tablet, Rfl: 2    metFORMIN (GLUCOPHAGE) 850 MG tablet, TAKE ONE TABLET BY MOUTH TWICE A DAY, Disp: 180 tablet, Rfl: 3    montelukast (SINGULAIR) 10 MG tablet, , Disp: , Rfl:     multivitamin with minerals tablet tablet, Take 1 tablet by mouth Daily., Disp: , Rfl:     nitroglycerin (NITROSTAT) 0.4 MG SL tablet, 1 under the tongue as needed for angina, may repeat q5mins for up three doses, Disp: 100 tablet, Rfl: 11    nystatin-triamcinolone (MYCOLOG II) 388657-9.1 UNIT/GM-% cream, by Other route See Admin Instructions., Disp: , Rfl:     pantoprazole (PROTONIX) 40 MG EC tablet, TAKE ONE TABLET BY MOUTH DAILY, Disp: 90 tablet, Rfl: 3    PARoxetine (PAXIL) 40 MG tablet, Take 1 tablet by mouth Daily., Disp: 90 tablet, Rfl: 3    potassium chloride (MICRO-K) 10 MEQ CR capsule, TAKE ONE CAPSULE BY MOUTH TWICE A DAY WITH FOOD, Disp: 60 capsule, Rfl: 5    pravastatin (PRAVACHOL) 80 MG tablet, TAKE ONE TABLET BY MOUTH DAILY, Disp: 90 tablet, Rfl: 3    witch hazel-glycerin (TUCKS) pad, Insert  into the rectum As Needed for Irritation or Hemorrhoids., Disp: 100 each, Rfl: 12    Zinc 25 MG tablet, Take 25 mg by mouth., Disp: , Rfl:      Objective   Physical Exam  Constitutional:       General: She is not in acute distress.     Appearance: Normal appearance. She is normal weight. She is obese.   HENT:      Right Ear: Hearing normal.      Left Ear: Hearing normal.      Nose: No nasal tenderness or congestion.      Mouth/Throat:      Mouth: Mucous membranes are moist. No oral lesions.   Eyes:      Extraocular Movements: Extraocular movements intact.      Pupils: Pupils are equal, round, and reactive to light.   Neck:      Thyroid: No thyroid mass or thyromegaly.   Cardiovascular:      Rate and Rhythm: Normal rate and regular rhythm.      Pulses: Normal pulses.      Heart sounds: Normal heart sounds. No  "murmur heard.  Pulmonary:      Effort: Pulmonary effort is normal.      Breath sounds: Normal breath sounds. Decreased breath sounds present. No wheezing, rhonchi or rales.   Musculoskeletal:      Cervical back: Neck supple.      Right lower leg: No edema.      Left lower leg: No edema.   Lymphadenopathy:      Cervical: No cervical adenopathy.      Upper Body:      Right upper body: No axillary adenopathy.   Skin:     General: Skin is warm and dry.      Findings: No lesion or rash.   Neurological:      General: No focal deficit present.      Mental Status: She is alert and oriented to person, place, and time.   Psychiatric:         Mood and Affect: Affect normal. Mood is not anxious or depressed.       Vital Signs:   /40 (BP Location: Right arm, Patient Position: Sitting, Cuff Size: Large Adult)   Pulse 71   Temp 98.7 øF (37.1 øC) (Temporal)   Resp 18   Ht 152.4 cm (60\")   Wt 96.6 kg (213 lb)   SpO2 92% Comment: room air  BMI 41.60 kg/mý        Result Review :   The following data was reviewed by: WALTER Lam on 08/22/2023:  CMP          9/19/2022    09:01 2/28/2023    08:52   CMP   Glucose 143  119    BUN 35  41    Creatinine 1.24  1.29    EGFR 43.0  40.8    Sodium 134  140    Potassium 5.2  4.9    Chloride 98  98    Calcium 9.8  10.4    Total Protein 6.9  7.6    Albumin 4.20  4.2    Globulin 2.7  3.4    Total Bilirubin 0.3  0.4    Alkaline Phosphatase 71  65    AST (SGOT) 12  13    ALT (SGPT) 12  10    Albumin/Globulin Ratio 1.6  1.2    BUN/Creatinine Ratio 28.2  31.8    Anion Gap 9.0  11.0      CBC w/diff          2/28/2023    08:52 8/18/2023    15:34   CBC w/Diff   WBC 11.63  13.07    RBC 4.23  3.83    Hemoglobin 12.3  11.5    Hematocrit 38.1  35.3    MCV 90.1  92.2    MCH 29.1  30.0    MCHC 32.3  32.6    RDW 13.7  13.3    Platelets 286  290    Neutrophil Rel % 75.5  74.0    Immature Granulocyte Rel % 0.3  0.3    Lymphocyte Rel % 13.5  15.1    Monocyte Rel % 7.2  8.4    Eosinophil Rel % 3.2 "  1.9    Basophil Rel % 0.3  0.3      Data reviewed : Radiologic studies chest CT 7/15/2019, chest x-ray 11/17/2021, pulmonary function test 5/16/2019 and my last office note    Procedures        Assessment and Plan    Diagnoses and all orders for this visit:    1. Moderate persistent asthma without complication (Primary)  -     albuterol sulfate  (90 Base) MCG/ACT inhaler; Inhale 2 puffs Every 4 (Four) Hours As Needed for Wheezing.  Dispense: 18 g; Refill: 11  -     Fluticasone Furoate-Vilanterol (Breo Ellipta) 200-25 MCG/ACT inhaler; Inhale 1 puff Daily.  Dispense: 60 each; Refill: 11    2. Chronic bronchitis, unspecified chronic bronchitis type  -     albuterol sulfate  (90 Base) MCG/ACT inhaler; Inhale 2 puffs Every 4 (Four) Hours As Needed for Wheezing.  Dispense: 18 g; Refill: 11  -     Fluticasone Furoate-Vilanterol (Breo Ellipta) 200-25 MCG/ACT inhaler; Inhale 1 puff Daily.  Dispense: 60 each; Refill: 11    3. Seasonal allergic rhinitis, unspecified trigger    4. Dyspnea on exertion  -     albuterol sulfate  (90 Base) MCG/ACT inhaler; Inhale 2 puffs Every 4 (Four) Hours As Needed for Wheezing.  Dispense: 18 g; Refill: 11    5. Morbid obesity with BMI of 40.0-44.9, adult    6.  Continue Breo as prescribed.  Rinse mouth out after each use.  7.  Continue albuterol as needed.  8.  Continue Zyrtec and Singulair.  9.  Encourage patient try to stay as active as possible.  Recommend 30 minutes of daily activity.  10.  Follow-up with Viviana in 6 months, sooner if needed.        Follow Up   Return in about 6 months (around 2/22/2024) for Recheck with Viviana.  Patient was given instructions and counseling regarding her condition or for health maintenance advice. Please see specific information pulled into the AVS if appropriate.

## 2023-08-28 ENCOUNTER — TELEPHONE (OUTPATIENT)
Dept: INTERNAL MEDICINE | Facility: CLINIC | Age: 88
End: 2023-08-28
Payer: MEDICARE

## 2023-08-28 NOTE — TELEPHONE ENCOUNTER
Pharmacy called office stating  the proctofoam was going to be $500 for patient they ask to switch to cream give the ok.

## 2023-08-31 ENCOUNTER — LAB (OUTPATIENT)
Dept: LAB | Facility: HOSPITAL | Age: 88
End: 2023-08-31
Payer: MEDICARE

## 2023-08-31 DIAGNOSIS — J30.2 SEASONAL ALLERGIC RHINITIS, UNSPECIFIED TRIGGER: ICD-10-CM

## 2023-08-31 DIAGNOSIS — D50.9 IRON DEFICIENCY ANEMIA, UNSPECIFIED IRON DEFICIENCY ANEMIA TYPE: ICD-10-CM

## 2023-08-31 DIAGNOSIS — N39.41 URGE INCONTINENCE OF URINE: ICD-10-CM

## 2023-08-31 DIAGNOSIS — I25.10 ATHEROSCLEROSIS OF NATIVE CORONARY ARTERY OF NATIVE HEART WITHOUT ANGINA PECTORIS: ICD-10-CM

## 2023-08-31 DIAGNOSIS — E66.01 CLASS 3 SEVERE OBESITY DUE TO EXCESS CALORIES WITH SERIOUS COMORBIDITY AND BODY MASS INDEX (BMI) OF 40.0 TO 44.9 IN ADULT: ICD-10-CM

## 2023-08-31 DIAGNOSIS — I34.0 NONRHEUMATIC MITRAL VALVE REGURGITATION: ICD-10-CM

## 2023-08-31 DIAGNOSIS — I10 ESSENTIAL HYPERTENSION: ICD-10-CM

## 2023-08-31 DIAGNOSIS — D72.9 WHITE BLOOD CELL DISORDER: ICD-10-CM

## 2023-08-31 DIAGNOSIS — N18.31 STAGE 3A CHRONIC KIDNEY DISEASE: ICD-10-CM

## 2023-08-31 DIAGNOSIS — E11.9 TYPE 2 DIABETES MELLITUS WITHOUT COMPLICATION, WITHOUT LONG-TERM CURRENT USE OF INSULIN: ICD-10-CM

## 2023-08-31 DIAGNOSIS — K21.9 GASTRO-ESOPHAGEAL REFLUX DISEASE WITHOUT ESOPHAGITIS: ICD-10-CM

## 2023-08-31 LAB
ALBUMIN SERPL-MCNC: 4.2 G/DL (ref 3.5–5.2)
ALBUMIN UR-MCNC: 5.3 MG/DL
ALBUMIN/GLOB SERPL: 1.4 G/DL
ALP SERPL-CCNC: 62 U/L (ref 39–117)
ALT SERPL W P-5'-P-CCNC: 12 U/L (ref 1–33)
ANION GAP SERPL CALCULATED.3IONS-SCNC: 9.3 MMOL/L (ref 5–15)
AST SERPL-CCNC: 11 U/L (ref 1–32)
BASOPHILS # BLD AUTO: 0.03 10*3/MM3 (ref 0–0.2)
BASOPHILS NFR BLD AUTO: 0.2 % (ref 0–1.5)
BILIRUB SERPL-MCNC: 0.4 MG/DL (ref 0–1.2)
BUN SERPL-MCNC: 29 MG/DL (ref 8–23)
BUN/CREAT SERPL: 25.7 (ref 7–25)
CALCIUM SPEC-SCNC: 10 MG/DL (ref 8.6–10.5)
CHLORIDE SERPL-SCNC: 104 MMOL/L (ref 98–107)
CHOLEST SERPL-MCNC: 119 MG/DL (ref 0–200)
CO2 SERPL-SCNC: 24.7 MMOL/L (ref 22–29)
CREAT SERPL-MCNC: 1.13 MG/DL (ref 0.57–1)
DEPRECATED RDW RBC AUTO: 43.6 FL (ref 37–54)
EGFRCR SERPLBLD CKD-EPI 2021: 47.8 ML/MIN/1.73
EOSINOPHIL # BLD AUTO: 0.25 10*3/MM3 (ref 0–0.4)
EOSINOPHIL NFR BLD AUTO: 2 % (ref 0.3–6.2)
ERYTHROCYTE [DISTWIDTH] IN BLOOD BY AUTOMATED COUNT: 13.2 % (ref 12.3–15.4)
GLOBULIN UR ELPH-MCNC: 3 GM/DL
GLUCOSE SERPL-MCNC: 131 MG/DL (ref 65–99)
HBA1C MFR BLD: 6.7 % (ref 4.8–5.6)
HCT VFR BLD AUTO: 34.7 % (ref 34–46.6)
HDLC SERPL-MCNC: 48 MG/DL (ref 40–60)
HGB BLD-MCNC: 11.2 G/DL (ref 12–15.9)
IMM GRANULOCYTES # BLD AUTO: 0.06 10*3/MM3 (ref 0–0.05)
IMM GRANULOCYTES NFR BLD AUTO: 0.5 % (ref 0–0.5)
LDLC SERPL CALC-MCNC: 50 MG/DL (ref 0–100)
LDLC/HDLC SERPL: 1 {RATIO}
LYMPHOCYTES # BLD AUTO: 1.53 10*3/MM3 (ref 0.7–3.1)
LYMPHOCYTES NFR BLD AUTO: 12.3 % (ref 19.6–45.3)
MCH RBC QN AUTO: 29.5 PG (ref 26.6–33)
MCHC RBC AUTO-ENTMCNC: 32.3 G/DL (ref 31.5–35.7)
MCV RBC AUTO: 91.3 FL (ref 79–97)
MONOCYTES # BLD AUTO: 0.86 10*3/MM3 (ref 0.1–0.9)
MONOCYTES NFR BLD AUTO: 6.9 % (ref 5–12)
NEUTROPHILS NFR BLD AUTO: 78.1 % (ref 42.7–76)
NEUTROPHILS NFR BLD AUTO: 9.73 10*3/MM3 (ref 1.7–7)
NRBC BLD AUTO-RTO: 0 /100 WBC (ref 0–0.2)
PLATELET # BLD AUTO: 285 10*3/MM3 (ref 140–450)
PMV BLD AUTO: 9.3 FL (ref 6–12)
POTASSIUM SERPL-SCNC: 5.5 MMOL/L (ref 3.5–5.2)
PROT SERPL-MCNC: 7.2 G/DL (ref 6–8.5)
RBC # BLD AUTO: 3.8 10*6/MM3 (ref 3.77–5.28)
SODIUM SERPL-SCNC: 138 MMOL/L (ref 136–145)
T4 FREE SERPL-MCNC: 1.33 NG/DL (ref 0.93–1.7)
TRIGL SERPL-MCNC: 114 MG/DL (ref 0–150)
TSH SERPL DL<=0.05 MIU/L-ACNC: 2.32 UIU/ML (ref 0.27–4.2)
VLDLC SERPL-MCNC: 21 MG/DL (ref 5–40)
WBC NRBC COR # BLD: 12.46 10*3/MM3 (ref 3.4–10.8)

## 2023-08-31 PROCEDURE — 85025 COMPLETE CBC W/AUTO DIFF WBC: CPT

## 2023-08-31 PROCEDURE — 80053 COMPREHEN METABOLIC PANEL: CPT

## 2023-08-31 PROCEDURE — 82043 UR ALBUMIN QUANTITATIVE: CPT

## 2023-08-31 PROCEDURE — 36415 COLL VENOUS BLD VENIPUNCTURE: CPT

## 2023-08-31 PROCEDURE — 84439 ASSAY OF FREE THYROXINE: CPT

## 2023-08-31 PROCEDURE — 83036 HEMOGLOBIN GLYCOSYLATED A1C: CPT

## 2023-08-31 PROCEDURE — 84443 ASSAY THYROID STIM HORMONE: CPT

## 2023-08-31 PROCEDURE — 80061 LIPID PANEL: CPT

## 2023-09-05 ENCOUNTER — OFFICE VISIT (OUTPATIENT)
Dept: INTERNAL MEDICINE | Facility: CLINIC | Age: 88
End: 2023-09-05
Payer: MEDICARE

## 2023-09-05 VITALS
HEART RATE: 80 BPM | TEMPERATURE: 97.8 F | HEIGHT: 60 IN | DIASTOLIC BLOOD PRESSURE: 65 MMHG | WEIGHT: 214 LBS | OXYGEN SATURATION: 93 % | BODY MASS INDEX: 42.01 KG/M2 | SYSTOLIC BLOOD PRESSURE: 152 MMHG

## 2023-09-05 DIAGNOSIS — Z00.00 MEDICARE ANNUAL WELLNESS VISIT, SUBSEQUENT: Primary | ICD-10-CM

## 2023-09-05 DIAGNOSIS — E11.9 TYPE 2 DIABETES MELLITUS WITHOUT COMPLICATION, WITHOUT LONG-TERM CURRENT USE OF INSULIN: ICD-10-CM

## 2023-09-05 DIAGNOSIS — K21.9 GASTRO-ESOPHAGEAL REFLUX DISEASE WITHOUT ESOPHAGITIS: ICD-10-CM

## 2023-09-05 DIAGNOSIS — D72.9 WHITE BLOOD CELL DISORDER: ICD-10-CM

## 2023-09-05 DIAGNOSIS — N18.31 STAGE 3A CHRONIC KIDNEY DISEASE: ICD-10-CM

## 2023-09-05 DIAGNOSIS — I10 ESSENTIAL HYPERTENSION: Chronic | ICD-10-CM

## 2023-09-05 NOTE — PROGRESS NOTES
"CHIEF COMPLAINT/ HPI:  Diabetes (Routine follow up, Lab follow up. No concerns at this time. )    Recent hemorrhoidal issues, treated with topical creams improved,    Low O2 sats at times, patient has not wanted to take oxygen yet,          Objective   Vital Signs  Vitals:    09/05/23 1108   BP: 152/65   Pulse: 80   Temp: 97.8 °F (36.6 °C)   SpO2: 93%   Weight: 97.1 kg (214 lb)   Height: 152.4 cm (60\")      Body mass index is 41.79 kg/m².  Review of Systems   Constitutional: Negative.    HENT: Negative.     Eyes: Negative.    Respiratory: Negative.     Cardiovascular: Negative.    Gastrointestinal: Negative.    Endocrine: Negative.    Genitourinary: Negative.    Musculoskeletal: Negative.    Allergic/Immunologic: Negative.    Neurological: Negative.    Hematological: Negative.    Psychiatric/Behavioral: Negative.      Physical Exam  Constitutional:       General: She is not in acute distress.     Appearance: Normal appearance. She is obese.   HENT:      Head: Normocephalic.      Mouth/Throat:      Mouth: Mucous membranes are moist.   Eyes:      Conjunctiva/sclera: Conjunctivae normal.      Pupils: Pupils are equal, round, and reactive to light.   Cardiovascular:      Rate and Rhythm: Normal rate and regular rhythm.      Pulses: Normal pulses.      Heart sounds: Murmur (1/6 systolic murmur, left upper right upper sternal border) heard.   Pulmonary:      Effort: Pulmonary effort is normal.      Breath sounds: Rales (Inspiratory fine crackles diffusely posterior,) present.   Abdominal:      General: Bowel sounds are normal.      Palpations: Abdomen is soft.   Musculoskeletal:         General: No swelling. Normal range of motion.      Cervical back: Neck supple.   Skin:     General: Skin is warm and dry.      Coloration: Skin is not jaundiced.   Neurological:      General: No focal deficit present.      Mental Status: She is alert and oriented to person, place, and time. Mental status is at baseline.   Psychiatric:    "      Mood and Affect: Mood normal.         Behavior: Behavior normal.         Thought Content: Thought content normal.         Judgment: Judgment normal.      Result Review :   Lab Results   Component Value Date    PROBNP 77.4 10/26/2021    PROBNP 72.2 06/14/2021    BNP 52 04/16/2019     CMP          9/19/2022    09:01 2/28/2023    08:52 8/31/2023    09:23   CMP   Glucose 143  119  131    BUN 35  41  29    Creatinine 1.24  1.29  1.13    EGFR 43.0  40.8  47.8    Sodium 134  140  138    Potassium 5.2  4.9  5.5    Chloride 98  98  104    Calcium 9.8  10.4  10.0    Total Protein 6.9  7.6  7.2    Albumin 4.20  4.2  4.2    Globulin 2.7  3.4  3.0    Total Bilirubin 0.3  0.4  0.4    Alkaline Phosphatase 71  65  62    AST (SGOT) 12  13  11    ALT (SGPT) 12  10  12    Albumin/Globulin Ratio 1.6  1.2  1.4    BUN/Creatinine Ratio 28.2  31.8  25.7    Anion Gap 9.0  11.0  9.3      CBC w/diff          2/28/2023    08:52 8/18/2023    15:34 8/31/2023    09:23   CBC w/Diff   WBC 11.63  13.07  12.46    RBC 4.23  3.83  3.80    Hemoglobin 12.3  11.5  11.2    Hematocrit 38.1  35.3  34.7    MCV 90.1  92.2  91.3    MCH 29.1  30.0  29.5    MCHC 32.3  32.6  32.3    RDW 13.7  13.3  13.2    Platelets 286  290  285    Neutrophil Rel % 75.5  74.0  78.1    Immature Granulocyte Rel % 0.3  0.3  0.5    Lymphocyte Rel % 13.5  15.1  12.3    Monocyte Rel % 7.2  8.4  6.9    Eosinophil Rel % 3.2  1.9  2.0    Basophil Rel % 0.3  0.3  0.2       Lipid Panel          9/19/2022    09:01 2/28/2023    08:52 8/31/2023    09:23   Lipid Panel   Total Cholesterol 110  132  119    Triglycerides 124  151  114    HDL Cholesterol 42  47  48    VLDL Cholesterol 22  26  21    LDL Cholesterol  46  59  50    LDL/HDL Ratio 1.03  1.17  1.00       Lab Results   Component Value Date    TSH 2.320 08/31/2023    TSH 2.590 11/16/2020    TSH 2.860 11/05/2019      Lab Results   Component Value Date    FREET4 1.33 08/31/2023    FREET4 1.2 08/15/2019      A1C Last 3 Results           2/28/2023    08:52 8/31/2023    09:23   HGBA1C Last 3 Results   Hemoglobin A1C 6.20  6.70                        Visit Diagnoses:    ICD-10-CM ICD-9-CM   1. Medicare annual wellness visit, subsequent  Z00.00 V70.0   2. Stage 3a chronic kidney disease  N18.31 585.3   3. Essential hypertension  I10 401.9   4. Type 2 diabetes mellitus without complication, without long-term current use of insulin  E11.9 250.00   5. White blood cell disorder  D72.9 288.9   6. Gastro-esophageal reflux disease without esophagitis  K21.9 530.81       Assessment and Plan   Diagnoses and all orders for this visit:    1. Medicare annual wellness visit, subsequent (Primary)  -     CBC & Differential; Future  -     Comprehensive Metabolic Panel; Future  -     Lipid Panel; Future  -     Hemoglobin A1c; Future    2. Stage 3a chronic kidney disease  -     CBC & Differential; Future  -     Comprehensive Metabolic Panel; Future  -     Lipid Panel; Future  -     Hemoglobin A1c; Future    3. Essential hypertension  -     CBC & Differential; Future  -     Comprehensive Metabolic Panel; Future  -     Lipid Panel; Future  -     Hemoglobin A1c; Future    4. Type 2 diabetes mellitus without complication, without long-term current use of insulin  -     CBC & Differential; Future  -     Comprehensive Metabolic Panel; Future  -     Lipid Panel; Future  -     Hemoglobin A1c; Future    5. White blood cell disorder  -     CBC & Differential; Future  -     Comprehensive Metabolic Panel; Future  -     Lipid Panel; Future  -     Hemoglobin A1c; Future    6. Gastro-esophageal reflux disease without esophagitis  -     CBC & Differential; Future  -     Comprehensive Metabolic Panel; Future  -     Lipid Panel; Future  -     Hemoglobin A1c; Future             lower extremity edema ---continue Lasix 20 mg daily, as needed,    Hyperkalemia mild ---recommend decrease potassium to 1 a day August 29, 2022--- recommend stopping potassium completely even if she takes a water  pill, discussed September 5, 2023    respiratory infection cough wheezing asthma exacerbations ----CT scan of the chest, Chest x-ray shows questionable infiltrate versus fullness in the right hilum right lung field,--- SEEING DR BANKS --- continues CARLY CHIU, NOV 2019, ---PULM NOD. ON CT APRIL 2019, REPEATED CT ABD/PELVIS AND CHEST ---JUNE 2019,      AND THEN MRI OF ABD FOR RENAL MASS----- JULY, 2019,     ELEVATED WBC CHRONIC , STABLE - since NOV 2020, till now August 2023 -------DID SEE DR BUCIO, NO RX     ALLERGRIES , CONT ZYRTEC AND SINGULAIR    HTN -- cont NORVASC 5 mg daily , Coreg 18.375 twice a day, losartan 50 bid,    IRON DEF ANEMIA, continues OTC iron supplements and vitamin C---- does not want to do colonoscopy endoscopy at this time,     DM , continue AMARYL 1 mg qam , Januvia 50 mg daily, metformin 850 mg twice a day,----hemoglobin A1c 6.7--up slightly, August 31, 2023    OBESITY, discussed October 2021    DEPRESSION-   continue Paxil 40 mg daily    ELEVATED CHOL-continues PRAVASTATIN 80 MG QHS     CKD stage IIIa, stable aug 2023 --,    Urge inCONTINENCE-continues OXYBUTYNIN DISCUSSED--USING ONE DAILY, estrogen cream daily or twice weekly      NEUROPATHY, RX OPTIONS DISCUSSED --B VITAMINS REC     EYE CHECKED --DOES JUAN LUIS, JAN 2023     DOES JUAN LUIS [facility] IN [place] --OCT 2017, HAD DIAGN. MAMMO AND DID BX , BENIGN--LMAMMO NOV 2019, [place] ,--OK TO STOP MAY 2020,          Follow Up   Return in about 6 months (around 3/5/2024).  Patient was given instructions and counseling regarding her condition or for health maintenance advice. Please see specific information pulled into the AVS if appropriate.

## 2023-09-05 NOTE — PROGRESS NOTES
The ABCs of the Annual Wellness Visit  Subsequent Medicare Wellness Visit    Subjective      Devi Diallo is a 87 y.o. female who presents for a Subsequent Medicare Wellness Visit.    The following portions of the patient's history were reviewed and   updated as appropriate: allergies, current medications, past family history, past medical history, past social history, past surgical history, and problem list.    Compared to one year ago, the patient feels her physical   health is the same.    Compared to one year ago, the patient feels her mental   health is the same.    Recent Hospitalizations:  She was not admitted to the hospital during the last year.       Current Medical Providers:  Patient Care Team:  Jacobo Redmond MD as PCP - General (Internal Medicine)  Teena Knapp MD as Consulting Physician (Obstetrics and Gynecology)    Outpatient Medications Prior to Visit   Medication Sig Dispense Refill    albuterol sulfate  (90 Base) MCG/ACT inhaler Inhale 2 puffs Every 4 (Four) Hours As Needed for Wheezing. 18 g 11    amLODIPine (NORVASC) 5 MG tablet TAKE ONE TABLET BY MOUTH DAILY 90 tablet 3    Ascorbic Acid (Vitamin C) 500 MG capsule Take 500 mg by mouth.      Aspirin 81 MG capsule Daily.      carvedilol (COREG) 12.5 MG tablet TAKE 1 AND 1/2 TABLET BY MOUTH TWO TIMES A  tablet 1    cetirizine (zyrTEC) 10 MG tablet Take 1 tablet by mouth Daily.      chlorhexidine (PERIDEX) 0.12 % solution       cholecalciferol (VITAMIN D3) 25 MCG (1000 UT) tablet Take 1 tablet by mouth Daily.      clobetasol (TEMOVATE) 0.05 % cream APPLY TO AFFECTED AREA(S) TWO TIMES A DAY 45 g 3    Cranberry 1000 MG capsule Take 1,000 mg by mouth.      Denta 5000 Plus 1.1 % cream       estradiol (ESTRACE) 0.1 MG/GM vaginal cream INSERT 2 GRAMS INTO THE VAGINA DAILY (Patient taking differently: Insert 2 g into the vagina 2 (Two) Times a Week.) 42.5 g 12    ezetimibe (ZETIA) 10 MG tablet TAKE ONE TABLET BY MOUTH DAILY  90 tablet 3    ferrous sulfate 325 (65 FE) MG tablet Take 1 tablet by mouth Daily With Breakfast. 90 tablet 3    Fluticasone Furoate-Vilanterol (Breo Ellipta) 200-25 MCG/ACT inhaler Inhale 1 puff Daily. 60 each 11    furosemide (LASIX) 20 MG tablet TAKE ONE TABLET BY MOUTH TWICE A  tablet 1    glimepiride (AMARYL) 2 MG tablet TAKE ONE TABLET BY MOUTH TWICE A DAY (Patient taking differently: 1 tab PO Q AM if needed) 180 tablet 1    Januvia 50 MG tablet TAKE ONE TABLET BY MOUTH DAILY 90 tablet 3    losartan (COZAAR) 50 MG tablet Take 1 tablet by mouth 2 (Two) Times a Day. 180 tablet 2    metFORMIN (GLUCOPHAGE) 850 MG tablet TAKE ONE TABLET BY MOUTH TWICE A  tablet 3    montelukast (SINGULAIR) 10 MG tablet       multivitamin with minerals tablet tablet Take 1 tablet by mouth Daily.      nitroglycerin (NITROSTAT) 0.4 MG SL tablet 1 under the tongue as needed for angina, may repeat q5mins for up three doses 100 tablet 11    nystatin-triamcinolone (MYCOLOG II) 804242-1.1 UNIT/GM-% cream by Other route See Admin Instructions.      pantoprazole (PROTONIX) 40 MG EC tablet TAKE ONE TABLET BY MOUTH DAILY 90 tablet 3    PARoxetine (PAXIL) 40 MG tablet Take 1 tablet by mouth Daily. 90 tablet 3    potassium chloride (MICRO-K) 10 MEQ CR capsule TAKE ONE CAPSULE BY MOUTH TWICE A DAY WITH FOOD (Patient taking differently: Only if taking water pill) 60 capsule 5    pravastatin (PRAVACHOL) 80 MG tablet TAKE ONE TABLET BY MOUTH DAILY 90 tablet 3    witch hazel-glycerin (TUCKS) pad Insert  into the rectum As Needed for Irritation or Hemorrhoids. 100 each 12    Zinc 25 MG tablet Take 25 mg by mouth.       No facility-administered medications prior to visit.       No opioid medication identified on active medication list. I have reviewed chart for other potential  high risk medication/s and harmful drug interactions in the elderly.        Aspirin is on active medication list. Aspirin use is indicated based on review of  "current medical condition/s. Pros and cons of this therapy have been discussed today. Benefits of this medication outweigh potential harm.  Patient has been encouraged to continue taking this medication.  .      Patient Active Problem List   Diagnosis    Atherosclerosis of native coronary artery of native heart without angina pectoris    Essential hypertension    Hypercholesterolemia    Stage 2 chronic kidney disease    Iron deficiency anemia    Class 3 severe obesity due to excess calories with serious comorbidity and body mass index (BMI) of 40.0 to 44.9 in adult    Diabetes 1.5, managed as type 2    Nausea    Acute cystitis without hematuria    Vaginal yeast infection    White blood cell disorder    Urge incontinence of urine    Type 2 diabetes mellitus without complication    Seasonal allergic rhinitis    Heart failure    Gastro-esophageal reflux disease without esophagitis    Dizziness and giddiness    Diabetes    Arthritis    Nonrheumatic mitral valve regurgitation    Stage 3b chronic kidney disease    Stage 3a chronic kidney disease    Hemorrhoids    Medicare annual wellness visit, subsequent     Advance Care Planning   Advance Care Planning     Advance Directive is not on file.  ACP discussion was held with the patient during this visit. Patient has an advance directive (not in EMR), copy requested.     Objective    Vitals:    09/05/23 1108   BP: 152/65   Pulse: 80   Temp: 97.8 °F (36.6 °C)   SpO2: 93%   Weight: 97.1 kg (214 lb)   Height: 152.4 cm (60\")     Estimated body mass index is 41.79 kg/m² as calculated from the following:    Height as of this encounter: 152.4 cm (60\").    Weight as of this encounter: 97.1 kg (214 lb).    Class 3 Severe Obesity (BMI >=40). Obesity-related health conditions include the following: hypertension and diabetes mellitus. Obesity is unchanged. BMI is is above average; BMI management plan is completed. We discussed portion control and increasing exercise.      Does the " patient have evidence of cognitive impairment?   No    Lab Results   Component Value Date    TRIG 114 2023    HDL 48 2023    LDL 50 2023    VLDL 21 2023    HGBA1C 6.70 (H) 2023          HEALTH RISK ASSESSMENT    Smoking Status:  Social History     Tobacco Use   Smoking Status Never    Passive exposure: Never   Smokeless Tobacco Never     Alcohol Consumption:  Social History     Substance and Sexual Activity   Alcohol Use Never     Fall Risk Screen:    STEADI Fall Risk Assessment was completed, and patient is at MODERATE risk for falls. Assessment completed on:3/1/2023    Depression Screening:      3/1/2023    11:19 AM   PHQ-2/PHQ-9 Depression Screening   Little Interest or Pleasure in Doing Things 0-->not at all   Feeling Down, Depressed or Hopeless 0-->not at all   PHQ-9: Brief Depression Severity Measure Score 0       Health Habits and Functional and Cognitive Screenin/5/2023    11:00 AM   Functional & Cognitive Status   Do you have difficulty preparing food and eating? No   Do you have difficulty bathing yourself, getting dressed or grooming yourself? No   Do you have difficulty using the toilet? No   Do you have difficulty moving around from place to place? Yes   Do you have trouble with steps or getting out of a bed or a chair? Yes   Do you need help using the phone?  No   Are you deaf or do you have serious difficulty hearing?  Yes   Do you need help to go to places out of walking distance? Yes   Do you need help shopping? Yes   Do you need help preparing meals?  No   Do you need help with housework?  Yes   Do you need help with laundry? Yes   Do you need help taking your medications? Yes   Do you need help managing money? No   Do you ever drive or ride in a car without wearing a seat belt? No   Do you have difficulty concentrating, remembering or making decisions? No       Age-appropriate Screening Schedule:  Refer to the list below for future screening recommendations  based on patient's age, sex and/or medical conditions. Orders for these recommended tests are listed in the plan section. The patient has been provided with a written plan.    Health Maintenance   Topic Date Due    DXA SCAN  Never done    TDAP/TD VACCINES (1 - Tdap) Never done    ZOSTER VACCINE (1 of 2) Never done    BMI FOLLOWUP  11/17/2022    DIABETIC EYE EXAM  05/23/2023    COVID-19 Vaccine (3 - Moderna series) 10/01/2023 (Originally 5/14/2021)    INFLUENZA VACCINE  10/01/2023    HEMOGLOBIN A1C  02/29/2024    LIPID PANEL  08/31/2024    URINE MICROALBUMIN  08/31/2024    ANNUAL WELLNESS VISIT  09/05/2024    Pneumococcal Vaccine 65+  Completed                  CMS Preventative Services Quick Reference  Risk Factors Identified During Encounter:    Inactivity/Sedentary: Patient was advised to exercise at least 150 minutes a week per CDC recommendations. and Patient was advised to do at least 150 minutes a week of moderate intensity activity such as brisk walking and do at least 2 days a week of activities that strengthen muscles such as resistance training and do activities to improve balance such as standing on one foot about 3 days a week.    The above risks/problems have been discussed with the patient.  Pertinent information has been shared with the patient in the After Visit Summary.    Diagnoses and all orders for this visit:    1. Medicare annual wellness visit, subsequent (Primary)    2. Stage 3a chronic kidney disease    3. Essential hypertension    4. Type 2 diabetes mellitus without complication, without long-term current use of insulin    5. White blood cell disorder    6. Gastro-esophageal reflux disease without esophagitis        Follow Up:   Next Medicare Wellness visit to be scheduled in 1 year.      An After Visit Summary and PPPS were made available to the patient.

## 2023-09-07 RX ORDER — LOSARTAN POTASSIUM 50 MG/1
TABLET ORAL
Qty: 180 TABLET | Refills: 2 | Status: SHIPPED | OUTPATIENT
Start: 2023-09-07

## 2023-10-17 ENCOUNTER — CLINICAL SUPPORT (OUTPATIENT)
Dept: INTERNAL MEDICINE | Facility: CLINIC | Age: 88
End: 2023-10-17
Payer: MEDICARE

## 2023-10-17 DIAGNOSIS — Z23 ENCOUNTER FOR VACCINATION: Primary | ICD-10-CM

## 2023-10-17 PROCEDURE — 90662 IIV NO PRSV INCREASED AG IM: CPT | Performed by: INTERNAL MEDICINE

## 2023-10-17 PROCEDURE — G0008 ADMIN INFLUENZA VIRUS VAC: HCPCS | Performed by: INTERNAL MEDICINE

## 2023-10-21 ENCOUNTER — APPOINTMENT (OUTPATIENT)
Dept: GENERAL RADIOLOGY | Facility: HOSPITAL | Age: 88
End: 2023-10-21
Payer: MEDICARE

## 2023-10-21 ENCOUNTER — HOSPITAL ENCOUNTER (INPATIENT)
Facility: HOSPITAL | Age: 88
LOS: 3 days | Discharge: HOME HEALTH | End: 2023-10-24
Attending: EMERGENCY MEDICINE | Admitting: INTERNAL MEDICINE
Payer: MEDICARE

## 2023-10-21 DIAGNOSIS — J42 CHRONIC BRONCHITIS, UNSPECIFIED CHRONIC BRONCHITIS TYPE: ICD-10-CM

## 2023-10-21 DIAGNOSIS — J96.01 ACUTE RESPIRATORY FAILURE WITH HYPOXIA: ICD-10-CM

## 2023-10-21 DIAGNOSIS — R06.09 DYSPNEA ON EXERTION: ICD-10-CM

## 2023-10-21 DIAGNOSIS — J45.40 MODERATE PERSISTENT ASTHMA WITHOUT COMPLICATION: ICD-10-CM

## 2023-10-21 DIAGNOSIS — R26.2 DIFFICULTY WALKING: ICD-10-CM

## 2023-10-21 DIAGNOSIS — I50.9 ACUTE ON CHRONIC CONGESTIVE HEART FAILURE, UNSPECIFIED HEART FAILURE TYPE: Primary | ICD-10-CM

## 2023-10-21 DIAGNOSIS — Z78.9 DECREASED ACTIVITIES OF DAILY LIVING (ADL): ICD-10-CM

## 2023-10-21 LAB
ALBUMIN SERPL-MCNC: 4.1 G/DL (ref 3.5–5.2)
ALBUMIN/GLOB SERPL: 1.2 G/DL
ALP SERPL-CCNC: 66 U/L (ref 39–117)
ALT SERPL W P-5'-P-CCNC: 11 U/L (ref 1–33)
ANION GAP SERPL CALCULATED.3IONS-SCNC: 10.3 MMOL/L (ref 5–15)
AST SERPL-CCNC: 8 U/L (ref 1–32)
B PARAPERT DNA SPEC QL NAA+PROBE: NOT DETECTED
B PERT DNA SPEC QL NAA+PROBE: NOT DETECTED
BASOPHILS # BLD AUTO: 0.03 10*3/MM3 (ref 0–0.2)
BASOPHILS NFR BLD AUTO: 0.3 % (ref 0–1.5)
BILIRUB SERPL-MCNC: 0.6 MG/DL (ref 0–1.2)
BUN SERPL-MCNC: 31 MG/DL (ref 8–23)
BUN/CREAT SERPL: 28.7 (ref 7–25)
C PNEUM DNA NPH QL NAA+NON-PROBE: NOT DETECTED
CALCIUM SPEC-SCNC: 9.9 MG/DL (ref 8.6–10.5)
CHLORIDE SERPL-SCNC: 106 MMOL/L (ref 98–107)
CO2 SERPL-SCNC: 23.7 MMOL/L (ref 22–29)
CREAT SERPL-MCNC: 1.08 MG/DL (ref 0.57–1)
DEPRECATED RDW RBC AUTO: 56.7 FL (ref 37–54)
EGFRCR SERPLBLD CKD-EPI 2021: 50.4 ML/MIN/1.73
EOSINOPHIL # BLD AUTO: 0.29 10*3/MM3 (ref 0–0.4)
EOSINOPHIL NFR BLD AUTO: 3 % (ref 0.3–6.2)
ERYTHROCYTE [DISTWIDTH] IN BLOOD BY AUTOMATED COUNT: 16.3 % (ref 12.3–15.4)
FLUAV SUBTYP SPEC NAA+PROBE: NOT DETECTED
FLUAV SUBTYP SPEC NAA+PROBE: NOT DETECTED
FLUBV RNA ISLT QL NAA+PROBE: NOT DETECTED
FLUBV RNA ISLT QL NAA+PROBE: NOT DETECTED
GEN 5 2HR TROPONIN T REFLEX: 16 NG/L
GLOBULIN UR ELPH-MCNC: 3.4 GM/DL
GLUCOSE BLDC GLUCOMTR-MCNC: 130 MG/DL (ref 70–99)
GLUCOSE BLDC GLUCOMTR-MCNC: 139 MG/DL (ref 70–99)
GLUCOSE SERPL-MCNC: 170 MG/DL (ref 65–99)
HADV DNA SPEC NAA+PROBE: NOT DETECTED
HCOV 229E RNA SPEC QL NAA+PROBE: NOT DETECTED
HCOV HKU1 RNA SPEC QL NAA+PROBE: NOT DETECTED
HCOV NL63 RNA SPEC QL NAA+PROBE: NOT DETECTED
HCOV OC43 RNA SPEC QL NAA+PROBE: NOT DETECTED
HCT VFR BLD AUTO: 35.5 % (ref 34–46.6)
HGB BLD-MCNC: 10.6 G/DL (ref 12–15.9)
HMPV RNA NPH QL NAA+NON-PROBE: NOT DETECTED
HOLD SPECIMEN: NORMAL
HOLD SPECIMEN: NORMAL
HPIV1 RNA ISLT QL NAA+PROBE: NOT DETECTED
HPIV2 RNA SPEC QL NAA+PROBE: NOT DETECTED
HPIV3 RNA NPH QL NAA+PROBE: NOT DETECTED
HPIV4 P GENE NPH QL NAA+PROBE: NOT DETECTED
IMM GRANULOCYTES # BLD AUTO: 0.03 10*3/MM3 (ref 0–0.05)
IMM GRANULOCYTES NFR BLD AUTO: 0.3 % (ref 0–0.5)
LYMPHOCYTES # BLD AUTO: 0.9 10*3/MM3 (ref 0.7–3.1)
LYMPHOCYTES NFR BLD AUTO: 9.2 % (ref 19.6–45.3)
M PNEUMO IGG SER IA-ACNC: NOT DETECTED
MAGNESIUM SERPL-MCNC: 1.7 MG/DL (ref 1.6–2.4)
MCH RBC QN AUTO: 28.6 PG (ref 26.6–33)
MCHC RBC AUTO-ENTMCNC: 29.9 G/DL (ref 31.5–35.7)
MCV RBC AUTO: 95.9 FL (ref 79–97)
MONOCYTES # BLD AUTO: 0.58 10*3/MM3 (ref 0.1–0.9)
MONOCYTES NFR BLD AUTO: 5.9 % (ref 5–12)
NEUTROPHILS NFR BLD AUTO: 7.95 10*3/MM3 (ref 1.7–7)
NEUTROPHILS NFR BLD AUTO: 81.3 % (ref 42.7–76)
NRBC BLD AUTO-RTO: 0 /100 WBC (ref 0–0.2)
NT-PROBNP SERPL-MCNC: 394.7 PG/ML (ref 0–1800)
PLATELET # BLD AUTO: 226 10*3/MM3 (ref 140–450)
PMV BLD AUTO: 8.9 FL (ref 6–12)
POTASSIUM SERPL-SCNC: 4.9 MMOL/L (ref 3.5–5.2)
PROCALCITONIN SERPL-MCNC: 0.07 NG/ML (ref 0–0.25)
PROT SERPL-MCNC: 7.5 G/DL (ref 6–8.5)
QT INTERVAL: 384 MS
QTC INTERVAL: 422 MS
RBC # BLD AUTO: 3.7 10*6/MM3 (ref 3.77–5.28)
RHINOVIRUS RNA SPEC NAA+PROBE: NOT DETECTED
RSV RNA NPH QL NAA+NON-PROBE: NOT DETECTED
RSV RNA NPH QL NAA+NON-PROBE: NOT DETECTED
SARS-COV-2 RNA RESP QL NAA+PROBE: NOT DETECTED
SARS-COV-2 RNA RESP QL NAA+PROBE: NOT DETECTED
SODIUM SERPL-SCNC: 140 MMOL/L (ref 136–145)
TROPONIN T DELTA: 1 NG/L
TROPONIN T SERPL HS-MCNC: 15 NG/L
TROPONIN T SERPL HS-MCNC: 15 NG/L
WBC NRBC COR # BLD: 9.78 10*3/MM3 (ref 3.4–10.8)
WHOLE BLOOD HOLD COAG: NORMAL
WHOLE BLOOD HOLD SPECIMEN: NORMAL

## 2023-10-21 PROCEDURE — 99222 1ST HOSP IP/OBS MODERATE 55: CPT | Performed by: INTERNAL MEDICINE

## 2023-10-21 PROCEDURE — 93005 ELECTROCARDIOGRAM TRACING: CPT

## 2023-10-21 PROCEDURE — 94761 N-INVAS EAR/PLS OXIMETRY MLT: CPT

## 2023-10-21 PROCEDURE — 83735 ASSAY OF MAGNESIUM: CPT | Performed by: INTERNAL MEDICINE

## 2023-10-21 PROCEDURE — 25010000002 ENOXAPARIN PER 10 MG: Performed by: INTERNAL MEDICINE

## 2023-10-21 PROCEDURE — 84484 ASSAY OF TROPONIN QUANT: CPT | Performed by: INTERNAL MEDICINE

## 2023-10-21 PROCEDURE — 25010000002 FUROSEMIDE PER 20 MG: Performed by: EMERGENCY MEDICINE

## 2023-10-21 PROCEDURE — 85025 COMPLETE CBC W/AUTO DIFF WBC: CPT | Performed by: EMERGENCY MEDICINE

## 2023-10-21 PROCEDURE — 71045 X-RAY EXAM CHEST 1 VIEW: CPT

## 2023-10-21 PROCEDURE — 94799 UNLISTED PULMONARY SVC/PX: CPT

## 2023-10-21 PROCEDURE — 82948 REAGENT STRIP/BLOOD GLUCOSE: CPT

## 2023-10-21 PROCEDURE — 87637 SARSCOV2&INF A&B&RSV AMP PRB: CPT | Performed by: EMERGENCY MEDICINE

## 2023-10-21 PROCEDURE — 25010000002 METHYLPREDNISOLONE PER 40 MG: Performed by: INTERNAL MEDICINE

## 2023-10-21 PROCEDURE — 84484 ASSAY OF TROPONIN QUANT: CPT | Performed by: EMERGENCY MEDICINE

## 2023-10-21 PROCEDURE — 93005 ELECTROCARDIOGRAM TRACING: CPT | Performed by: EMERGENCY MEDICINE

## 2023-10-21 PROCEDURE — 99285 EMERGENCY DEPT VISIT HI MDM: CPT

## 2023-10-21 PROCEDURE — 83880 ASSAY OF NATRIURETIC PEPTIDE: CPT | Performed by: EMERGENCY MEDICINE

## 2023-10-21 PROCEDURE — 93010 ELECTROCARDIOGRAM REPORT: CPT | Performed by: INTERNAL MEDICINE

## 2023-10-21 PROCEDURE — 84145 PROCALCITONIN (PCT): CPT | Performed by: INTERNAL MEDICINE

## 2023-10-21 PROCEDURE — 80053 COMPREHEN METABOLIC PANEL: CPT | Performed by: EMERGENCY MEDICINE

## 2023-10-21 PROCEDURE — 94640 AIRWAY INHALATION TREATMENT: CPT

## 2023-10-21 PROCEDURE — 0202U NFCT DS 22 TRGT SARS-COV-2: CPT | Performed by: INTERNAL MEDICINE

## 2023-10-21 RX ORDER — ALBUTEROL SULFATE 2.5 MG/3ML
2.5 SOLUTION RESPIRATORY (INHALATION) EVERY 6 HOURS PRN
Status: DISCONTINUED | OUTPATIENT
Start: 2023-10-21 | End: 2023-10-24 | Stop reason: HOSPADM

## 2023-10-21 RX ORDER — ARFORMOTEROL TARTRATE 15 UG/2ML
15 SOLUTION RESPIRATORY (INHALATION)
Status: DISCONTINUED | OUTPATIENT
Start: 2023-10-21 | End: 2023-10-24 | Stop reason: HOSPADM

## 2023-10-21 RX ORDER — IPRATROPIUM BROMIDE AND ALBUTEROL SULFATE 2.5; .5 MG/3ML; MG/3ML
3 SOLUTION RESPIRATORY (INHALATION)
Status: DISCONTINUED | OUTPATIENT
Start: 2023-10-21 | End: 2023-10-24 | Stop reason: HOSPADM

## 2023-10-21 RX ORDER — DEXTROSE MONOHYDRATE 25 G/50ML
25 INJECTION, SOLUTION INTRAVENOUS
Status: DISCONTINUED | OUTPATIENT
Start: 2023-10-21 | End: 2023-10-24 | Stop reason: HOSPADM

## 2023-10-21 RX ORDER — MULTIPLE VITAMINS W/ MINERALS TAB 9MG-400MCG
1 TAB ORAL NIGHTLY
Status: DISCONTINUED | OUTPATIENT
Start: 2023-10-21 | End: 2023-10-24 | Stop reason: HOSPADM

## 2023-10-21 RX ORDER — PAROXETINE HYDROCHLORIDE 20 MG/1
40 TABLET, FILM COATED ORAL NIGHTLY
Status: DISCONTINUED | OUTPATIENT
Start: 2023-10-21 | End: 2023-10-24 | Stop reason: HOSPADM

## 2023-10-21 RX ORDER — BISACODYL 5 MG/1
5 TABLET, DELAYED RELEASE ORAL DAILY PRN
Status: DISCONTINUED | OUTPATIENT
Start: 2023-10-21 | End: 2023-10-24 | Stop reason: HOSPADM

## 2023-10-21 RX ORDER — IBUPROFEN 600 MG/1
1 TABLET ORAL
Status: DISCONTINUED | OUTPATIENT
Start: 2023-10-21 | End: 2023-10-24 | Stop reason: HOSPADM

## 2023-10-21 RX ORDER — SODIUM CHLORIDE 0.9 % (FLUSH) 0.9 %
10 SYRINGE (ML) INJECTION EVERY 12 HOURS SCHEDULED
Status: DISCONTINUED | OUTPATIENT
Start: 2023-10-21 | End: 2023-10-24 | Stop reason: HOSPADM

## 2023-10-21 RX ORDER — ACETAMINOPHEN 500 MG
1000 TABLET ORAL EVERY 6 HOURS PRN
Status: DISCONTINUED | OUTPATIENT
Start: 2023-10-21 | End: 2023-10-22

## 2023-10-21 RX ORDER — PRAVASTATIN SODIUM 20 MG
80 TABLET ORAL NIGHTLY
Status: DISCONTINUED | OUTPATIENT
Start: 2023-10-21 | End: 2023-10-24 | Stop reason: HOSPADM

## 2023-10-21 RX ORDER — ASPIRIN 81 MG/1
81 TABLET ORAL NIGHTLY
Status: DISCONTINUED | OUTPATIENT
Start: 2023-10-21 | End: 2023-10-24 | Stop reason: HOSPADM

## 2023-10-21 RX ORDER — CHOLECALCIFEROL (VITAMIN D3) 125 MCG
5 CAPSULE ORAL NIGHTLY PRN
Status: DISCONTINUED | OUTPATIENT
Start: 2023-10-21 | End: 2023-10-24 | Stop reason: HOSPADM

## 2023-10-21 RX ORDER — BENZONATATE 100 MG/1
100 CAPSULE ORAL 3 TIMES DAILY PRN
Status: DISCONTINUED | OUTPATIENT
Start: 2023-10-21 | End: 2023-10-24 | Stop reason: HOSPADM

## 2023-10-21 RX ORDER — MELATONIN
1000 DAILY
Status: DISCONTINUED | OUTPATIENT
Start: 2023-10-22 | End: 2023-10-24 | Stop reason: HOSPADM

## 2023-10-21 RX ORDER — MONTELUKAST SODIUM 10 MG/1
10 TABLET ORAL EVERY MORNING
Status: DISCONTINUED | OUTPATIENT
Start: 2023-10-22 | End: 2023-10-21

## 2023-10-21 RX ORDER — NICOTINE POLACRILEX 4 MG
15 LOZENGE BUCCAL
Status: DISCONTINUED | OUTPATIENT
Start: 2023-10-21 | End: 2023-10-24 | Stop reason: HOSPADM

## 2023-10-21 RX ORDER — AMLODIPINE BESYLATE 5 MG/1
5 TABLET ORAL DAILY
Status: DISCONTINUED | OUTPATIENT
Start: 2023-10-21 | End: 2023-10-24 | Stop reason: HOSPADM

## 2023-10-21 RX ORDER — HYDROCODONE BITARTRATE AND ACETAMINOPHEN 5; 325 MG/1; MG/1
1 TABLET ORAL EVERY 6 HOURS PRN
Status: DISCONTINUED | OUTPATIENT
Start: 2023-10-21 | End: 2023-10-24 | Stop reason: HOSPADM

## 2023-10-21 RX ORDER — GUAIFENESIN 600 MG/1
600 TABLET, EXTENDED RELEASE ORAL EVERY 12 HOURS SCHEDULED
Status: DISCONTINUED | OUTPATIENT
Start: 2023-10-21 | End: 2023-10-22

## 2023-10-21 RX ORDER — INSULIN LISPRO 100 [IU]/ML
2-7 INJECTION, SOLUTION INTRAVENOUS; SUBCUTANEOUS
Status: DISCONTINUED | OUTPATIENT
Start: 2023-10-21 | End: 2023-10-24 | Stop reason: HOSPADM

## 2023-10-21 RX ORDER — SODIUM CHLORIDE 0.9 % (FLUSH) 0.9 %
10 SYRINGE (ML) INJECTION AS NEEDED
Status: DISCONTINUED | OUTPATIENT
Start: 2023-10-21 | End: 2023-10-24 | Stop reason: HOSPADM

## 2023-10-21 RX ORDER — ENOXAPARIN SODIUM 100 MG/ML
40 INJECTION SUBCUTANEOUS EVERY 24 HOURS
Status: DISCONTINUED | OUTPATIENT
Start: 2023-10-21 | End: 2023-10-22

## 2023-10-21 RX ORDER — FUROSEMIDE 10 MG/ML
60 INJECTION INTRAMUSCULAR; INTRAVENOUS ONCE
Status: COMPLETED | OUTPATIENT
Start: 2023-10-21 | End: 2023-10-21

## 2023-10-21 RX ORDER — POLYETHYLENE GLYCOL 3350 17 G/17G
17 POWDER, FOR SOLUTION ORAL DAILY PRN
Status: DISCONTINUED | OUTPATIENT
Start: 2023-10-21 | End: 2023-10-24 | Stop reason: HOSPADM

## 2023-10-21 RX ORDER — NITROGLYCERIN 0.4 MG/1
0.4 TABLET SUBLINGUAL
Status: DISCONTINUED | OUTPATIENT
Start: 2023-10-21 | End: 2023-10-24 | Stop reason: HOSPADM

## 2023-10-21 RX ORDER — FUROSEMIDE 10 MG/ML
40 INJECTION INTRAMUSCULAR; INTRAVENOUS
Status: DISCONTINUED | OUTPATIENT
Start: 2023-10-21 | End: 2023-10-21

## 2023-10-21 RX ORDER — PANTOPRAZOLE SODIUM 40 MG/1
40 TABLET, DELAYED RELEASE ORAL EVERY MORNING
Status: DISCONTINUED | OUTPATIENT
Start: 2023-10-21 | End: 2023-10-24 | Stop reason: HOSPADM

## 2023-10-21 RX ORDER — AZITHROMYCIN 250 MG/1
250 TABLET, FILM COATED ORAL DAILY
Status: DISCONTINUED | OUTPATIENT
Start: 2023-10-22 | End: 2023-10-24 | Stop reason: HOSPADM

## 2023-10-21 RX ORDER — LOSARTAN POTASSIUM 25 MG/1
50 TABLET ORAL 2 TIMES DAILY
Status: DISCONTINUED | OUTPATIENT
Start: 2023-10-21 | End: 2023-10-24 | Stop reason: HOSPADM

## 2023-10-21 RX ORDER — BUDESONIDE 0.5 MG/2ML
0.5 INHALANT ORAL
Status: DISCONTINUED | OUTPATIENT
Start: 2023-10-21 | End: 2023-10-24 | Stop reason: HOSPADM

## 2023-10-21 RX ORDER — MONTELUKAST SODIUM 10 MG/1
10 TABLET ORAL DAILY
Status: DISCONTINUED | OUTPATIENT
Start: 2023-10-22 | End: 2023-10-24 | Stop reason: HOSPADM

## 2023-10-21 RX ORDER — ONDANSETRON 2 MG/ML
4 INJECTION INTRAMUSCULAR; INTRAVENOUS EVERY 6 HOURS PRN
Status: DISCONTINUED | OUTPATIENT
Start: 2023-10-21 | End: 2023-10-24 | Stop reason: HOSPADM

## 2023-10-21 RX ORDER — AMOXICILLIN 250 MG
2 CAPSULE ORAL 2 TIMES DAILY
Status: DISCONTINUED | OUTPATIENT
Start: 2023-10-21 | End: 2023-10-24 | Stop reason: HOSPADM

## 2023-10-21 RX ORDER — AZITHROMYCIN 250 MG/1
500 TABLET, FILM COATED ORAL DAILY
Status: COMPLETED | OUTPATIENT
Start: 2023-10-21 | End: 2023-10-21

## 2023-10-21 RX ORDER — SODIUM CHLORIDE 9 MG/ML
40 INJECTION, SOLUTION INTRAVENOUS AS NEEDED
Status: DISCONTINUED | OUTPATIENT
Start: 2023-10-21 | End: 2023-10-24 | Stop reason: HOSPADM

## 2023-10-21 RX ORDER — BISACODYL 10 MG
10 SUPPOSITORY, RECTAL RECTAL DAILY PRN
Status: DISCONTINUED | OUTPATIENT
Start: 2023-10-21 | End: 2023-10-24 | Stop reason: HOSPADM

## 2023-10-21 RX ORDER — CETIRIZINE HYDROCHLORIDE 10 MG/1
10 TABLET ORAL DAILY
Status: DISCONTINUED | OUTPATIENT
Start: 2023-10-21 | End: 2023-10-24 | Stop reason: HOSPADM

## 2023-10-21 RX ORDER — FERROUS SULFATE 325(65) MG
325 TABLET ORAL NIGHTLY
Status: DISCONTINUED | OUTPATIENT
Start: 2023-10-21 | End: 2023-10-24 | Stop reason: HOSPADM

## 2023-10-21 RX ORDER — METHYLPREDNISOLONE SODIUM SUCCINATE 40 MG/ML
40 INJECTION, POWDER, LYOPHILIZED, FOR SOLUTION INTRAMUSCULAR; INTRAVENOUS ONCE
Status: COMPLETED | OUTPATIENT
Start: 2023-10-21 | End: 2023-10-21

## 2023-10-21 RX ORDER — SODIUM CHLORIDE 0.9 % (FLUSH) 0.9 %
10 SYRINGE (ML) INJECTION AS NEEDED
Status: DISCONTINUED | OUTPATIENT
Start: 2023-10-21 | End: 2023-10-21 | Stop reason: SDUPTHER

## 2023-10-21 RX ADMIN — PAROXETINE HYDROCHLORIDE 40 MG: 20 TABLET, FILM COATED ORAL at 20:58

## 2023-10-21 RX ADMIN — ENOXAPARIN SODIUM 40 MG: 100 INJECTION SUBCUTANEOUS at 15:08

## 2023-10-21 RX ADMIN — IPRATROPIUM BROMIDE AND ALBUTEROL SULFATE 3 ML: .5; 3 SOLUTION RESPIRATORY (INHALATION) at 18:16

## 2023-10-21 RX ADMIN — BUDESONIDE 0.5 MG: 0.5 INHALANT RESPIRATORY (INHALATION) at 18:16

## 2023-10-21 RX ADMIN — ARFORMOTEROL TARTRATE 15 MCG: 15 SOLUTION RESPIRATORY (INHALATION) at 18:16

## 2023-10-21 RX ADMIN — LOSARTAN POTASSIUM 50 MG: 50 TABLET, FILM COATED ORAL at 20:58

## 2023-10-21 RX ADMIN — MULTIPLE VITAMINS W/ MINERALS TAB 1 TABLET: TAB at 20:58

## 2023-10-21 RX ADMIN — PRAVASTATIN SODIUM 80 MG: 20 TABLET ORAL at 20:58

## 2023-10-21 RX ADMIN — AZITHROMYCIN DIHYDRATE 500 MG: 250 TABLET, FILM COATED ORAL at 18:01

## 2023-10-21 RX ADMIN — DOCUSATE SODIUM 50MG AND SENNOSIDES 8.6MG 2 TABLET: 8.6; 5 TABLET, FILM COATED ORAL at 15:07

## 2023-10-21 RX ADMIN — GUAIFENESIN 600 MG: 600 TABLET ORAL at 21:35

## 2023-10-21 RX ADMIN — FERROUS SULFATE TAB 325 MG (65 MG ELEMENTAL FE) 325 MG: 325 (65 FE) TAB at 20:58

## 2023-10-21 RX ADMIN — METHYLPREDNISOLONE SODIUM SUCCINATE 40 MG: 40 INJECTION INTRAMUSCULAR; INTRAVENOUS at 18:01

## 2023-10-21 RX ADMIN — CARVEDILOL 18.75 MG: 12.5 TABLET, FILM COATED ORAL at 20:58

## 2023-10-21 RX ADMIN — FUROSEMIDE 60 MG: 10 INJECTION, SOLUTION INTRAMUSCULAR; INTRAVENOUS at 11:49

## 2023-10-21 RX ADMIN — ASPIRIN 81 MG: 81 TABLET, COATED ORAL at 20:58

## 2023-10-21 RX ADMIN — Medication 10 ML: at 20:59

## 2023-10-21 NOTE — H&P
[facility] HISTORY AND PHYSICAL  Date: 10/21/2023   Patient Name: Devi Diallo  : 1935  MRN: [id number]  Primary Care Physician:  Jacobo Redmond MD  Date of admission: 10/21/2023    Subjective   Subjective     Chief Complaint: Shortness of breath    HPI:    Devi Diallo is a 87 y.o. female with past medical history for coronary disease, asthma, chronic bronchitis, chronic hypoxic respiratory failure currently on no supplemental O2 who presented to the emergency department on today with complaint of 2 to 3-day history of increasing shortness of breath well as a cough productive of a yellowish sputum.  She denies any hemoptysis.  She denies any fever no chills.  In the emergency department patient was noted to have O2 sats of 72% on room air.  She subsequently was placed on 3 L with improvement to 88% and then O2 was increased to 4 L.  The patient reports that she has been told on her last 2 visits with pulmonology that qualifies for O2 but she has declined getting this.  Also of note the patient reports that she was recently taken off her Lasix approximately 1 month ago secondary to worsening renal dysfunction.  While she does report some swelling in her bilateral ankles she denies that this is any worse than usual.  He denies any orthopnea or any PND at this time.    In the emergency department patient was noted to have a hemoglobin of 10.6 with 81% neutrophils.  CMP showed glucose of 170, BUN of 31 and a creatinine of 1.08.  BNP was noted to be within normal limits.  Troponin was noted to be 15.  Fluvid/RSV is negative.      Personal History     Past Medical History:  Osteoarthritis  Coronary artery disease  Diabetes mellitus type 2  Hypertension  Dyslipidemia  Reflux esophagitis  Chronic kidney disease stage II  Asthma/chronic bronchitis  Chronic diastolic heart failure    Past Surgical History:  Eye surgery  Hysterectomy    Family History:   Cancer, arthritis,  diabetes    Social History:   Patient denies tobacco EtOH or illicit drug use    Home Medications:  Cranberry, Fluticasone Furoate-Vilanterol, PARoxetine, SITagliptin, Vitamin C, Vitamin D3, Zinc, albuterol sulfate HFA, amLODIPine, aspirin, carvedilol, cetirizine, clobetasol, estradiol, ezetimibe, ferrous sulfate, glimepiride, losartan, metFORMIN, montelukast, multivitamin with minerals, nitroglycerin, pantoprazole, and pravastatin    Allergies:  Allergies   Allergen Reactions    Amoxicillin-Pot Clavulanate Hives and Unknown - Low Severity    Cipro [Ciprofloxacin Hcl] Hives    Ciprofloxacin Unknown - Low Severity    Clindamycin Unknown - Low Severity    Clindamycin/Lincomycin Rash    Macrobid [Nitrofurantoin] Hives    Penicillins Hives    Sulfamethoxazole-Trimethoprim Nausea Only and Unknown - Low Severity       Review of Systems   All systems were reviewed and negative except for: As noted in HPI    Objective   Objective     Vitals:   Temp:  [97.1 °F (36.2 °C)-98.6 °F (37 °C)] 98.6 °F (37 °C)  Heart Rate:  [62-78] 69  Resp:  [20-23] 20  BP: (128-137)/() 137/44  Flow (L/min):  [3-4] 4    Physical Exam    Constitutional: Awake, alert, no acute distress   Eyes: Pupils equal and reactive, sclerae anicteric, no conjunctival injection   HENT: NCAT, mucous membranes moist   Neck: Supple, no thyromegaly, no lymphadenopathy, trachea midline   Respiratory: Diminished breath sounds throughout all lung fields clear to auscultation bilaterally, nonlabored respirations    Cardiovascular: RRR, no appreciable murmurs, palpable pedal pulses bilaterally   Gastrointestinal: Positive bowel sounds, soft, nontender, nondistended   Musculoskeletal: Trace bilateral ankle edema, no clubbing or cyanosis to extremities   Psychiatric: Appropriate affect, cooperative   Neurologic: Oriented x 3, in all extremities equally-no focal weakness, Cranial Nerves grossly intact, speech clear   Skin: No rashes     Result Review    Result  Review:  I have personally reviewed the results from the time of this admission to 10/21/2023 17:17 EDT and agree with these findings:  [x]  Laboratory  [x]  Microbiology  [x]  Radiology  [x]  EKG/Telemetry   []  Cardiology/Vascular   []  Pathology  [x]  Old records  []  Other:      Assessment & Plan   Assessment / Plan     Assessment/Plan:   Acute exacerbation of chronic bronchitis/asthma  Chronic hypoxic respiratory failure  Chronic diastolic congestive heart failure  Diabetes mellitus type 2  Elevated troponin most likely type II secondary to hypoxia  Hypertension    Admit to the hospitalist service for further evaluation and treatment.  We will initiate DuoNebs every 6 hours scheduled and albuterol nebs as needed.  We will add Brovana/Pulmicort nebs twice daily.  Continue supplemental O2 to maintain sats greater than equal to 89%.  Patient will most likely require arrangements for home O2 prior to discharge.  We will give Solu-Medrol 40 mg IV x1 and then add prednisone 40 mg daily.  Trend cardiac enzymes.  Accu-Cheks will be ordered ACHS with sliding scale insulin as needed.  Home medications will be reviewed and resumed as clinically indicated.      Plan of care was discussed with the patient and her 2 daughters at bedside.  All questions were answered.    DVT prophylaxis:  Medical DVT prophylaxis orders are present.    CODE STATUS:     Full    Admission Status:  I believe this patient meets inpatient status.    Electronically signed by Dot Hadley MD, 10/21/23, 5:17 PM EDT.

## 2023-10-21 NOTE — ED PROVIDER NOTES
Time: 11:24 PM EDT  Date of encounter:  10/21/2023  Independent Historian/Clinical History and Information was obtained by:   Patient and Family    History is limited by: N/A    Chief Complaint: Shortness of air, dry cough x1 week      History of Present Illness:  Patient is a 87 y.o. year old female with history of CHF and chronic kidney disease who presents to the emergency department for evaluation of gradually worsening shortness of air and dry cough this week, also noted to have worsening lower extremity swelling bilaterally.      She was noted to be satting only 72% on room air and was placed on supplemental oxygen, and reports not being on any home oxygen.    She denies any chest pain or pleuritic pain with breathing and or any calf pain or history of blood clot.    She was recently taken off of her Lasix diuretic by her PCP about a month ago presumably due to worsening lab work.    She denies any fevers or chills or recent sick contacts or exposures to COVID-19 and no sputum production.    HPI    Patient Care Team  Primary Care Provider: Jacobo Redmond MD    Past Medical History:     Allergies   Allergen Reactions    Amoxicillin-Pot Clavulanate Hives and Unknown - Low Severity    Cipro [Ciprofloxacin Hcl] Hives    Ciprofloxacin Unknown - Low Severity    Clindamycin Unknown - Low Severity    Clindamycin/Lincomycin Rash    Macrobid [Nitrofurantoin] Hives    Penicillins Hives    Sulfamethoxazole-Trimethoprim Nausea Only and Unknown - Low Severity     Past Medical History:   Diagnosis Date    Arthritis     Atherosclerotic heart disease of native coronary artery without angina pectoris 1/1/2002    cardiac catheterization 2002    Diabetes     Essential hypertension 9/20/2012    Hyperlipemia     Hypertension     Pure hypercholesterolemia 4/25/2014    Reflux esophagitis     Seasonal allergies     Stage 2 chronic kidney disease 9/20/2012     Past Surgical History:   Procedure Laterality Date    COLONOSCOPY       EYE SURGERY      Eye Implant    HYSTERECTOMY       Family History   Problem Relation Age of Onset    Cancer Mother         Unspecified    Arthritis Mother     Diabetes Brother         Unspecified type    Arthritis Brother        Home Medications:  Prior to Admission medications    Medication Sig Start Date End Date Taking? Authorizing Provider   albuterol sulfate  (90 Base) MCG/ACT inhaler Inhale 2 puffs Every 4 (Four) Hours As Needed for Wheezing. 8/22/23   Harleen Dasilva APRN   amLODIPine (NORVASC) 5 MG tablet TAKE ONE TABLET BY MOUTH DAILY 5/30/23   Jacobo Redmond MD   Ascorbic Acid (Vitamin C) 500 MG capsule Take 500 mg by mouth.    Meli Devlin MD   Aspirin 81 MG capsule Daily.    Meli Devlin MD   carvedilol (COREG) 12.5 MG tablet TAKE 1 AND 1/2 TABLET BY MOUTH TWO TIMES A DAY 5/9/23   Cooper Romero MD   cetirizine (zyrTEC) 10 MG tablet Take 1 tablet by mouth Daily.    Meli Devlin MD   chlorhexidine (PERIDEX) 0.12 % solution  2/14/23   Meli Devlin MD   cholecalciferol (VITAMIN D3) 25 MCG (1000 UT) tablet Take 1 tablet by mouth Daily.    Meli Devlin MD   clobetasol (TEMOVATE) 0.05 % cream APPLY TO AFFECTED AREA(S) TWO TIMES A DAY 12/27/22   Jacobo Redmond MD   Cranberry 1000 MG capsule Take 1,000 mg by mouth.    Meli Devlin MD   Denta 5000 Plus 1.1 % cream  2/14/23   Meli Devlin MD   estradiol (ESTRACE) 0.1 MG/GM vaginal cream INSERT 2 GRAMS INTO THE VAGINA DAILY  Patient taking differently: Insert 2 g into the vagina 2 (Two) Times a Week. 1/3/23   Jacobo Redmond MD   ezetimibe (ZETIA) 10 MG tablet TAKE ONE TABLET BY MOUTH DAILY 4/17/23   Cooper Romero MD   ferrous sulfate 325 (65 FE) MG tablet Take 1 tablet by mouth Daily With Breakfast. 4/25/22   Jacobo Redmond MD   Fluticasone Furoate-Vilanterol (Breo Ellipta) 200-25 MCG/ACT inhaler Inhale 1 puff Daily. 8/22/23   Brown  WALTER Canseco   furosemide (LASIX) 20 MG tablet TAKE ONE TABLET BY MOUTH TWICE A DAY 5/30/23   Jacobo Redmond MD   glimepiride (AMARYL) 2 MG tablet TAKE ONE TABLET BY MOUTH TWICE A DAY  Patient taking differently: 1 tab PO Q AM if needed 10/1/21   Jacobo Redmond MD   Januvia 50 MG tablet TAKE ONE TABLET BY MOUTH DAILY 4/3/23   Jacobo Redmond MD   losartan (COZAAR) 50 MG tablet TAKE ONE TABLET BY MOUTH TWICE A DAY 9/7/23   Cooper Romero MD   metFORMIN (GLUCOPHAGE) 850 MG tablet TAKE ONE TABLET BY MOUTH TWICE A DAY 5/30/23   Jacobo Redmond MD   montelukast (SINGULAIR) 10 MG tablet  9/16/21   ProviderMeli MD   multivitamin with minerals tablet tablet Take 1 tablet by mouth Daily.    Meli Devlin MD   nitroglycerin (NITROSTAT) 0.4 MG SL tablet 1 under the tongue as needed for angina, may repeat q5mins for up three doses 3/15/22   Rissa Wang APRN   nystatin-triamcinolone (MYCOLOG II) 646595-3.1 UNIT/GM-% cream by Other route See Admin Instructions.    Meli Devlin MD   pantoprazole (PROTONIX) 40 MG EC tablet TAKE ONE TABLET BY MOUTH DAILY 5/1/23   Jacobo Redmond MD   PARoxetine (PAXIL) 40 MG tablet Take 1 tablet by mouth Daily. 1/19/23   Jacobo Redmond MD   potassium chloride (MICRO-K) 10 MEQ CR capsule TAKE ONE CAPSULE BY MOUTH TWICE A DAY WITH FOOD  Patient taking differently: Only if taking water pill 7/11/23   Jacobo Redmond MD   pravastatin (PRAVACHOL) 80 MG tablet TAKE ONE TABLET BY MOUTH DAILY 8/7/23   Cooper Romero MD   witch hazel-glycerin (TUCKS) pad Insert  into the rectum As Needed for Irritation or Hemorrhoids. 8/18/23   Lidia Baird APRN   Zinc 25 MG tablet Take 25 mg by mouth.    ProviderMeli MD        Social History:   Social History     Tobacco Use    Smoking status: Never     Passive exposure: Never    Smokeless tobacco: Never   Vaping Use    Vaping Use: Never used   Substance  "Use Topics    Alcohol use: Never    Drug use: Never         Review of Systems:  Review of Systems   I performed a 10 point review of systems which was all negative, except for the positives found in the HPI above.      Physical Exam:  /100   Pulse 63   Temp 97.1 °F (36.2 °C)   Resp 23   Ht 152.4 cm (60\")   Wt 98.8 kg (217 lb 13 oz)   SpO2 91%   BMI 42.54 kg/m²         Physical Exam   General: Awake alert and in mild respiratory distress at rest.    HEENT: Head normocephalic atraumatic, eyes PERRLA EOMI, nose normal, oropharynx normal.    Neck: Supple full range of motion, no meningismus, no lymphadenopathy    Heart: Regular rate and rhythm, no murmurs or rubs, 2+ radial pulses bilaterally    Lungs:     mild crackles posteriorly, mild respiratory distress with accessory muscle use.    Abdomen: Soft, nontender, nondistended, no rebound or guarding    Skin: Warm, dry, no rash    Musculoskeletal: Normal range of motion, 3+ bilateral pitting lower extremity edema without any calf tenderness    Neurologic: Oriented x3, no motor deficits no sensory deficits    Psychiatric: Mood appears stable, no psychosis          Procedures:  Procedures      Medical Decision Making:      Comorbidities that affect care:    Chronic Kidney Disease, Congestive Heart Failure, Diabetes    External Notes reviewed:    Previous Labs: I reviewed her previous lab work from last month where it looks like today's kidney disease is actually baseline for her and chronic, and also her potassium elevation from last month is now resolved.      The following orders were placed and all results were independently analyzed by me:  Orders Placed This Encounter   Procedures    COVID-19, FLU A/B, RSV PCR - Swab, Nasopharynx    XR Chest 1 View    [place] Draw    Comprehensive Metabolic Panel    BNP    Single High Sensitivity Troponin T    CBC Auto Differential    NPO Diet NPO Type: Strict NPO    Undress & Gown    Continuous Pulse Oximetry    Vital " Signs    Hospitalist (on-call MD unless specified)    Oxygen Therapy- Nasal Cannula; Titrate 1-6 LPM Per SpO2; 90 - 95%    ECG 12 Lead ED Triage Standing Order; SOA    Insert Peripheral IV    CBC & Differential    Green Top (Gel)    Lavender Top    Gold Top - SST    Light Blue Top       Medications Given in the Emergency Department:  Medications   sodium chloride 0.9 % flush 10 mL (has no administration in time range)   furosemide (LASIX) injection 60 mg (60 mg Intravenous Given 10/21/23 1149)        ED Course:    ED Course as of 10/21/23 1230   Sat Oct 21, 2023   1129 EKG: I interpreted her twelve-lead EKG as normal sinus rhythm at 72 bpm, normal P waves, normal QRS and ST segments and T waves; no acute ischemia or ectopy. [VS]      ED Course User Index  [VS] José Miguel Gillespie MD       Labs:    Lab Results (last 24 hours)       Procedure Component Value Units Date/Time    CBC & Differential [[id number]]  (Abnormal) Collected: 10/21/23 1003    Specimen: Blood Updated: 10/21/23 1010    Narrative:      The following orders were created for panel order CBC & Differential.  Procedure                               Abnormality         Status                     ---------                               -----------         ------                     CBC Auto Differential[[id number]]        Abnormal            Final result                 Please view results for these tests on the individual orders.    Comprehensive Metabolic Panel [[id number]]  (Abnormal) Collected: 10/21/23 1003    Specimen: Blood Updated: 10/21/23 1030     Glucose 170 mg/dL      BUN 31 mg/dL      Creatinine 1.08 mg/dL      Sodium 140 mmol/L      Potassium 4.9 mmol/L      Chloride 106 mmol/L      CO2 23.7 mmol/L      Calcium 9.9 mg/dL      Total Protein 7.5 g/dL      Albumin 4.1 g/dL      ALT (SGPT) 11 U/L      AST (SGOT) 8 U/L      Alkaline Phosphatase 66 U/L      Total Bilirubin 0.6 mg/dL      Globulin 3.4 gm/dL      A/G Ratio 1.2 g/dL      BUN/Creatinine  Ratio 28.7     Anion Gap 10.3 mmol/L      eGFR 50.4 mL/min/1.73     Narrative:      GFR Normal >60  Chronic Kidney Disease <60  Kidney Failure <15    The GFR formula is only valid for adults with stable renal function between ages 18 and 70.    BNP [[id number]]  (Normal) Collected: 10/21/23 1003    Specimen: Blood Updated: 10/21/23 1028     proBNP 394.7 pg/mL     Narrative:      This assay is used as an aid in the diagnosis of individuals suspected of having heart failure. It can be used as an aid in the diagnosis of acute decompensated heart failure (ADHF) in patients presenting with signs and symptoms of ADHF to the emergency department (ED). In addition, NT-proBNP of <300 pg/mL indicates ADHF is not likely.    Age Range Result Interpretation  NT-proBNP Concentration (pg/mL:      <50             Positive            >450                   Gray                 300-450                    Negative             <300    50-75           Positive            >900                  Gray                300-900                  Negative            <300      >75             Positive            >1800                  Gray                300-1800                  Negative            <300    Single High Sensitivity Troponin T [[id number]]  (Abnormal) Collected: 10/21/23 1003    Specimen: Blood Updated: 10/21/23 1030     HS Troponin T 15 ng/L     Narrative:      High Sensitive Troponin T Reference Range:  <10.0 ng/L- Negative Female for AMI  <15.0 ng/L- Negative Male for AMI  >=10 - Abnormal Female indicating possible myocardial injury.  >=15 - Abnormal Male indicating possible myocardial injury.   Clinicians would have to utilize clinical acumen, EKG, Troponin, and serial changes to determine if it is an Acute Myocardial Infarction or myocardial injury due to an underlying chronic condition.         CBC Auto Differential [[id number]]  (Abnormal) Collected: 10/21/23 1003    Specimen: Blood Updated: 10/21/23 1010     WBC 9.78 10*3/mm3       RBC 3.70 10*6/mm3      Hemoglobin 10.6 g/dL      Hematocrit 35.5 %      MCV 95.9 fL      MCH 28.6 pg      MCHC 29.9 g/dL      RDW 16.3 %      RDW-SD 56.7 fl      MPV 8.9 fL      Platelets 226 10*3/mm3      Neutrophil % 81.3 %      Lymphocyte % 9.2 %      Monocyte % 5.9 %      Eosinophil % 3.0 %      Basophil % 0.3 %      Immature Grans % 0.3 %      Neutrophils, Absolute 7.95 10*3/mm3      Lymphocytes, Absolute 0.90 10*3/mm3      Monocytes, Absolute 0.58 10*3/mm3      Eosinophils, Absolute 0.29 10*3/mm3      Basophils, Absolute 0.03 10*3/mm3      Immature Grans, Absolute 0.03 10*3/mm3      nRBC 0.0 /100 WBC     COVID-19, FLU A/B, RSV PCR - Swab, Nasopharynx [[id number]]  (Normal) Collected: 10/21/23 1047    Specimen: Swab from Nasopharynx Updated: 10/21/23 1137     COVID19 Not Detected     Influenza A PCR Not Detected     Influenza B PCR Not Detected     RSV, PCR Not Detected    Narrative:      Fact sheet for providers: https://www.fda.gov/media/427446/download    Fact sheet for patients: https://www.fda.gov/media/817145/download    Test performed by PCR.             Imaging:    XR Chest 1 View    Result Date: 10/21/2023  PROCEDURE: XR CHEST 1 VW  COMPARISON: [facility], , XR CHEST 2 VW, 11/17/2021, 9:34.  INDICATIONS: WORSENING SHORTNESS OF BREATH X 2-3 DAYS  FINDINGS:  The heart is enlarged.  There are interstitial changes involving the lungs which are nonspecific.  This could be due to inflammatory infectious process or possibly some edema.  There are no pleural effusions.  There are degenerative changes involving both shoulders.        1. Nonspecific perihilar interstitial changes that could be secondary to inflammatory infectious process or edema. 2. Cardiomegaly 3. Degenerative change both shoulders.       YARED REGALADO MD       Electronically Signed and Approved By: YARED REGALADO MD on 10/21/2023 at 10:21                Differential Diagnosis and Discussion:    Dyspnea: Differential  diagnosis includes but is not limited to metabolic acidosis, neurological disorders, psychogenic, asthma, pneumothorax, upper airway obstruction, COPD, pneumonia, noncardiogenic pulmonary edema, interstitial lung disease, anemia, congestive heart failure, and pulmonary embolism    All labs were reviewed and interpreted by me.  All X-rays impressions were independently interpreted by me.  EKG was interpreted by me.    MDM     Amount and/or Complexity of Data Reviewed  Clinical lab tests: reviewed  Tests in the radiology section of CPT®: reviewed  Tests in the medicine section of CPT®: reviewed  Decide to obtain previous medical records or to obtain history from someone other than the patient: yes           This patient is a pleasant 87-year-old female with history of CKD and CHF now off of her diuretics this month, who presents with worsening dyspnea and acute hypoxia and lower extremity edema.    Her proBNP is only 394 today which is within normal limits, but clinically, it looks like she is in acute congestive heart failure.    She has 3+ pitting edema in both lower legs and chest x-ray shows pulmonary vascular congestion or edema, and she is hypoxic in the 70s on room air.    We placed her on 4 L nasal cannula oxygen and started diuresing her with IV Lasix 60 mg in the ED and plan to admit her for further management.                    Patient Care Considerations:    CT CHEST: I considered ordering a CT scan of the chest, however she is not showing any signs of PE including no tachycardia or pleuritic chest pain or calf tenderness or previous history.      Consultants/Shared Management Plan:    Hospitalist: I have discussed the case with the admitting hospitalist, who agrees to accept the patient for admission.    Social Determinants of Health:    Patient has presented with family members who are responsible, reliable and will ensure follow up care.      Disposition and Care Coordination:    Admit:   Through  independent evaluation of the patient's history, physical, and imperical data, the patient meets criteria for observation/admission to the hospital.        Final diagnoses:   Acute on chronic congestive heart failure, unspecified heart failure type   Acute respiratory failure with hypoxia        ED Disposition       ED Disposition   Decision to Admit    Condition   --    Comment   --               This medical record created using voice recognition software.             José Miguel Gillespie MD  10/21/23 9918

## 2023-10-22 ENCOUNTER — APPOINTMENT (OUTPATIENT)
Dept: GENERAL RADIOLOGY | Facility: HOSPITAL | Age: 88
End: 2023-10-22
Payer: MEDICARE

## 2023-10-22 LAB
ANION GAP SERPL CALCULATED.3IONS-SCNC: 7.6 MMOL/L (ref 5–15)
BUN SERPL-MCNC: 37 MG/DL (ref 8–23)
BUN/CREAT SERPL: 30.6 (ref 7–25)
CALCIUM SPEC-SCNC: 9.5 MG/DL (ref 8.6–10.5)
CHLORIDE SERPL-SCNC: 105 MMOL/L (ref 98–107)
CO2 SERPL-SCNC: 25.4 MMOL/L (ref 22–29)
CREAT SERPL-MCNC: 1.21 MG/DL (ref 0.57–1)
EGFRCR SERPLBLD CKD-EPI 2021: 44 ML/MIN/1.73
GLUCOSE BLDC GLUCOMTR-MCNC: 161 MG/DL (ref 70–99)
GLUCOSE BLDC GLUCOMTR-MCNC: 169 MG/DL (ref 70–99)
GLUCOSE BLDC GLUCOMTR-MCNC: 196 MG/DL (ref 70–99)
GLUCOSE BLDC GLUCOMTR-MCNC: 299 MG/DL (ref 70–99)
GLUCOSE SERPL-MCNC: 210 MG/DL (ref 65–99)
MAGNESIUM SERPL-MCNC: 1.9 MG/DL (ref 1.6–2.4)
POTASSIUM SERPL-SCNC: 5.3 MMOL/L (ref 3.5–5.2)
SODIUM SERPL-SCNC: 138 MMOL/L (ref 136–145)

## 2023-10-22 PROCEDURE — 99232 SBSQ HOSP IP/OBS MODERATE 35: CPT | Performed by: INTERNAL MEDICINE

## 2023-10-22 PROCEDURE — 94799 UNLISTED PULMONARY SVC/PX: CPT

## 2023-10-22 PROCEDURE — 82948 REAGENT STRIP/BLOOD GLUCOSE: CPT

## 2023-10-22 PROCEDURE — 80048 BASIC METABOLIC PNL TOTAL CA: CPT | Performed by: INTERNAL MEDICINE

## 2023-10-22 PROCEDURE — 36415 COLL VENOUS BLD VENIPUNCTURE: CPT | Performed by: INTERNAL MEDICINE

## 2023-10-22 PROCEDURE — 71046 X-RAY EXAM CHEST 2 VIEWS: CPT

## 2023-10-22 PROCEDURE — 63710000001 INSULIN LISPRO (HUMAN) PER 5 UNITS: Performed by: INTERNAL MEDICINE

## 2023-10-22 PROCEDURE — 83735 ASSAY OF MAGNESIUM: CPT | Performed by: INTERNAL MEDICINE

## 2023-10-22 PROCEDURE — 25010000002 FUROSEMIDE PER 20 MG: Performed by: INTERNAL MEDICINE

## 2023-10-22 PROCEDURE — 94664 DEMO&/EVAL PT USE INHALER: CPT

## 2023-10-22 PROCEDURE — 25010000002 ENOXAPARIN PER 10 MG: Performed by: INTERNAL MEDICINE

## 2023-10-22 PROCEDURE — 63710000001 PREDNISONE PER 1 MG: Performed by: INTERNAL MEDICINE

## 2023-10-22 RX ORDER — FLUCONAZOLE 150 MG/1
150 TABLET ORAL ONCE
Status: COMPLETED | OUTPATIENT
Start: 2023-10-22 | End: 2023-10-22

## 2023-10-22 RX ORDER — PREDNISONE 20 MG/1
40 TABLET ORAL DAILY
Status: DISCONTINUED | OUTPATIENT
Start: 2023-10-22 | End: 2023-10-23

## 2023-10-22 RX ORDER — ENOXAPARIN SODIUM 100 MG/ML
40 INJECTION SUBCUTANEOUS EVERY 24 HOURS
Status: DISCONTINUED | OUTPATIENT
Start: 2023-10-22 | End: 2023-10-24 | Stop reason: HOSPADM

## 2023-10-22 RX ORDER — ACETAMINOPHEN 500 MG
1000 TABLET ORAL EVERY 6 HOURS PRN
Status: DISCONTINUED | OUTPATIENT
Start: 2023-10-22 | End: 2023-10-24 | Stop reason: HOSPADM

## 2023-10-22 RX ORDER — GUAIFENESIN 600 MG/1
1200 TABLET, EXTENDED RELEASE ORAL EVERY 12 HOURS SCHEDULED
Status: DISCONTINUED | OUTPATIENT
Start: 2023-10-22 | End: 2023-10-24 | Stop reason: HOSPADM

## 2023-10-22 RX ORDER — FUROSEMIDE 10 MG/ML
40 INJECTION INTRAMUSCULAR; INTRAVENOUS ONCE
Status: COMPLETED | OUTPATIENT
Start: 2023-10-22 | End: 2023-10-22

## 2023-10-22 RX ADMIN — MULTIPLE VITAMINS W/ MINERALS TAB 1 TABLET: TAB at 21:00

## 2023-10-22 RX ADMIN — MONTELUKAST 10 MG: 10 TABLET, FILM COATED ORAL at 08:19

## 2023-10-22 RX ADMIN — ENOXAPARIN SODIUM 40 MG: 100 INJECTION SUBCUTANEOUS at 17:17

## 2023-10-22 RX ADMIN — GUAIFENESIN 1200 MG: 600 TABLET ORAL at 21:00

## 2023-10-22 RX ADMIN — INSULIN LISPRO 2 UNITS: 100 INJECTION, SOLUTION INTRAVENOUS; SUBCUTANEOUS at 17:17

## 2023-10-22 RX ADMIN — AZITHROMYCIN DIHYDRATE 250 MG: 250 TABLET, FILM COATED ORAL at 09:09

## 2023-10-22 RX ADMIN — INSULIN LISPRO 2 UNITS: 100 INJECTION, SOLUTION INTRAVENOUS; SUBCUTANEOUS at 11:45

## 2023-10-22 RX ADMIN — LOSARTAN POTASSIUM 50 MG: 50 TABLET, FILM COATED ORAL at 08:19

## 2023-10-22 RX ADMIN — PANTOPRAZOLE SODIUM 40 MG: 40 TABLET, DELAYED RELEASE ORAL at 08:19

## 2023-10-22 RX ADMIN — FERROUS SULFATE TAB 325 MG (65 MG ELEMENTAL FE) 325 MG: 325 (65 FE) TAB at 20:59

## 2023-10-22 RX ADMIN — IPRATROPIUM BROMIDE AND ALBUTEROL SULFATE 3 ML: .5; 3 SOLUTION RESPIRATORY (INHALATION) at 00:06

## 2023-10-22 RX ADMIN — PAROXETINE HYDROCHLORIDE 40 MG: 20 TABLET, FILM COATED ORAL at 20:59

## 2023-10-22 RX ADMIN — ARFORMOTEROL TARTRATE 15 MCG: 15 SOLUTION RESPIRATORY (INHALATION) at 18:14

## 2023-10-22 RX ADMIN — PRAVASTATIN SODIUM 80 MG: 20 TABLET ORAL at 21:00

## 2023-10-22 RX ADMIN — ASPIRIN 81 MG: 81 TABLET, COATED ORAL at 20:59

## 2023-10-22 RX ADMIN — FUROSEMIDE 40 MG: 10 INJECTION, SOLUTION INTRAMUSCULAR; INTRAVENOUS at 14:51

## 2023-10-22 RX ADMIN — PREDNISONE 40 MG: 20 TABLET ORAL at 11:45

## 2023-10-22 RX ADMIN — INSULIN LISPRO 4 UNITS: 100 INJECTION, SOLUTION INTRAVENOUS; SUBCUTANEOUS at 21:00

## 2023-10-22 RX ADMIN — CARVEDILOL 18.75 MG: 12.5 TABLET, FILM COATED ORAL at 08:19

## 2023-10-22 RX ADMIN — Medication 10 ML: at 08:19

## 2023-10-22 RX ADMIN — CETIRIZINE HYDROCHLORIDE 10 MG: 10 TABLET, FILM COATED ORAL at 08:19

## 2023-10-22 RX ADMIN — AMLODIPINE BESYLATE 5 MG: 5 TABLET ORAL at 08:19

## 2023-10-22 RX ADMIN — Medication 10 ML: at 21:00

## 2023-10-22 RX ADMIN — LOSARTAN POTASSIUM 50 MG: 50 TABLET, FILM COATED ORAL at 20:59

## 2023-10-22 RX ADMIN — IPRATROPIUM BROMIDE AND ALBUTEROL SULFATE 3 ML: .5; 3 SOLUTION RESPIRATORY (INHALATION) at 18:14

## 2023-10-22 RX ADMIN — DOCUSATE SODIUM 50MG AND SENNOSIDES 8.6MG 2 TABLET: 8.6; 5 TABLET, FILM COATED ORAL at 20:59

## 2023-10-22 RX ADMIN — CARVEDILOL 18.75 MG: 12.5 TABLET, FILM COATED ORAL at 20:59

## 2023-10-22 RX ADMIN — IPRATROPIUM BROMIDE AND ALBUTEROL SULFATE 3 ML: .5; 3 SOLUTION RESPIRATORY (INHALATION) at 06:25

## 2023-10-22 RX ADMIN — ARFORMOTEROL TARTRATE 15 MCG: 15 SOLUTION RESPIRATORY (INHALATION) at 06:25

## 2023-10-22 RX ADMIN — IPRATROPIUM BROMIDE AND ALBUTEROL SULFATE 3 ML: .5; 3 SOLUTION RESPIRATORY (INHALATION) at 11:34

## 2023-10-22 RX ADMIN — BUDESONIDE 0.5 MG: 0.5 INHALANT RESPIRATORY (INHALATION) at 06:25

## 2023-10-22 RX ADMIN — GUAIFENESIN 600 MG: 600 TABLET ORAL at 08:19

## 2023-10-22 RX ADMIN — FLUCONAZOLE 150 MG: 150 TABLET ORAL at 11:45

## 2023-10-22 RX ADMIN — Medication 1000 UNITS: at 08:19

## 2023-10-22 RX ADMIN — BUDESONIDE 0.5 MG: 0.5 INHALANT RESPIRATORY (INHALATION) at 18:14

## 2023-10-22 RX ADMIN — INSULIN LISPRO 2 UNITS: 100 INJECTION, SOLUTION INTRAVENOUS; SUBCUTANEOUS at 08:18

## 2023-10-22 NOTE — NURSING NOTE
Primary Nurse Skin assessment on transfer from PCU.     Redness around umbilicus. Bottom is red/pink but blanchable. Bruising on arms. No other issues.

## 2023-10-22 NOTE — PLAN OF CARE
Goal Outcome Evaluation:  Plan of Care Reviewed With: patient        Progress: no change  Outcome Evaluation: Patient A/Ox4. Transfer from PCU. On 2L nasal cannula. No complaints of pain. 1800ml fluid restriction. No other issues/needs at this time.

## 2023-10-22 NOTE — PROGRESS NOTES
[facility]   Hospitalist Progress Note  Date: 10/22/2023  Patient Name: Devi Diallo  : 1935  MRN: [id number]  Date of admission: 10/21/2023      Subjective   Subjective     Chief Complaint: Shortness of breath     Summary:      Devi Diallo is a 87 y.o. female with past medical history for coronary disease, asthma, chronic bronchitis, chronic hypoxic respiratory failure currently on no supplemental O2 who presented to the emergency department on today with complaint of 2 to 3-day history of increasing shortness of breath well as a cough productive of a yellowish sputum.  She denies any hemoptysis.  She denies any fever no chills.  In the emergency department patient was noted to have O2 sats of 72% on room air.  She subsequently was placed on 3 L with improvement to 88% and then O2 was increased to 4 L.  The patient reports that she has been told on her last 2 visits with pulmonology that qualifies for O2 but she has declined getting this.  Also of note the patient reports that she was recently taken off her Lasix approximately 1 month ago secondary to worsening renal dysfunction.  While she does report some swelling in her bilateral ankles she denies that this is any worse than usual.  He denies any orthopnea or any PND at this time.     In the emergency department patient was noted to have a hemoglobin of 10.6 with 81% neutrophils.  CMP showed glucose of 170, BUN of 31 and a creatinine of 1.08.  BNP was noted to be within normal limits.  Troponin was noted to be 15.  Fluvid/RSV is negative.    Interval Followup: Patient seen and evaluated on this morning.  Her only complaint is of cough and chest congestion.  She reports her breathing is better.  No issues reported overnight per patient RN.  Multiple family members at bedside.    Review of Systems   All systems were reviewed and negative except for: As noted in interval follow-up    Objective   Objective     Vitals:   Temp:  [97.1 °F (36.2  °C)-98.6 °F (37 °C)] 97.3 °F (36.3 °C)  Heart Rate:  [58-79] 79  Resp:  [12-23] 20  BP: (128-152)/() 146/64  Flow (L/min):  [2-4] 2  Physical Exam    Constitutional: Awake, alert, no acute distress              Eyes: Pupils equal and reactive, sclerae anicteric, no conjunctival injection              HENT: NCAT, mucous membranes moist              Neck: Supple, no thyromegaly, no lymphadenopathy, trachea midline              Respiratory: Improved air movement bilaterally, clear to auscultation bilaterally, nonlabored respirations               Cardiovascular: RRR, no appreciable murmurs, palpable pedal pulses bilaterally              Gastrointestinal: Positive bowel sounds, soft, nontender, nondistended              Musculoskeletal: No edema, no clubbing or cyanosis to extremities              Psychiatric: Appropriate affect, cooperative              Neurologic: Oriented x 3, moving all extremities equally-no focal weakness, Cranial Nerves grossly intact, speech clear              Skin: No rashes     Result Review    Result Review:  I have personally reviewed the results from the time of this admission to 10/22/2023 09:30 EDT and agree with these findings:  [x]  Laboratory  []  Microbiology  [x]  Radiology  [x]  EKG/Telemetry   []  Cardiology/Vascular   []  Pathology  []  Old records  []  Other:    Assessment & Plan   Assessment / Plan     Assessment/Plan:  Acute exacerbation of chronic bronchitis/asthma  Chronic hypoxic respiratory failure  Chronic diastolic congestive heart failure  Diabetes mellitus type 2  Elevated troponin most likely type II secondary to hypoxia  Hypertension      Continue to monitor on the hospitalist service   Continue DuoNebs every 6 hours scheduled and albuterol nebs as needed.  Continue Brovana/Pulmicort nebs twice daily.  Continue supplemental O2 to maintain sats greater than equal to 89%.  Patient will most likely require arrangements for home O2 prior to discharge.  Status post  Solu-Medrol 40 mg IV x1.  Will add prednisone 40 mg daily x5 days.  We will add Mucinex and flutter valve therapy.  Cardiac enzyme trend is flat  Accu-Cheks will be continued ACHS with sliding scale insulin as needed.  Home medications have been reviewed and resumed as clinically indicated.       Discussed plan with RN.    DVT prophylaxis:  Medical DVT prophylaxis orders are present.    CODE STATUS:          Electronically signed by Dot Hadley MD, 10/22/23, 9:30 AM EDT.

## 2023-10-23 LAB
ANION GAP SERPL CALCULATED.3IONS-SCNC: 9.4 MMOL/L (ref 5–15)
BUN SERPL-MCNC: 38 MG/DL (ref 8–23)
BUN/CREAT SERPL: 31.9 (ref 7–25)
CALCIUM SPEC-SCNC: 9.4 MG/DL (ref 8.6–10.5)
CHLORIDE SERPL-SCNC: 104 MMOL/L (ref 98–107)
CO2 SERPL-SCNC: 25.6 MMOL/L (ref 22–29)
CREAT SERPL-MCNC: 1.19 MG/DL (ref 0.57–1)
EGFRCR SERPLBLD CKD-EPI 2021: 44.9 ML/MIN/1.73
GLUCOSE BLDC GLUCOMTR-MCNC: 165 MG/DL (ref 70–99)
GLUCOSE BLDC GLUCOMTR-MCNC: 250 MG/DL (ref 70–99)
GLUCOSE BLDC GLUCOMTR-MCNC: 266 MG/DL (ref 70–99)
GLUCOSE SERPL-MCNC: 179 MG/DL (ref 65–99)
MAGNESIUM SERPL-MCNC: 2.1 MG/DL (ref 1.6–2.4)
POTASSIUM SERPL-SCNC: 4.7 MMOL/L (ref 3.5–5.2)
SODIUM SERPL-SCNC: 139 MMOL/L (ref 136–145)

## 2023-10-23 PROCEDURE — 25010000002 FUROSEMIDE PER 20 MG: Performed by: INTERNAL MEDICINE

## 2023-10-23 PROCEDURE — 80048 BASIC METABOLIC PNL TOTAL CA: CPT | Performed by: INTERNAL MEDICINE

## 2023-10-23 PROCEDURE — 97161 PT EVAL LOW COMPLEX 20 MIN: CPT

## 2023-10-23 PROCEDURE — 83735 ASSAY OF MAGNESIUM: CPT | Performed by: INTERNAL MEDICINE

## 2023-10-23 PROCEDURE — 82948 REAGENT STRIP/BLOOD GLUCOSE: CPT

## 2023-10-23 PROCEDURE — 94799 UNLISTED PULMONARY SVC/PX: CPT

## 2023-10-23 PROCEDURE — 63710000001 INSULIN LISPRO (HUMAN) PER 5 UNITS: Performed by: INTERNAL MEDICINE

## 2023-10-23 PROCEDURE — 63710000001 PREDNISONE PER 1 MG: Performed by: INTERNAL MEDICINE

## 2023-10-23 PROCEDURE — 36415 COLL VENOUS BLD VENIPUNCTURE: CPT | Performed by: INTERNAL MEDICINE

## 2023-10-23 PROCEDURE — 99232 SBSQ HOSP IP/OBS MODERATE 35: CPT | Performed by: INTERNAL MEDICINE

## 2023-10-23 PROCEDURE — 94618 PULMONARY STRESS TESTING: CPT

## 2023-10-23 PROCEDURE — 25010000002 METHYLPREDNISOLONE PER 40 MG: Performed by: INTERNAL MEDICINE

## 2023-10-23 PROCEDURE — 97165 OT EVAL LOW COMPLEX 30 MIN: CPT

## 2023-10-23 PROCEDURE — 94664 DEMO&/EVAL PT USE INHALER: CPT

## 2023-10-23 PROCEDURE — 25010000002 ENOXAPARIN PER 10 MG: Performed by: INTERNAL MEDICINE

## 2023-10-23 RX ORDER — METHYLPREDNISOLONE SODIUM SUCCINATE 40 MG/ML
40 INJECTION, POWDER, LYOPHILIZED, FOR SOLUTION INTRAMUSCULAR; INTRAVENOUS DAILY
Status: DISCONTINUED | OUTPATIENT
Start: 2023-10-23 | End: 2023-10-24 | Stop reason: HOSPADM

## 2023-10-23 RX ORDER — FUROSEMIDE 20 MG/1
20 TABLET ORAL DAILY
Status: DISCONTINUED | OUTPATIENT
Start: 2023-10-23 | End: 2023-10-24 | Stop reason: HOSPADM

## 2023-10-23 RX ORDER — FUROSEMIDE 10 MG/ML
40 INJECTION INTRAMUSCULAR; INTRAVENOUS ONCE
Status: COMPLETED | OUTPATIENT
Start: 2023-10-23 | End: 2023-10-23

## 2023-10-23 RX ADMIN — BUDESONIDE 0.5 MG: 0.5 INHALANT RESPIRATORY (INHALATION) at 07:16

## 2023-10-23 RX ADMIN — GUAIFENESIN 1200 MG: 600 TABLET ORAL at 21:16

## 2023-10-23 RX ADMIN — PREDNISONE 40 MG: 20 TABLET ORAL at 09:36

## 2023-10-23 RX ADMIN — Medication 10 ML: at 21:15

## 2023-10-23 RX ADMIN — CARVEDILOL 18.75 MG: 12.5 TABLET, FILM COATED ORAL at 09:36

## 2023-10-23 RX ADMIN — METHYLPREDNISOLONE SODIUM SUCCINATE 40 MG: 40 INJECTION INTRAMUSCULAR; INTRAVENOUS at 17:49

## 2023-10-23 RX ADMIN — IPRATROPIUM BROMIDE AND ALBUTEROL SULFATE 3 ML: .5; 3 SOLUTION RESPIRATORY (INHALATION) at 12:01

## 2023-10-23 RX ADMIN — IPRATROPIUM BROMIDE AND ALBUTEROL SULFATE 3 ML: .5; 3 SOLUTION RESPIRATORY (INHALATION) at 07:16

## 2023-10-23 RX ADMIN — FERROUS SULFATE TAB 325 MG (65 MG ELEMENTAL FE) 325 MG: 325 (65 FE) TAB at 21:16

## 2023-10-23 RX ADMIN — PRAVASTATIN SODIUM 80 MG: 20 TABLET ORAL at 21:16

## 2023-10-23 RX ADMIN — IPRATROPIUM BROMIDE AND ALBUTEROL SULFATE 3 ML: .5; 3 SOLUTION RESPIRATORY (INHALATION) at 00:15

## 2023-10-23 RX ADMIN — FUROSEMIDE 20 MG: 20 TABLET ORAL at 11:27

## 2023-10-23 RX ADMIN — PANTOPRAZOLE SODIUM 40 MG: 40 TABLET, DELAYED RELEASE ORAL at 05:54

## 2023-10-23 RX ADMIN — ASPIRIN 81 MG: 81 TABLET, COATED ORAL at 21:16

## 2023-10-23 RX ADMIN — INSULIN LISPRO 4 UNITS: 100 INJECTION, SOLUTION INTRAVENOUS; SUBCUTANEOUS at 21:25

## 2023-10-23 RX ADMIN — CARVEDILOL 18.75 MG: 12.5 TABLET, FILM COATED ORAL at 21:16

## 2023-10-23 RX ADMIN — PAROXETINE HYDROCHLORIDE 40 MG: 20 TABLET, FILM COATED ORAL at 21:16

## 2023-10-23 RX ADMIN — GUAIFENESIN 1200 MG: 600 TABLET ORAL at 09:37

## 2023-10-23 RX ADMIN — INSULIN LISPRO 2 UNITS: 100 INJECTION, SOLUTION INTRAVENOUS; SUBCUTANEOUS at 09:37

## 2023-10-23 RX ADMIN — AZITHROMYCIN DIHYDRATE 250 MG: 250 TABLET, FILM COATED ORAL at 09:37

## 2023-10-23 RX ADMIN — IPRATROPIUM BROMIDE AND ALBUTEROL SULFATE 3 ML: .5; 3 SOLUTION RESPIRATORY (INHALATION) at 19:48

## 2023-10-23 RX ADMIN — Medication 1000 UNITS: at 09:37

## 2023-10-23 RX ADMIN — MONTELUKAST 10 MG: 10 TABLET, FILM COATED ORAL at 09:37

## 2023-10-23 RX ADMIN — Medication 10 ML: at 09:38

## 2023-10-23 RX ADMIN — ARFORMOTEROL TARTRATE 15 MCG: 15 SOLUTION RESPIRATORY (INHALATION) at 19:48

## 2023-10-23 RX ADMIN — LOSARTAN POTASSIUM 50 MG: 50 TABLET, FILM COATED ORAL at 21:16

## 2023-10-23 RX ADMIN — FUROSEMIDE 40 MG: 10 INJECTION, SOLUTION INTRAMUSCULAR; INTRAVENOUS at 17:49

## 2023-10-23 RX ADMIN — AMLODIPINE BESYLATE 5 MG: 5 TABLET ORAL at 09:37

## 2023-10-23 RX ADMIN — ARFORMOTEROL TARTRATE 15 MCG: 15 SOLUTION RESPIRATORY (INHALATION) at 07:16

## 2023-10-23 RX ADMIN — INSULIN LISPRO 4 UNITS: 100 INJECTION, SOLUTION INTRAVENOUS; SUBCUTANEOUS at 17:49

## 2023-10-23 RX ADMIN — CETIRIZINE HYDROCHLORIDE 10 MG: 10 TABLET, FILM COATED ORAL at 09:36

## 2023-10-23 RX ADMIN — INSULIN LISPRO 2 UNITS: 100 INJECTION, SOLUTION INTRAVENOUS; SUBCUTANEOUS at 12:54

## 2023-10-23 RX ADMIN — MULTIPLE VITAMINS W/ MINERALS TAB 1 TABLET: TAB at 21:15

## 2023-10-23 RX ADMIN — ENOXAPARIN SODIUM 40 MG: 100 INJECTION SUBCUTANEOUS at 17:49

## 2023-10-23 RX ADMIN — BUDESONIDE 0.5 MG: 0.5 INHALANT RESPIRATORY (INHALATION) at 19:48

## 2023-10-23 RX ADMIN — DOCUSATE SODIUM 50MG AND SENNOSIDES 8.6MG 2 TABLET: 8.6; 5 TABLET, FILM COATED ORAL at 09:37

## 2023-10-23 NOTE — THERAPY EVALUATION
Patient Name: Devi Diallo  : 1935    MRN: [id number]                              Today's Date: 10/23/2023       Admit Date: 10/21/2023    Visit Dx:     ICD-10-CM ICD-9-CM   1. Acute on chronic congestive heart failure, unspecified heart failure type  I50.9 428.0   2. Acute respiratory failure with hypoxia  J96.01 518.81   3. Decreased activities of daily living (ADL)  Z78.9 V49.89     Patient Active Problem List   Diagnosis    Atherosclerosis of native coronary artery of native heart without angina pectoris    Essential hypertension    Hypercholesterolemia    Stage 2 chronic kidney disease    Iron deficiency anemia    Class 3 severe obesity due to excess calories with serious comorbidity and body mass index (BMI) of 40.0 to 44.9 in adult    Diabetes 1.5, managed as type 2    Nausea    Acute cystitis without hematuria    Vaginal yeast infection    White blood cell disorder    Urge incontinence of urine    Type 2 diabetes mellitus without complication    Seasonal allergic rhinitis    Heart failure    Gastro-esophageal reflux disease without esophagitis    Dizziness and giddiness    Diabetes    Arthritis    Nonrheumatic mitral valve regurgitation    Stage 3b chronic kidney disease    Stage 3a chronic kidney disease    Hemorrhoids    Medicare annual wellness visit, subsequent    Acute exacerbation of CHF (congestive heart failure)     Past Medical History:   Diagnosis Date    Arthritis     Atherosclerotic heart disease of native coronary artery without angina pectoris 2002    cardiac catheterization     Diabetes     Essential hypertension 2012    Hyperlipemia     Hypertension     Pure hypercholesterolemia 2014    Reflux esophagitis     Seasonal allergies     Stage 2 chronic kidney disease 2012     Past Surgical History:   Procedure Laterality Date    COLONOSCOPY      EYE SURGERY      Eye Implant    HYSTERECTOMY        General Information       Row Name 10/23/23 0931          OT Time  and Intention    Document Type evaluation  -AV     Mode of Treatment individual therapy;occupational therapy  -AV       Row Name 10/23/23 0931          General Information    Patient Profile Reviewed yes  -AV     Prior Level of Function independent:;ADL's;transfer;all household mobility  both stands/ sits to bathe (walk-in shower w seat). stands at sink to groom. comfort height commode. ambulates with RW. no home O2.  -AV     Existing Precautions/Restrictions fall;oxygen therapy device and L/min  fluid restriction.  -AV     Barriers to Rehab none identified  -AV       Row Name 10/23/23 0931          Occupational Profile    Reason for Services/Referral (Occupational Profile) Patient is an 87 year old female admitted to [facility] on 10/21/23 with shortness of air and cough. She is currently on 4NT/ on 1.5L O2. OT consulted due to recent decline in ADL/ transfer independence. No previous OT services for current condition.  -AV       Row Name 10/23/23 0931          Living Environment    People in Home child(mayra), adult  daughter  -AV       Row Name 10/23/23 0931          Home Main Entrance    Number of Stairs, Main Entrance none  -AV       Row Name 10/23/23 0931          Stairs Within Home, Primary    Number of Stairs, Within Home, Primary none  -AV       Row Name 10/23/23 0931          Cognition    Orientation Status (Cognition) --  alert, pleasant and cooperative. able to retain information and follow commands.  -AV       Row Name 10/23/23 0931          Safety Issues, Functional Mobility    Impairments Affecting Function (Mobility) balance;endurance/activity tolerance  -AV               User Key  (r) = Recorded By, (t) = Taken By, (c) = Cosigned By      Initials Name Provider Type    AV David Mejía OT Occupational Therapist                     Mobility/ADL's       Row Name 10/23/23 0937          Transfers    Comment, (Transfers) CGA/ RW  -AV       Row Name 10/23/23 0937          Activities of Daily  Living    BADL Assessment/Intervention --  Independent feeding. CGA partial stand at sink. CGA/ min assist bathing and dressing. CGA toilet hygiene (ambulates to bathroom).  -AV               User Key  (r) = Recorded By, (t) = Taken By, (c) = Cosigned By      Initials Name Provider Type    David Aleman OT Occupational Therapist                   Obj/Interventions       Row Name 10/23/23 0939          Sensory Assessment (Somatosensory)    Sensory Assessment (Somatosensory) UE sensation intact  -AV       Row Name 10/23/23 0939          Vision Assessment/Intervention    Visual Impairment/Limitations WFL;corrective lenses full-time  -AV       Row Name 10/23/23 0939          Range of Motion Comprehensive    General Range of Motion bilateral upper extremity ROM WFL  -AV     Comment, General Range of Motion AROM  -AV       Row Name 10/23/23 0939          Strength Comprehensive (MMT)    Comment, General Manual Muscle Testing (MMT) Assessment 4/5 bilateral upper extremities  -AV       Row Name 10/23/23 0939          Motor Skills    Motor Skills coordination;functional endurance  -AV     Coordination WFL  right dominant  -AV     Functional Endurance fair minus  -AV       Row Name 10/23/23 0939          Balance    Comment, Balance CGA/RW  -AV               User Key  (r) = Recorded By, (t) = Taken By, (c) = Cosigned By      Initials Name Provider Type    David Aleman OT Occupational Therapist                   Goals/Plan       Row Name 10/23/23 0943          Transfer Goal 1 (OT)    Activity/Assistive Device (Transfer Goal 1, OT) transfers, all;walker, rolling  -AV     [place] Level/Cues Needed (Transfer Goal 1, OT) modified independence  -AV     Time Frame (Transfer Goal 1, OT) long term goal (LTG);10 days  -AV       Row Name 10/23/23 0943          Bathing Goal 1 (OT)    Activity/Device (Bathing Goal 1, OT) bathing skills, all;shower chair  -AV     [place] Level/Cues Needed (Bathing Goal 1, OT) modified  independence  -AV     Time Frame (Bathing Goal 1, OT) long term goal (LTG);10 days  -AV       Row Name 10/23/23 0943          Dressing Goal 1 (OT)    Activity/Device (Dressing Goal 1, OT) dressing skills, all  -AV     [place]/Cues Needed (Dressing Goal 1, OT) modified independence  -AV     Time Frame (Dressing Goal 1, OT) long term goal (LTG);10 days  -AV       Row Name 10/23/23 0943          Toileting Goal 1 (OT)    Activity/Device (Toileting Goal 1, OT) toileting skills, all;raised toilet seat  -AV     [place] Level/Cues Needed (Toileting Goal 1, OT) modified independence  -AV     Time Frame (Toileting Goal 1, OT) long term goal (LTG);10 days  -AV       Row Name 10/23/23 0943          Grooming Goal 1 (OT)    Activity/Device (Grooming Goal 1, OT) grooming skills, all  -AV     [place] (Grooming Goal 1, OT) modified independence  standing at sink  -AV     Time Frame (Grooming Goal 1, OT) long term goal (LTG);10 days  -AV       Row Name 10/23/23 0943          Problem Specific Goal 1 (OT)    Problem Specific Goal 1 (OT) Patient will demonstrate fair endurance/ activity tolerance needed to support ADLs.  -AV     Time Frame (Problem Specific Goal 1, OT) long term goal (LTG);10 days  -AV       Row Name 10/23/23 0943          Therapy Assessment/Plan (OT)    Planned Therapy Interventions (OT) activity tolerance training;BADL retraining;functional balance retraining;IADL retraining;occupation/activity based interventions;patient/caregiver education/training;transfer/mobility retraining;ROM/therapeutic exercise  -AV               User Key  (r) = Recorded By, (t) = Taken By, (c) = Cosigned By      Initials Name Provider Type    AV David Mejía OT Occupational Therapist                   Clinical Impression       Row Name 10/23/23 0940          Pain Assessment    Additional Documentation Pain Scale: FACES Pre/Post-Treatment (Group)  -AV       Row Name 10/23/23 0940          Pain Scale: FACES Pre/Post-Treatment     Pain: FACES Scale, Pretreatment 0-->no hurt  -AV     Posttreatment Pain Rating 0-->no hurt  -AV       Row Name 10/23/23 0988          Plan of Care Review    Plan of Care Reviewed With patient  -AV     Progress no change  first session: evaluation  -AV     Outcome Evaluation Patient presents with limitations of balance and endurance/ activity tolerance which are impacting ADL/ transfer independence. Skilled OT is indicated to remediate/compensate for deficits to maximize independence and safety with functional tasks.  -AV       Row Name 10/23/23 0970          Therapy Assessment/Plan (OT)    Patient/Family Therapy Goal Statement (OT) regain independence  -AV     Rehab Potential (OT) good, to achieve stated therapy goals  -AV     Criteria for Skilled Therapeutic Interventions Met (OT) yes;meets criteria;skilled treatment is necessary  -AV     Therapy Frequency (OT) 5 times/wk  -AV       Row Name 10/23/23 0952          Therapy Plan Review/Discharge Plan (OT)    Equipment Needs Upon Discharge (OT) commode chair  -AV     Anticipated Discharge Disposition (OT) home with assist;home with home health  -AV       Row Name 10/23/23 0938          Vital Signs    O2 Delivery Pre Treatment nasal cannula  1.5  -AV     O2 Delivery Intra Treatment nasal cannula  1.5  -AV     O2 Delivery Post Treatment nasal cannula  1.5  -AV               User Key  (r) = Recorded By, (t) = Taken By, (c) = Cosigned By      Initials Name Provider Type    AV David Mejía, KAJAL Occupational Therapist                   Outcome Measures       Row Name 10/23/23 9136          How much help from another is currently needed...    Putting on and taking off regular lower body clothing? 3  -AV     Bathing (including washing, rinsing, and drying) 3  -AV     Toileting (which includes using toilet bed pan or urinal) 3  -AV     Putting on and taking off regular upper body clothing 4  -AV     Taking care of personal grooming (such as brushing teeth) 4  -AV      Eating meals 4  -AV     [facility] 6 Clicks Score (OT) 21  -AV       Row Name 10/22/23 2247          How much help from another person do you currently need...    Turning from your back to your side while in flat bed without using bedrails? 4  -KS     Moving from lying on back to sitting on the side of a flat bed without bedrails? 4  -KS     Moving to and from a bed to a chair (including a wheelchair)? 4  -KS     Standing up from a chair using your arms (e.g., wheelchair, bedside chair)? 3  -KS     Climbing 3-5 steps with a railing? 3  -KS     To walk in hospital room? 3  -KS     [facility] 6 Clicks Score (PT) 21  -KS     Highest level of mobility 6 --> Walked 10 steps or more  -KS       Row Name 10/23/23 0944          Functional Assessment    Outcome Measure Options [facility] 6 Clicks Daily Activity (OT);Optimal Instrument  -AV       Row Name 10/23/23 0944          Optimal Instrument    Optimal Instrument Optimal - 3  -AV     Bending/Stooping 2  -AV     Standing 2  -AV     Reaching 1  -AV     From the list, choose the 3 activities you would most like to be able to do without any difficulty Bending/stooping;Standing;Reaching  -AV     Total Score Optimal - 3 5  -AV               User Key  (r) = Recorded By, (t) = Taken By, (c) = Cosigned By      Initials Name Provider Type    Allyson Restrepo, RN Registered Nurse    David Aleman OT Occupational Therapist                    Occupational Therapy Education       Title: PT OT SLP Therapies (In Progress)       Topic: Occupational Therapy (In Progress)       Point: ADL training (Done)       Description:   Instruct learner(s) on proper safety adaptation and remediation techniques during self care or transfers.   Instruct in proper use of assistive devices.                  Learning Progress Summary             Patient Acceptance, E, VU by WILL at 10/23/2023 0922                         Point: Home exercise program (Not Started)       Description:   Instruct learner(s) on  appropriate technique for monitoring, assisting and/or progressing therapeutic exercises/activities.                  Learner Progress:  Not documented in this visit.              Point: Precautions (Not Started)       Description:   Instruct learner(s) on prescribed precautions during self-care and functional transfers.                  Learner Progress:  Not documented in this visit.              Point: Body mechanics (Not Started)       Description:   Instruct learner(s) on proper positioning and spine alignment during self-care, functional mobility activities and/or exercises.                  Learner Progress:  Not documented in this visit.                              User Key       Initials Effective Dates Name Provider Type Discipline     06/16/21 -  David Mejía OT Occupational Therapist OT                  OT Recommendation and Plan  Planned Therapy Interventions (OT): activity tolerance training, BADL retraining, functional balance retraining, IADL retraining, occupation/activity based interventions, patient/caregiver education/training, transfer/mobility retraining, ROM/therapeutic exercise  Therapy Frequency (OT): 5 times/wk  Plan of Care Review  Plan of Care Reviewed With: patient  Progress: no change (first session: evaluation)  Outcome Evaluation: Patient presents with limitations of balance and endurance/ activity tolerance which are impacting ADL/ transfer independence. Skilled OT is indicated to remediate/compensate for deficits to maximize independence and safety with functional tasks.     Time Calculation:   Evaluation Complexity (OT)  Review Occupational Profile/Medical/Therapy History Complexity: expanded/moderate complexity  Assessment, Occupational Performance/Identification of Deficit Complexity: 1-3 performance deficits  Clinical Decision Making Complexity (OT): problem focused assessment/low complexity  Overall Complexity of Evaluation (OT): low complexity     Time Calculation- OT        Row Name 10/23/23 0945             Time Calculation- OT    OT Received On 10/23/23  -AV      OT Goal Re-Cert Due Date 11/01/23  -AV         Untimed Charges    OT Eval/Re-eval Minutes 32  -AV         Total Minutes    Untimed Charges Total Minutes 32  -AV       Total Minutes 32  -AV                User Key  (r) = Recorded By, (t) = Taken By, (c) = Cosigned By      Initials Name Provider Type    AV David Mejía OT Occupational Therapist                  Therapy Charges for Today       Code Description Service Date Service Provider Modifiers Qty    12082295170 HC OT EVAL LOW COMPLEXITY 3 10/23/2023 David Mejía OT GO 1                 David Mejía OT  10/23/2023

## 2023-10-23 NOTE — PROCEDURES
Respiratory Therapist Walking Oximetry Progress Note      Patient Name:  Devi Diallo  YOB: 1935  Date of Procedure: 10/23/23              ROOM AIR BASELINE   SpO2% 92   Heart Rate 68     EXERCISE ON ROOM AIR SpO2% EXERCISE ON O2 @ 6 LPM SpO2%   1 MINUTE 84 1 MINUTE    2 MINUTES . 2 MINUTES                2L 85   3 MINUTES . 3 MINUTES                3L 86   4 MINUTES . 4 MINUTES                4L 87   5 MINUTES . 5 MINUTES                6L 92   6 MINUTES . 6 MINUTES                6L 93              SpO2% Post Exercise  91   HR Post Exercise  78   Time to Recovery  2min     Comments: Pt desatted to 84 and I had to keep increasing o2 until we got to 6L for sats to come up. When patient got back in bed, I left her on 3L nc with sats of 92%          Electronically signed by Mer Palacios, RRT, 10/23/23, 3:59 PM EDT.

## 2023-10-23 NOTE — PROGRESS NOTES
[facility]   Hospitalist Progress Note  Date: 10/23/2023  Patient Name: Devi Diallo  : 1935  MRN: [id number]  Date of admission: 10/21/2023      Subjective   Subjective     Chief Complaint: Shortness of breath     Summary:      Devi Diallo is a 87 y.o. female with past medical history for coronary disease, asthma, chronic bronchitis, chronic hypoxic respiratory failure currently on no supplemental O2 who presented to the emergency department on today with complaint of 2 to 3-day history of increasing shortness of breath well as a cough productive of a yellowish sputum.  She denies any hemoptysis.  She denies any fever no chills.  In the emergency department patient was noted to have O2 sats of 72% on room air.  She subsequently was placed on 3 L with improvement to 88% and then O2 was increased to 4 L.  The patient reports that she has been told on her last 2 visits with pulmonology that qualifies for O2 but she has declined getting this.  Also of note the patient reports that she was recently taken off her Lasix approximately 1 month ago secondary to worsening renal dysfunction.  While she does report some swelling in her bilateral ankles she denies that this is any worse than usual.  He denies any orthopnea or any PND at this time.     In the emergency department patient was noted to have a hemoglobin of 10.6 with 81% neutrophils.  CMP showed glucose of 170, BUN of 31 and a creatinine of 1.08.  BNP was noted to be within normal limits.  Troponin was noted to be 15.  Fluvid/RSV is negative.    Interval Followup: Patient seen and evaluated on this morning.  Patient reports that she is feeling better.  She remains on 2 L nasal cannula and is maintaining oxygenation well on this.  His daughter at bedside.    Review of Systems   All systems were reviewed and negative except for: As noted in interval follow-up    Objective   Objective     Vitals:   Temp:  [97.2 °F (36.2 °C)-98.8 °F (37.1 °C)] 97.7  °F (36.5 °C)  Heart Rate:  [63-88] 73  Resp:  [18-22] 20  BP: (134-156)/(45-51) 139/50  Flow (L/min):  [1.5-2] 1.5  Physical Exam    Constitutional: Awake, alert, and up in the chair at bedside no acute distress              Eyes: Pupils equal and reactive, sclerae anicteric, no conjunctival injection              HENT: NCAT, mucous membranes moist              Neck: Supple, no thyromegaly, no lymphadenopathy, trachea midline              Respiratory: Improved air movement bilaterally, clear to auscultation bilaterally, nonlabored respirations               Cardiovascular: RRR, no appreciable murmurs, palpable pedal pulses bilaterally              Gastrointestinal: Positive bowel sounds, soft, nontender, nondistended              Musculoskeletal: No edema, no clubbing or cyanosis to extremities              Psychiatric: Appropriate affect, cooperative              Neurologic: Oriented x 3, moving all extremities equally-no focal weakness, Cranial Nerves grossly intact, speech clear              Skin: No rashes     Result Review    Result Review:  I have personally reviewed the results from the time of this admission to 10/23/2023 09:05 EDT and agree with these findings:  [x]  Laboratory  []  Microbiology  [x]  Radiology  [x]  EKG/Telemetry   []  Cardiology/Vascular   []  Pathology  []  Old records  []  Other:    Assessment & Plan   Assessment / Plan     Assessment/Plan:  Acute exacerbation of chronic bronchitis/asthma  Acute on chronic hypoxic respiratory failure (however previously declined O2)  Chronic diastolic congestive heart failure  Diabetes mellitus type 2  Elevated troponin most likely type II secondary to hypoxia  Hypertension      Continue to monitor on the hospitalist service   Continue DuoNebs every 6 hours scheduled and albuterol nebs as needed.  Continue Brovana/Pulmicort nebs twice daily.  Continue supplemental O2 to maintain sats greater than equal to 89%.  Patient will most likely require  arrangements for home O2 prior to discharge.  We will resume Solu-Medrol 40 mg IV daily.  Continue prednisone for now.    We will continue Mucinex and flutter valve therapy.  Cardiac enzyme trend is flat  Accu-Cheks will be continued ACHS with sliding scale insulin as needed.  Home medications have been reviewed and resumed as clinically indicated.     Although BNP was within normal limits and there is no significant peripheral edema we will give another dose of IV Lasix 40 mg x 1 on today given the persistent findings on the chest x-ray and patient with apparent desat to the mid 80s requiring 6 L nasal cannula on her desat study.      Discussed plan with RN.    DVT prophylaxis:  Medical DVT prophylaxis orders are present.    CODE STATUS:   Level Of Support Discussed With: Patient  Code Status (Patient has no pulse and is not breathing): CPR (Attempt to Resuscitate)  Medical Interventions (Patient has pulse or is breathing): Full Support      Electronically signed by Dot Hadley MD, 10/23/23, 9:09 AM EDT.

## 2023-10-23 NOTE — CONSULTS
Discharge Planning Assessment   [place]     Patient Name: Devi Diallo  MRN: [id number]  Today's Date: 10/23/2023    Admit Date: 10/21/2023   Discharge Needs Assessment       Row Name 10/23/23 1611       Living Environment    People in Home child(mayra), adult    Current Living Arrangements home    Potentially Unsafe Housing Conditions none    Primary Care Provided by self;child(mayra)    Provides Primary Care For no one    Family Caregiver if Needed child(mayra), adult    Quality of Family Relationships supportive;involved;helpful    Able to Return to Prior Arrangements yes       Resource/Environmental Concerns    Resource/Environmental Concerns none       Transition Planning    Patient/Family Anticipates Transition to home with family    Patient/Family Anticipated Services at Transition durable medical equipment       Discharge Needs Assessment    Readmission Within the Last 30 Days no previous admission in last 30 days    Concerns to be Addressed basic needs;discharge planning    Anticipated Changes Related to Illness none    Equipment Needed After Discharge oxygen              Discharge Plan       Row Name 10/23/23 1612                Patient/Family in Agreement with Plan yes    Final Discharge Disposition Code 01 - home or self-care    Final Note Pt with orders to discharge home today. SW met with pt and daughter at bedside to assess needs. Pt lives at home with family and is fairly independent in ADLs. Pt uses a walker at baseline. Pt denies the need for rehab/HHC at this time. Pt failed 6MWT requiring home 02 to be arranged, agreeable to use [facility]. [facility] notified. No additional needs noted at this time.              Demographic Summary       Row Name 10/23/23 1610       General Information    Admission Type inpatient    Arrived From emergency department    Referral Source admission list    Reason for Consult discharge planning    Preferred Language English       Contact Information    Permission Granted  to Share Info With family/designee              Functional Status       Row Name 10/23/23 1610       Functional Status    Usual Activity Tolerance good    Current Activity Tolerance good       Functional Status, IADL    Medications independent    Meal Preparation independent    Housekeeping independent    Laundry independent    Shopping independent       Mental Status    General Appearance WDL WDL       Mental Status Summary    Recent Changes in Mental Status/Cognitive Functioning no changes              Psychosocial       Row Name 10/23/23 1611       Behavior WDL    Behavior WDL WDL       Emotion Mood WDL    Emotion/Mood/Affect WDL WDL       Speech WDL    Speech WDL WDL       Perceptual State WDL    Perceptual State WDL WDL       Thought Process WDL    Thought Process WDL WDL       Intellectual Performance WDL    Intellectual Performance WDL WDL       Coping/Stress    Sources of Support adult child(mayra)    Techniques to [place] with Loss/Stress/Change not applicable    Reaction to Health Status accepting    Understanding of Condition and Treatment adequate understanding of medical condition       Developmental Stage (Eriksson's)    Developmental Stage Stage 8 (65 years-death/Late Adulthood) Integrity vs. Despair           KAREN Lin

## 2023-10-23 NOTE — PLAN OF CARE
Goal Outcome Evaluation:  Plan of Care Reviewed With: patient        Progress: no change  Outcome Evaluation: Patient presents with no physical limitations that impede her ability to return home with assist from family as needed.  Patient encouraged to continue ambulating with supervision as tolerated using rolling walker.  It is recommended patient receive 6-minute walk test due to new use of supplemental oxygen.  Patient will be discharged from physical therapy caseload.      Anticipated Discharge Disposition (PT): home with home health, home, home with assist

## 2023-10-23 NOTE — THERAPY EVALUATION
Acute Care - Physical Therapy Initial Evaluation  RYAN Lerner     Patient Name: Devi Diallo  : 1935  MRN: [id number]  Today's Date: 10/23/2023      Visit Dx:     ICD-10-CM ICD-9-CM   1. Acute on chronic congestive heart failure, unspecified heart failure type  I50.9 428.0   2. Acute respiratory failure with hypoxia  J96.01 518.81   3. Decreased activities of daily living (ADL)  Z78.9 V49.89   4. Difficulty walking  R26.2 719.7     Patient Active Problem List   Diagnosis    Atherosclerosis of native coronary artery of native heart without angina pectoris    Essential hypertension    Hypercholesterolemia    Stage 2 chronic kidney disease    Iron deficiency anemia    Class 3 severe obesity due to excess calories with serious comorbidity and body mass index (BMI) of 40.0 to 44.9 in adult    Diabetes 1.5, managed as type 2    Nausea    Acute cystitis without hematuria    Vaginal yeast infection    White blood cell disorder    Urge incontinence of urine    Type 2 diabetes mellitus without complication    Seasonal allergic rhinitis    Heart failure    Gastro-esophageal reflux disease without esophagitis    Dizziness and giddiness    Diabetes    Arthritis    Nonrheumatic mitral valve regurgitation    Stage 3b chronic kidney disease    Stage 3a chronic kidney disease    Hemorrhoids    Medicare annual wellness visit, subsequent    Acute exacerbation of CHF (congestive heart failure)     Past Medical History:   Diagnosis Date    Arthritis     Atherosclerotic heart disease of native coronary artery without angina pectoris 2002    cardiac catheterization     Diabetes     Essential hypertension 2012    Hyperlipemia     Hypertension     Pure hypercholesterolemia 2014    Reflux esophagitis     Seasonal allergies     Stage 2 chronic kidney disease 2012     Past Surgical History:   Procedure Laterality Date    COLONOSCOPY      EYE SURGERY      Eye Implant    HYSTERECTOMY       PT Assessment (last 12  hours)       PT Evaluation and Treatment       Row Name 10/23/23 1500          Physical Therapy Time and Intention    Subjective Information no complaints  -CS     Document Type evaluation  -CS     Mode of Treatment individual therapy;physical therapy  -CS     Patient Effort good  -CS     Symptoms Noted During/After Treatment none  -CS       Row Name 10/23/23 1500          General Information    Patient Profile Reviewed yes  -CS     Patient Observations alert;cooperative;agree to therapy  -CS     Prior Level of Function independent:;all household mobility;gait;transfer;bed mobility;ADL's  has a walk-in shower with seat and grab bars; her daughter is at the house to assist with any needs  -CS     Equipment Currently Used at Home rollator;shower chair  no home oxygen  -CS     Existing Precautions/Restrictions fall;oxygen therapy device and L/min  2L  -CS     Limitations/Impairments hearing  -CS     Barriers to Rehab none identified  -CS       Row Name 10/23/23 1500          Living Environment    Current Living Arrangements home  -CS     Home Accessibility stairs to enter home;stairs within home  -CS     People in Home child(mayra), adult  Daughter  -CS     Primary Care Provided by self;child(mayra)  -CS       Row Name 10/23/23 1500          Home Main Entrance    Number of Stairs, Main Entrance none  -CS       Row Name 10/23/23 1500          Stairs Within Home, Primary    Number of Stairs, Within Home, Primary none  -CS       Row Name 10/23/23 1500          Pain    Pretreatment Pain Rating 0/10 - no pain  -CS     Posttreatment Pain Rating 0/10 - no pain  -CS       Row Name 10/23/23 1500          Cognition    Orientation Status (Cognition) oriented x 3  -CS       Row Name 10/23/23 1500          Range of Motion Comprehensive    General Range of Motion bilateral lower extremity ROM WFL  -CS       Row Name 10/23/23 1500          Strength Comprehensive (MMT)    General Manual Muscle Testing (MMT) Assessment no strength deficits  identified  -       Row Name 10/23/23 1500          Bed Mobility    Bed Mobility bed mobility (all) activities  -     All Activities, Dane (Bed Mobility) modified independence  -CS     Assistive Device (Bed Mobility) bed rails  -       Row Name 10/23/23 1500          Transfers    Comment, (Transfers) Pt able to complete all functional transfers at a modified independent level using rolling walker.  -       Row Name 10/23/23 1500          Gait/Stairs (Locomotion)    Gait/Stairs Locomotion gait/ambulation assistive device  -     Dane Level (Gait) supervision  -CS     Assistive Device (Gait) walker, front-wheeled  -     Distance in Feet (Gait) 40  -     Pattern (Gait) step-through  -     Deviations/Abnormal Patterns (Gait) gait speed decreased  -       Row Name 10/23/23 1500          Safety Issues, Functional Mobility    Impairments Affecting Function (Mobility) endurance/activity tolerance;balance  -       Row Name 10/23/23 1500          Balance    Balance Assessment standing dynamic balance  -     Dynamic Standing Balance supervision  -CS     Position/Device Used, Standing Balance supported;walker, front-wheeled  -       Row Name 10/23/23 1500          Plan of Care Review    Plan of Care Reviewed With patient  -     Progress no change  -     Outcome Evaluation Patient presents with no physical limitations that impede her ability to return home with assist from family as needed.  Patient encouraged to continue ambulating with supervision as tolerated using rolling walker.  It is recommended patient receive 6-minute walk test due to new use of supplemental oxygen.  Patient will be discharged from physical therapy caseload.  -       Row Name 10/23/23 1500          Therapy Assessment/Plan (PT)    Criteria for Skilled Interventions Met (PT) no problems identified which require skilled intervention  -     Therapy Frequency (PT) evaluation only  -       Row Name 10/23/23  1500          PT Evaluation Complexity    History, PT Evaluation Complexity 1-2 personal factors and/or comorbidities  -CS     Examination of Body Systems (PT Eval Complexity) total of 4 or more elements  -CS     Clinical Presentation (PT Evaluation Complexity) stable  -CS     Clinical Decision Making (PT Evaluation Complexity) low complexity  -CS     Overall Complexity (PT Evaluation Complexity) low complexity  -CS       Row Name 10/23/23 1500          Therapy Plan Review/Discharge Plan (PT)    Therapy Plan Review (PT) evaluation/treatment results reviewed;patient  -CS               User Key  (r) = Recorded By, (t) = Taken By, (c) = Cosigned By      Initials Name Provider Type    CS Miryam Sorenson PT Physical Therapist                    Physical Therapy Education       Title: PT OT SLP Therapies (In Progress)       Topic: Physical Therapy (In Progress)       Point: Mobility training (Done)       Learning Progress Summary             Patient Acceptance, E,TB, VU by  at 10/23/2023 1543                         Point: Home exercise program (Not Started)       Learner Progress:  Not documented in this visit.              Point: Body mechanics (Not Started)       Learner Progress:  Not documented in this visit.              Point: Precautions (Done)       Learning Progress Summary             Patient Acceptance, E,TB, VU by  at 10/23/2023 1543                                         User Key       Initials Effective Dates Name Provider Type Discipline     04/25/21 -  Miryam Sorenson PT Physical Therapist PT                  PT Recommendation and Plan  Anticipated Discharge Disposition (PT): home with home health, home, home with assist  Therapy Frequency (PT): evaluation only  Plan of Care Reviewed With: patient  Progress: no change  Outcome Evaluation: Patient presents with no physical limitations that impede her ability to return home with assist from family as needed.  Patient encouraged to continue  ambulating with supervision as tolerated using rolling walker.  It is recommended patient receive 6-minute walk test due to new use of supplemental oxygen.  Patient will be discharged from physical therapy caseload.   Outcome Measures       Row Name 10/23/23 1500             How much help from another person do you currently need...    Turning from your back to your side while in flat bed without using bedrails? 4  -CS      Moving from lying on back to sitting on the side of a flat bed without bedrails? 4  -CS      Moving to and from a bed to a chair (including a wheelchair)? 4  -CS      Standing up from a chair using your arms (e.g., wheelchair, bedside chair)? 4  -CS      Climbing 3-5 steps with a railing? 3  -CS      To walk in hospital room? 3  -CS      AM-PAC 6 Clicks Score (PT) 22  -CS      Highest level of mobility 7 --> Walked 25 feet or more  -CS         Functional Assessment    Outcome Measure Options AM-PAC 6 Clicks Basic Mobility (PT)  -CS                User Key  (r) = Recorded By, (t) = Taken By, (c) = Cosigned By      Initials Name Provider Type    Miryam Becerra PT Physical Therapist                     Time Calculation:    PT Charges       Row Name 10/23/23 1526             Time Calculation    PT Received On 10/23/23  -CS         Untimed Charges    PT Eval/Re-eval Minutes 35  -CS         Total Minutes    Untimed Charges Total Minutes 35  -CS       Total Minutes 35  -CS                User Key  (r) = Recorded By, (t) = Taken By, (c) = Cosigned By      Initials Name Provider Type    Miryam Becerra PT Physical Therapist                  Therapy Charges for Today       Code Description Service Date Service Provider Modifiers Qty    [id number]  PT EVAL LOW COMPLEXITY 3 10/23/2023 Miryam Sorenson PT GP 1            PT G-Codes  Outcome Measure Options: AM-PAC 6 Clicks Basic Mobility (PT)  AM-PAC 6 Clicks Score (PT): 22  AM-PAC 6 Clicks Score (OT): 21    Miryam Sorenson PT  10/23/2023

## 2023-10-23 NOTE — PLAN OF CARE
Goal Outcome Evaluation:  Plan of Care Reviewed With: patient        Progress: no change (first session: evaluation)  Outcome Evaluation: Patient presents with limitations of balance and endurance/ activity tolerance which are impacting ADL/ transfer independence. Skilled OT is indicated to remediate/compensate for deficits to maximize independence and safety with functional tasks.      Anticipated Discharge Disposition (OT): home with assist, home with home health

## 2023-10-23 NOTE — PLAN OF CARE
Goal Outcome Evaluation:  Plan of Care Reviewed With: patient        Progress: no change  Outcome Evaluation: Pt remains SOA with exertion. Walk test performed by RT. Pt hopeful to go home tomorrow. Pt in no apparent distress at this time, denies any needs currently, call light in reach.

## 2023-10-24 ENCOUNTER — READMISSION MANAGEMENT (OUTPATIENT)
Dept: CALL CENTER | Facility: HOSPITAL | Age: 88
End: 2023-10-24
Payer: MEDICARE

## 2023-10-24 ENCOUNTER — TELEPHONE (OUTPATIENT)
Dept: CARDIOLOGY | Facility: CLINIC | Age: 88
End: 2023-10-24
Payer: MEDICARE

## 2023-10-24 VITALS
DIASTOLIC BLOOD PRESSURE: 59 MMHG | OXYGEN SATURATION: 93 % | BODY MASS INDEX: 42.24 KG/M2 | RESPIRATION RATE: 18 BRPM | HEIGHT: 60 IN | WEIGHT: 215.17 LBS | TEMPERATURE: 98.2 F | SYSTOLIC BLOOD PRESSURE: 154 MMHG | HEART RATE: 99 BPM

## 2023-10-24 PROBLEM — I50.9 ACUTE EXACERBATION OF CHF (CONGESTIVE HEART FAILURE): Status: RESOLVED | Noted: 2023-10-21 | Resolved: 2023-10-24

## 2023-10-24 LAB
BACTERIA UR QL AUTO: ABNORMAL /HPF
BILIRUB UR QL STRIP: NEGATIVE
CLARITY UR: CLEAR
COLOR UR: YELLOW
GLUCOSE BLDC GLUCOMTR-MCNC: 155 MG/DL (ref 70–99)
GLUCOSE BLDC GLUCOMTR-MCNC: 258 MG/DL (ref 70–99)
GLUCOSE BLDC GLUCOMTR-MCNC: 289 MG/DL (ref 70–99)
GLUCOSE UR STRIP-MCNC: NEGATIVE MG/DL
HGB UR QL STRIP.AUTO: NEGATIVE
HYALINE CASTS UR QL AUTO: ABNORMAL /LPF
KETONES UR QL STRIP: NEGATIVE
LEUKOCYTE ESTERASE UR QL STRIP.AUTO: ABNORMAL
NITRITE UR QL STRIP: NEGATIVE
PH UR STRIP.AUTO: <=5 [PH] (ref 5–8)
PROT UR QL STRIP: NEGATIVE
RBC # UR STRIP: ABNORMAL /HPF
REF LAB TEST METHOD: ABNORMAL
SP GR UR STRIP: 1.01 (ref 1–1.03)
SQUAMOUS #/AREA URNS HPF: ABNORMAL /HPF
STARCH GRANULES URNS QL MICRO: ABNORMAL /HPF
TRANS CELLS #/AREA URNS HPF: ABNORMAL /HPF
UROBILINOGEN UR QL STRIP: ABNORMAL
WBC # UR STRIP: ABNORMAL /HPF

## 2023-10-24 PROCEDURE — 25010000002 METHYLPREDNISOLONE PER 40 MG: Performed by: INTERNAL MEDICINE

## 2023-10-24 PROCEDURE — 94799 UNLISTED PULMONARY SVC/PX: CPT

## 2023-10-24 PROCEDURE — 94664 DEMO&/EVAL PT USE INHALER: CPT

## 2023-10-24 PROCEDURE — 94618 PULMONARY STRESS TESTING: CPT

## 2023-10-24 PROCEDURE — 82948 REAGENT STRIP/BLOOD GLUCOSE: CPT

## 2023-10-24 PROCEDURE — 81001 URINALYSIS AUTO W/SCOPE: CPT | Performed by: INTERNAL MEDICINE

## 2023-10-24 PROCEDURE — 99239 HOSP IP/OBS DSCHRG MGMT >30: CPT | Performed by: INTERNAL MEDICINE

## 2023-10-24 PROCEDURE — 87086 URINE CULTURE/COLONY COUNT: CPT | Performed by: INTERNAL MEDICINE

## 2023-10-24 PROCEDURE — 63710000001 INSULIN LISPRO (HUMAN) PER 5 UNITS: Performed by: INTERNAL MEDICINE

## 2023-10-24 RX ORDER — FUROSEMIDE 20 MG/1
20 TABLET ORAL DAILY
Qty: 30 TABLET | Refills: 0 | Status: SHIPPED | OUTPATIENT
Start: 2023-10-25 | End: 2023-11-24

## 2023-10-24 RX ORDER — PREDNISONE 20 MG/1
40 TABLET ORAL DAILY
Qty: 6 TABLET | Refills: 0 | Status: SHIPPED | OUTPATIENT
Start: 2023-10-25 | End: 2023-10-28

## 2023-10-24 RX ORDER — AZITHROMYCIN 250 MG/1
TABLET, FILM COATED ORAL
Qty: 1 TABLET | Refills: 0 | Status: SHIPPED | OUTPATIENT
Start: 2023-10-25 | End: 2023-10-30

## 2023-10-24 RX ORDER — ALBUTEROL SULFATE 90 UG/1
2 AEROSOL, METERED RESPIRATORY (INHALATION) EVERY 4 HOURS PRN
Qty: 18 G | Refills: 11 | Status: SHIPPED | OUTPATIENT
Start: 2023-10-24 | End: 2023-11-23

## 2023-10-24 RX ORDER — ALBUTEROL SULFATE 1.25 MG/3ML
1 SOLUTION RESPIRATORY (INHALATION) EVERY 6 HOURS PRN
Qty: 360 ML | Refills: 12 | Status: SHIPPED | OUTPATIENT
Start: 2023-10-24 | End: 2023-11-23

## 2023-10-24 RX ADMIN — CARVEDILOL 18.75 MG: 12.5 TABLET, FILM COATED ORAL at 08:48

## 2023-10-24 RX ADMIN — INSULIN LISPRO 4 UNITS: 100 INJECTION, SOLUTION INTRAVENOUS; SUBCUTANEOUS at 08:50

## 2023-10-24 RX ADMIN — Medication 1000 UNITS: at 08:49

## 2023-10-24 RX ADMIN — IPRATROPIUM BROMIDE AND ALBUTEROL SULFATE 3 ML: .5; 3 SOLUTION RESPIRATORY (INHALATION) at 00:05

## 2023-10-24 RX ADMIN — CETIRIZINE HYDROCHLORIDE 10 MG: 10 TABLET, FILM COATED ORAL at 08:49

## 2023-10-24 RX ADMIN — ARFORMOTEROL TARTRATE 15 MCG: 15 SOLUTION RESPIRATORY (INHALATION) at 06:42

## 2023-10-24 RX ADMIN — INSULIN LISPRO 4 UNITS: 100 INJECTION, SOLUTION INTRAVENOUS; SUBCUTANEOUS at 12:33

## 2023-10-24 RX ADMIN — LOSARTAN POTASSIUM 50 MG: 50 TABLET, FILM COATED ORAL at 08:48

## 2023-10-24 RX ADMIN — AZITHROMYCIN DIHYDRATE 250 MG: 250 TABLET, FILM COATED ORAL at 08:51

## 2023-10-24 RX ADMIN — IPRATROPIUM BROMIDE AND ALBUTEROL SULFATE 3 ML: .5; 3 SOLUTION RESPIRATORY (INHALATION) at 06:42

## 2023-10-24 RX ADMIN — GUAIFENESIN 1200 MG: 600 TABLET ORAL at 08:50

## 2023-10-24 RX ADMIN — PANTOPRAZOLE SODIUM 40 MG: 40 TABLET, DELAYED RELEASE ORAL at 06:16

## 2023-10-24 RX ADMIN — AMLODIPINE BESYLATE 5 MG: 5 TABLET ORAL at 08:48

## 2023-10-24 RX ADMIN — FUROSEMIDE 20 MG: 20 TABLET ORAL at 08:50

## 2023-10-24 RX ADMIN — BUDESONIDE 0.5 MG: 0.5 INHALANT RESPIRATORY (INHALATION) at 06:42

## 2023-10-24 RX ADMIN — IPRATROPIUM BROMIDE AND ALBUTEROL SULFATE 3 ML: .5; 3 SOLUTION RESPIRATORY (INHALATION) at 13:51

## 2023-10-24 RX ADMIN — METHYLPREDNISOLONE SODIUM SUCCINATE 40 MG: 40 INJECTION INTRAMUSCULAR; INTRAVENOUS at 08:50

## 2023-10-24 RX ADMIN — MONTELUKAST 10 MG: 10 TABLET, FILM COATED ORAL at 08:49

## 2023-10-24 RX ADMIN — Medication 10 ML: at 08:51

## 2023-10-24 NOTE — DISCHARGE INSTR - LAB
Follow up with Lidia Oct 30 @2:00  Follow up with Dr Cooper Romero @ [phone] office will call

## 2023-10-24 NOTE — TELEPHONE ENCOUNTER
Caller: JESÚS RUEDA    Relationship to patient:     Best call back number: [phone] PATIENT'S NUMBER    Chief complaint: ACUTE EXACERBATION OF CHF, HTN, CHRONIC DIASTOLIC CHF    Type of visit: HOSPITAL FOLLOW UP    Requested date: UNSURE, HOSPITAL NOTE DOESN'T SPECIFY.      If rescheduling, when is the original appointment:       Additional notes:

## 2023-10-24 NOTE — NURSING NOTE
Exercise Oximetry    Patient Name:Devi Diallo   MRN: [id number]   Date: 10/24/23             ROOM AIR BASELINE   SpO2% 85   Heart Rate    Blood Pressure      EXERCISE ON ROOM AIR SpO2% EXERCISE ON O2 @ 3 LPM SpO2%   1 MINUTE na 1 MINUTE 92   2 MINUTES  2 MINUTES 88   3 MINUTES  3 MINUTES 89   4 MINUTES  4 MINUTES 89   5 MINUTES  5 MINUTES 90   6 MINUTES  6 MINUTES 90              Distance Walked  50 ft Distance Walked   Dyspnea ([place] Scale)   Dyspnea ([place] Scale)   Fatigue ([place] Scale)   Fatigue ([place] Scale)   SpO2% Post Exercise   SpO2% Post Exercise   HR Post Exercise   HR Post Exercise   Time to Recovery   Time to Recovery     Comments:

## 2023-10-24 NOTE — SIGNIFICANT NOTE
10/24/23 1315   Coping/Psychosocial   Observed Emotional State calm;cooperative   Verbalized Emotional State hopefulness   Trust Relationship/Rapport empathic listening provided   Family/Support Persons daughter   Involvement in Care interacting with patient   Additional Documentation Spiritual Care (Group)   Spiritual Care   Use of Spiritual Resources non-Jain use of spiritual care   Spiritual Care Source  initiative   Spiritual Care Follow-Up follow-up, none required as presently assessed   Response to Spiritual Care receptive of support   Spiritual Care Interventions supportive conversation provided   Spiritual Care Visit Type initial   Receptivity to Spiritual Care visit welcomed

## 2023-10-24 NOTE — PLAN OF CARE
Goal Outcome Evaluation:  Plan of Care Reviewed With: patient              Patient discharging home with daughters

## 2023-10-24 NOTE — PLAN OF CARE
Goal Outcome Evaluation:  Plan of Care Reviewed With: patient        Progress: no change  Outcome Evaluation: Pt denied pain/discomfort this shift. Pt was able to rest well with no apparent distress. No new issues or new needs noted at this time.

## 2023-10-24 NOTE — DISCHARGE SUMMARY
[facility]        HOSPITALIST  DISCHARGE SUMMARY    Patient Name: Devi Diallo  : 1935  MRN: [id number]    Date of Admission: 10/21/2023  Date of Discharge:  10/24/2023  Primary Care Physician: Jacobo Redmond MD    Consults       Date and Time Order Name Status Description    10/21/2023 11:44 AM Hospitalist (on-call MD unless specified)              Active and Resolved Hospital Problems:  Active Hospital Problems   No active problems to display.      Resolved Hospital Problems    Diagnosis POA    **Acute exacerbation of CHF (congestive heart failure) [I50.9] Yes   Acute exacerbation of chronic bronchitis/asthma  Acute on chronic hypoxia (however previously declined O2)  Chronic diastolic congestive heart failure  Diabetes mellitus type 2  Elevated troponin most likely type II secondary to hypoxia  Hypertension   Obesity BMI 42    Hospital Course     Hospital Course:  Devi Diallo is a 87 y.o. female with past medical history for coronary disease, asthma, chronic bronchitis, chronic hypoxic respiratory failure currently on no supplemental O2 who presented to the emergency department on today with complaint of 2 to 3-day history of increasing shortness of breath well as a cough productive of a yellowish sputum.  She denies any hemoptysis.  She denies any fever no chills.  In the emergency department patient was noted to have O2 sats of 72% on room air.  She subsequently was placed on 3 L with improvement to 88% and then O2 was increased to 4 L.  The patient reports that she has been told on her last 2 visits with pulmonology that qualifies for O2 but she has declined getting this.  Also of note the patient reports that she was recently taken off her Lasix approximately 1 month ago secondary to worsening renal dysfunction.  While she does report some swelling in her bilateral ankles she denies that this is any worse than usual.  He denies any orthopnea or any PND at this  time.     In the emergency department patient was noted to have a hemoglobin of 10.6 with 81% neutrophils.  CMP showed glucose of 170, BUN of 31 and a creatinine of 1.08.  BNP was noted to be within normal limits.  Troponin was noted to be 15.  Fluvid/RSV is negative.     Interval Followup: Patient seen and evaluated on this morning.  Patient reports that she is feeling better.  She remains on 2 L nasal cannula and is maintaining oxygenation well on this.  Patient failed walk test even at rest she dropped to 85%  Patient now willing to use oxygen at home  Discussed with daughter at bedside.  Complaining of somewhat discomfort with a urine.  Does not feel like blood in urine infection      DISCHARGE Follow Up Recommendations for labs and diagnostics: Use oxygen at home.  Follow with PCP, cardiology and pulmonary.  PCP to follow-up on results of urinalysis.      Day of Discharge     Vital Signs:  Temp:  [97.5 °F (36.4 °C)-98.2 °F (36.8 °C)] 98.2 °F (36.8 °C)  Heart Rate:  [82-92] 88  Resp:  [16-18] 18  BP: (125-155)/(58-71) 138/63  Flow (L/min):  [3] 3    Physical Exam:   Constitutional: Awake, alert, and up in the chair at bedside no acute distress              Eyes: Pupils equal and reactive, sclerae anicteric, no conjunctival injection              HENT: NCAT, mucous membranes moist              Neck: Supple, no thyromegaly, no lymphadenopathy, trachea midline              Respiratory: Improved air movement bilaterally, clear to auscultation bilaterally, nonlabored respirations               Cardiovascular: RRR, no appreciable murmurs, palpable pedal pulses bilaterally              Gastrointestinal: Positive bowel sounds, soft, nontender, nondistended              Musculoskeletal: No edema, no clubbing or cyanosis to extremities              Psychiatric: Appropriate affect, cooperative              Neurologic: Oriented x 3, moving all extremities equally-no focal weakness, Cranial Nerves grossly intact, speech  clear              Skin: No rashes     Discharge Details        Discharge Medications        New Medications        Instructions Start Date   azithromycin 250 MG tablet  Commonly known as: Zithromax   1 tablet a day.   Start Date: October 25, 2023     furosemide 20 MG tablet  Commonly known as: LASIX   20 mg, Oral, Daily   Start Date: October 25, 2023     predniSONE 20 MG tablet  Commonly known as: DELTASONE   40 mg, Oral, Daily   Start Date: October 25, 2023            Changes to Medications        Instructions Start Date   carvedilol 12.5 MG tablet  Commonly known as: COREG  What changed:   how much to take  how to take this  when to take this   TAKE 1 AND 1/2 TABLET BY MOUTH TWO TIMES A DAY      clobetasol 0.05 % cream  Commonly known as: TEMOVATE  What changed:   how much to take  how to take this  when to take this  reasons to take this  additional instructions   APPLY TO AFFECTED AREA(S) TWO TIMES A DAY      estradiol 0.1 MG/GM vaginal cream  Commonly known as: ESTRACE  What changed: See the new instructions.   INSERT 2 GRAMS INTO THE VAGINA DAILY      ezetimibe 10 MG tablet  Commonly known as: ZETIA  What changed: when to take this   TAKE ONE TABLET BY MOUTH DAILY      ferrous sulfate 325 (65 FE) MG tablet  What changed: when to take this   325 mg, Oral, Daily With Breakfast      glimepiride 2 MG tablet  Commonly known as: AMARYL  What changed:   how much to take  how to take this  when to take this  additional instructions   TAKE ONE TABLET BY MOUTH TWICE A DAY      Januvia 50 MG tablet  Generic drug: SITagliptin  What changed: how much to take   TAKE ONE TABLET BY MOUTH DAILY      metFORMIN 850 MG tablet  Commonly known as: GLUCOPHAGE  What changed: when to take this   TAKE ONE TABLET BY MOUTH TWICE A DAY      PARoxetine 40 MG tablet  Commonly known as: PAXIL  What changed: when to take this   40 mg, Oral, Daily      pravastatin 80 MG tablet  Commonly known as: PRAVACHOL  What changed: when to take this    TAKE ONE TABLET BY MOUTH DAILY             Continue These Medications        Instructions Start Date   albuterol sulfate  (90 Base) MCG/ACT inhaler  Commonly known as: PROVENTIL HFA;VENTOLIN HFA;PROAIR HFA   2 puffs, Inhalation, Every 4 Hours PRN      amLODIPine 5 MG tablet  Commonly known as: NORVASC   TAKE ONE TABLET BY MOUTH DAILY      aspirin 81 MG EC tablet   81 mg, Oral, Nightly      cetirizine 10 MG tablet  Commonly known as: zyrTEC   10 mg, Oral, Daily      Cranberry 500 MG chewable tablet   1,000 mg, Oral, Nightly, Gummies      Fluticasone Furoate-Vilanterol 200-25 MCG/ACT inhaler  Commonly known as: Breo Ellipta   1 puff, Inhalation, Daily - RT      losartan 50 MG tablet  Commonly known as: COZAAR   TAKE ONE TABLET BY MOUTH TWICE A DAY      montelukast 10 MG tablet  Commonly known as: SINGULAIR   10 mg, Oral, Every Morning      multivitamin with minerals tablet tablet   1 tablet, Oral, Nightly, Gummies      nitroglycerin 0.4 MG SL tablet  Commonly known as: NITROSTAT   1 under the tongue as needed for angina, may repeat q5mins for up three doses      pantoprazole 40 MG EC tablet  Commonly known as: PROTONIX   TAKE ONE TABLET BY MOUTH DAILY      Vitamin C 500 MG chewable tablet   500 mg, Oral, Nightly, Gummies      Vitamin D3 25 MCG (1000 UT) chewable tablet   1,000 Units, Oral, Nightly, Gummies      Zinc 25 MG tablet   25 mg, Oral, Nightly, Gummies               Allergies   Allergen Reactions    Amoxicillin-Pot Clavulanate Hives and Unknown - Low Severity    Cipro [Ciprofloxacin Hcl] Hives    Ciprofloxacin Unknown - Low Severity    Clindamycin Unknown - Low Severity    Clindamycin/Lincomycin Rash    Macrobid [Nitrofurantoin] Hives    Penicillins Hives    Sulfamethoxazole-Trimethoprim Nausea Only and Unknown - Low Severity       Discharge Disposition:  Home-Health Care Svc.  In private vehicle with family member    Diet:  Diet Instructions       Diet: Cardiac Diets, Diabetic Diets; Healthy Heart (2-3  Na+); Thin (IDDSI 0); Consistent Carbohydrate      Discharge Diet:  Cardiac Diets  Diabetic Diets       Cardiac Diet: Healthy Heart (2-3 Na+)    Fluid Consistency: Thin (IDDSI 0)    Diabetic Diet: Consistent Carbohydrate            Discharge Activity:   Activity Instructions       Activity as Tolerated              CODE STATUS:  Code Status and Medical Interventions:   Ordered at: 10/22/23 0931     Level Of Support Discussed With:    Patient     Code Status (Patient has no pulse and is not breathing):    CPR (Attempt to Resuscitate)     Medical Interventions (Patient has pulse or is breathing):    Full Support         Future Appointments   Date Time Provider Department Center   10/30/2023  2:00 PM Lidia Baird [place]N [facility]   1/5/2024  9:45 AM Tess South [facility]ETRO [facility]   2/20/2024  9:15 AM Harleen Dasilva [facility]ETRO [facility]   3/6/2024 11:45 AM Jacobo Redmond MD [facility]       Additional Instructions for the Follow-ups that You Need to Schedule       Discharge Follow-up with PCP   As directed       Currently Documented PCP:    Jacobo Redmond MD    PCP Phone Number:    [phone]     Follow Up Details: 1 week        Discharge Follow-up with Specialty: Lung doctor; 2 Weeks   As directed      Specialty: Lung doctor   Follow Up: 2 Weeks        Discharge Follow-up with Specified Provider: Dr. Romero   As directed      To: Dr. Romero                Pertinent  and/or Most Recent Results     PROCEDURES:   * Cannot find OR case *     LAB RESULTS:      Lab 10/21/23  1003   WBC 9.78   HEMOGLOBIN 10.6*   HEMATOCRIT 35.5   PLATELETS 226   NEUTROS ABS 7.95*   IMMATURE GRANS (ABS) 0.03   LYMPHS ABS 0.90   MONOS ABS 0.58   EOS ABS 0.29   MCV 95.9   PROCALCITONIN 0.07         Lab 10/23/23  0523 10/22/23  0433 10/21/23  1003   SODIUM 139 138 140   POTASSIUM 4.7 5.3* 4.9   CHLORIDE 104 105 106   CO2 25.6 25.4 23.7   ANION GAP 9.4 7.6 10.3   BUN 38* 37* 31*   CREATININE 1.19*  1.21* 1.08*   EGFR 44.9* 44.0* 50.4*   GLUCOSE 179* 210* 170*   CALCIUM 9.4 9.5 9.9   MAGNESIUM 2.1 1.9 1.7         Lab 10/21/23  1003   TOTAL PROTEIN 7.5   ALBUMIN 4.1   GLOBULIN 3.4   ALT (SGPT) 11   AST (SGOT) 8   BILIRUBIN 0.6   ALK PHOS 66         Lab 10/21/23  2017 10/21/23  1804 10/21/23  1003   PROBNP  --   --  394.7   HSTROP T 16* 15* 15*                 Brief Urine Lab Results       None          Microbiology Results (last 10 days)       Procedure Component Value - Date/Time    Respiratory Panel PCR w/COVID-19(SARS-CoV-2) MARK/SABA/BRAN/PAD/COR/MAD/INÉS In-House, NP Swab in UTM/VTM, 3-4 HR TAT - Swab, Nasopharynx [[id number]]  (Normal) Collected: 10/21/23 1839    Lab Status: Final result Specimen: Swab from Nasopharynx Updated: 10/21/23 1940     ADENOVIRUS, PCR Not Detected     Coronavirus 229E Not Detected     Coronavirus HKU1 Not Detected     Coronavirus NL63 Not Detected     Coronavirus OC43 Not Detected     COVID19 Not Detected     Human Metapneumovirus Not Detected     Human Rhinovirus/Enterovirus Not Detected     Influenza A PCR Not Detected     Influenza B PCR Not Detected     Parainfluenza Virus 1 Not Detected     Parainfluenza Virus 2 Not Detected     Parainfluenza Virus 3 Not Detected     Parainfluenza Virus 4 Not Detected     RSV, PCR Not Detected     Bordetella pertussis pcr Not Detected     Bordetella parapertussis PCR Not Detected     Chlamydophila pneumoniae PCR Not Detected     Mycoplasma pneumo by PCR Not Detected    Narrative:      In the setting of a positive respiratory panel with a viral infection PLUS a negative procalcitonin without other underlying concern for bacterial infection, consider observing off antibiotics or discontinuation of antibiotics and continue supportive care. If the respiratory panel is positive for atypical bacterial infection (Bordetella pertussis, Chlamydophila pneumoniae, or Mycoplasma pneumoniae), consider antibiotic de-escalation to target atypical bacterial  infection.    COVID-19, FLU A/B, RSV PCR - Swab, Nasopharynx [[id number]]  (Normal) Collected: 10/21/23 1047    Lab Status: Final result Specimen: Swab from Nasopharynx Updated: 10/21/23 1137     COVID19 Not Detected     Influenza A PCR Not Detected     Influenza B PCR Not Detected     RSV, PCR Not Detected    Narrative:      Fact sheet for providers: https://www.fda.gov/media/273164/download    Fact sheet for patients: https://www.fda.gov/media/617361/download    Test performed by PCR.            XR Chest PA & Lateral    Result Date: 10/22/2023  Impression:  Mild cardiomegaly with interstitial edema and small bilateral pleural effusions likely unchanged from 1 day ago given differences in technical factors and positioning.      RAFITA LAW DO       Electronically Signed and Approved By: RAFITA LAW DO on 10/22/2023 at 9:46             XR Chest 1 View    Result Date: 10/21/2023  Impression:   1. Nonspecific perihilar interstitial changes that could be secondary to inflammatory infectious process or edema. 2. Cardiomegaly 3. Degenerative change both shoulders.       YARED REGALADO MD       Electronically Signed and Approved By: YARED REGALADO MD on 10/21/2023 at 10:21                      Results for orders placed in visit on 03/17/22    Adult Transthoracic Echo Complete W/ Cont if Necessary Per Protocol    Interpretation Summary  · Left ventricular wall thickness is consistent with mild concentric hypertrophy.  · Estimated left ventricular EF was in agreement with the calculated left ventricular EF. Left ventricular ejection fraction appears to be 61 - 65%. Left ventricular systolic function is normal.  · Left ventricular diastolic function is consistent with (grade I) impaired relaxation.  · Moderate mitral valve regurgitation is present.  · Estimated right ventricular systolic pressure from tricuspid regurgitation is mildly elevated (35-45 mmHg).  · Mild aortic vakve regurgitation is present.      Imaging Results  (All)       Procedure Component Value Units Date/Time    XR Chest PA & Lateral [[id number]] Collected: 10/22/23 0946     Updated: 10/22/23 0949    Narrative:      PROCEDURE: XR CHEST PA AND LATERAL     COMPARISON: [facility], CR, XR CHEST 2 VW, 11/17/2021, 9:34.  [facility], CR, XR CHEST 1 VW, 10/21/2023, 10:15.     INDICATIONS: WORSENING SHORTNESS OF BREATH.     FINDINGS:   There is stable mild cardiomegaly.  Calcifications in the thoracic aorta are also present,   unchanged.  Pulmonary vascularity is mildly prominent and there is mild interstitial prominence   additionally there are small bilateral pleural effusions best seen on the lateral view.    Interstitial edema is suspected.  No focal pulmonary opacities.  No pneumothorax.  The trachea is   midline.  There are senescent changes in the visualized skeletal structures.       Impression:       Mild cardiomegaly with interstitial edema and small bilateral pleural effusions likely   unchanged from 1 day ago given differences in technical factors and positioning.              RAFITA LAW DO         Electronically Signed and Approved By: RAFITA LAW DO on 10/22/2023 at 9:46                     XR Chest 1 View [[id number]] Collected: 10/21/23 1021     Updated: 10/21/23 1025    Narrative:      PROCEDURE: XR CHEST 1 VW     COMPARISON: [facility], CR, XR CHEST 2 VW, 11/17/2021, 9:34.     INDICATIONS: WORSENING SHORTNESS OF BREATH X 2-3 DAYS     FINDINGS:   The heart is enlarged.  There are interstitial changes involving the lungs which are nonspecific.    This could be due to inflammatory infectious process or possibly some edema.  There are no pleural   effusions.  There are degenerative changes involving both shoulders.       Impression:         1. Nonspecific perihilar interstitial changes that could be secondary to inflammatory infectious   process or edema.  2. Cardiomegaly  3. Degenerative change  both shoulders.                  YARED REGALADO MD         Electronically Signed and Approved By: YARED REGALADO MD on 10/21/2023 at 10:21                              Labs Pending at Discharge:  Pending Labs       Order Current Status    Urinalysis With Culture If Indicated - Urine, Clean Catch In process    Urinalysis, Microscopic Only - Urine, Clean Catch In process                Time spent on Discharge including face to face service: 35 minutes  Part of this note may be an electronic transcription/translation of spoken language to printed text using the Dragon Dictation System.     TElectronically signed by Kelvin Thomas MD, 10/24/23, 3:49 PM EDT.

## 2023-10-24 NOTE — OUTREACH NOTE
Prep Survey      Flowsheet Row Responses   [facility] patient discharged from? [facility]   Is LACE score < 7 ? No   Eligibility [facility]   Date of Admission 10/21/23   Date of Discharge 10/24/23   Discharge Disposition Home or Self Care   Discharge diagnosis Acute exacerbation of CHF (congestive heart failure)   Does the patient have one of the following disease processes/diagnoses(primary or secondary)? CHF   Is there a DME ordered? Yes   What DME was ordered? [facility].   Prep survey completed? Yes            Deepali LOPEZ - Registered Nurse

## 2023-10-25 ENCOUNTER — TRANSITIONAL CARE MANAGEMENT TELEPHONE ENCOUNTER (OUTPATIENT)
Dept: CALL CENTER | Facility: HOSPITAL | Age: 88
End: 2023-10-25
Payer: MEDICARE

## 2023-10-25 LAB — BACTERIA SPEC AEROBE CULT: NORMAL

## 2023-10-25 NOTE — OUTREACH NOTE
Call Center TCM Note      Flowsheet Row Responses   [facility] facility patient discharged from? [facility]   Does the patient have one of the following disease processes/diagnoses(primary or secondary)? CHF   TCM attempt successful? No   Unsuccessful attempts Attempt 2             Yamile Moffett RN    10/25/2023, 14:57 EDT

## 2023-10-25 NOTE — TELEPHONE ENCOUNTER
CALLED PATIENT WITH D/T OF APPT.    MYCHART NOTIFICATION SENT, LVM ON PHONE AND WILL MAIL REMINDER AS WELL.   CLOSING ENCOUNTER AT THIS TIME

## 2023-10-25 NOTE — OUTREACH NOTE
Call Center TCM Note      Flowsheet Row Responses   [facility] patient discharged from? [facility]   Does the patient have one of the following disease processes/diagnoses(primary or secondary)? CHF   TCM attempt successful? No  [no verbal release on file]   Unsuccessful attempts Attempt 1   Call Status Left message             Yamile Moffett RN    10/25/2023, 10:36 EDT

## 2023-10-26 ENCOUNTER — TRANSITIONAL CARE MANAGEMENT TELEPHONE ENCOUNTER (OUTPATIENT)
Dept: CALL CENTER | Facility: HOSPITAL | Age: 88
End: 2023-10-26
Payer: MEDICARE

## 2023-10-26 RX ORDER — BLOOD SUGAR DIAGNOSTIC
1 STRIP MISCELLANEOUS 2 TIMES DAILY
OUTPATIENT
Start: 2023-10-26

## 2023-10-26 NOTE — OUTREACH NOTE
Call Center TCM Note      Flowsheet Row Responses   [facility] facility patient discharged from? [facility]   Does the patient have one of the following disease processes/diagnoses(primary or secondary)? CHF   TCM attempt successful? No  [no verbal release on file]   Unsuccessful attempts Attempt 3             Yamile Moffett RN    10/26/2023, 10:12 EDT

## 2023-10-30 ENCOUNTER — OFFICE VISIT (OUTPATIENT)
Dept: INTERNAL MEDICINE | Facility: CLINIC | Age: 88
End: 2023-10-30
Payer: MEDICARE

## 2023-10-30 VITALS
RESPIRATION RATE: 20 BRPM | DIASTOLIC BLOOD PRESSURE: 64 MMHG | BODY MASS INDEX: 40.17 KG/M2 | OXYGEN SATURATION: 94 % | TEMPERATURE: 98.6 F | WEIGHT: 204.6 LBS | HEART RATE: 62 BPM | HEIGHT: 60 IN | SYSTOLIC BLOOD PRESSURE: 116 MMHG

## 2023-10-30 DIAGNOSIS — J45.41 MODERATE PERSISTENT ASTHMA WITH ACUTE EXACERBATION: ICD-10-CM

## 2023-10-30 DIAGNOSIS — Z09 HOSPITAL DISCHARGE FOLLOW-UP: Primary | ICD-10-CM

## 2023-10-30 DIAGNOSIS — E11.9 TYPE 2 DIABETES MELLITUS WITHOUT COMPLICATION, WITHOUT LONG-TERM CURRENT USE OF INSULIN: ICD-10-CM

## 2023-10-30 DIAGNOSIS — J42 CHRONIC BRONCHITIS, UNSPECIFIED CHRONIC BRONCHITIS TYPE: ICD-10-CM

## 2023-10-30 NOTE — ASSESSMENT & PLAN NOTE
Patient was admitted to [facility] on October 21 and was discharged October 24.  She was admitted with acute exacerbation of congestive heart failure, bronchitis.  Patient does have a past medical history of coronary artery disease, asthma, chronic bronchitis, chronic hypoxic respiratory failure and went to the emergency department due to increasing shortness of breath and productive cough.  Her oxygen saturation was found to be 72% on room air.  Her O2 saturation did improve with supplemental oxygen.  Patient has declined oxygen therapy in the past.  She was admitted to the hospital and treated with IV diuretics, abx, and steroids and breathing treatments.  She did well throughout her hospitalization.  She was discharged home on supplemental oxygen due to failing home walk test. She has done well for the most part since being discharged; however, she is not feeling as well as she was when she was on the steroids.  She has finished steroids and abx.  She is now utilizing o2  She is requesting getting the portable O2.   She denies any worsening shortness of breath. She denies any chest pain, palpitations.  Patient daughter states that patient admitted she was not taking the lasix regularly.  We discussed the importance of medication compliance, utilizing oxygen as prescribed.  She does have follow up with cardiology scheduled  Will have her follow up with her pulmonologist as well.   Continue breathing treatments as ordered.

## 2023-10-30 NOTE — PROGRESS NOTES
Chief Complaint  Hospital Follow Up Visit (87 year old female here today for a hospital follow up due to Chronic CHF exacerbation and acute bronchitis)    History of Present Illness  SUBJECTIVE  Devi Diallo presents to [facility]INE Transitional Care Follow Up Visit  Gi Diallo is a 87 y.o. female who presents for a transitional care management visit.    Within 48 business hours after discharge our office contacted her via telephone to coordinate her care and needs.      I reviewed and discussed the details of that call along with the discharge summary, hospital problems, inpatient lab results, inpatient diagnostic studies, and consultation reports with Devi.     Current outpatient and discharge medications have been reconciled for the patient.  Reviewed by: WALTER Chavez          10/24/2023     7:05 PM   Date of TCM Phone Call   Hospital [facility]   Date of Admission 10/21/2023   Date of Discharge 10/24/2023   Discharge Disposition Home or Self Care     Risk for Readmission (LACE) Score: 11 (10/24/2023  6:00 AM)    Patient was admitted to [facility] on October 21 and was discharged October 24.  She was admitted with acute exacerbation of congestive heart failure.  Patient does have a past medical history of coronary artery disease, asthma, chronic bronchitis, chronic hypoxic respiratory failure and went to the emergency department due to increasing shortness of breath and productive cough.  Her oxygen saturation was found to be 72% on room air.  Her O2 saturation did improve with supplemental oxygen.  Patient has declined oxygen therapy in the past.  She was admitted to the hospital and treated with IV diuretics, abx, and steroids and breathing treatments.  She did well throughout her hospitalization.  She was discharged home on supplemental oxygen due to failing home walk test. She has done well for the most part since being  discharged; however, she is not feeling as well as she was when she was on the steroids.  She is now utilizing o2  She is requesting getting the portable O2.   Patient daughter states that patient admitted she was not taking the lasix regularly.    She states her sugars are improving since finishing the prednisone.     Past Medical History:   Diagnosis Date    Arthritis     Atherosclerotic heart disease of native coronary artery without angina pectoris 1/1/2002    cardiac catheterization 2002    Diabetes     Essential hypertension 9/20/2012    Hyperlipemia     Hypertension     Pure hypercholesterolemia 4/25/2014    Reflux esophagitis     Seasonal allergies     Stage 2 chronic kidney disease 9/20/2012      Family History   Problem Relation Age of Onset    Cancer Mother         Unspecified    Arthritis Mother     Diabetes Brother         Unspecified type    Arthritis Brother       Past Surgical History:   Procedure Laterality Date    COLONOSCOPY      EYE SURGERY      Eye Implant    HYSTERECTOMY          Current Outpatient Medications:     albuterol (ACCUNEB) 1.25 MG/3ML nebulizer solution, Take 3 mL by nebulization Every 6 (Six) Hours As Needed for Wheezing for up to 30 days., Disp: 360 mL, Rfl: 12    albuterol sulfate  (90 Base) MCG/ACT inhaler, Inhale 2 puffs Every 4 (Four) Hours As Needed for Wheezing for up to 30 days., Disp: 18 g, Rfl: 11    amLODIPine (NORVASC) 5 MG tablet, TAKE ONE TABLET BY MOUTH DAILY (Patient taking differently: Take 1 tablet by mouth Daily.), Disp: 90 tablet, Rfl: 3    Ascorbic Acid (Vitamin C) 500 MG chewable tablet, Chew 500 mg Every Night. Gummies, Disp: , Rfl:     aspirin 81 MG EC tablet, Take 1 tablet by mouth Every Night., Disp: , Rfl:     carvedilol (COREG) 12.5 MG tablet, TAKE 1 AND 1/2 TABLET BY MOUTH TWO TIMES A DAY (Patient taking differently: Take 1.5 tablets by mouth 2 (Two) Times a Day. TAKE 1 AND 1/2 TABLET BY MOUTH TWO TIMES A DAY), Disp: 270 tablet, Rfl: 1     cetirizine (zyrTEC) 10 MG tablet, Take 1 tablet by mouth Daily., Disp: , Rfl:     Cholecalciferol (Vitamin D3) 25 MCG (1000 UT) chewable tablet, Chew 1,000 Units Every Night. Gummies, Disp: , Rfl:     clobetasol (TEMOVATE) 0.05 % cream, APPLY TO AFFECTED AREA(S) TWO TIMES A DAY (Patient taking differently: Apply 1 application  topically to the appropriate area as directed As Needed.), Disp: 45 g, Rfl: 3    Cranberry 500 MG chewable tablet, Chew 1,000 mg Every Night. Gummies, Disp: , Rfl:     estradiol (ESTRACE) 0.1 MG/GM vaginal cream, INSERT 2 GRAMS INTO THE VAGINA DAILY (Patient taking differently: Insert 2 g into the vagina 2 (Two) Times a Day As Needed.), Disp: 42.5 g, Rfl: 12    ezetimibe (ZETIA) 10 MG tablet, TAKE ONE TABLET BY MOUTH DAILY (Patient taking differently: Take 1 tablet by mouth Every Night.), Disp: 90 tablet, Rfl: 3    ferrous sulfate 325 (65 FE) MG tablet, Take 1 tablet by mouth Daily With Breakfast. (Patient taking differently: Take 1 tablet by mouth Every Night.), Disp: 90 tablet, Rfl: 3    Fluticasone Furoate-Vilanterol (Breo Ellipta) 200-25 MCG/ACT inhaler, Inhale 1 puff Daily., Disp: 60 each, Rfl: 11    furosemide (LASIX) 20 MG tablet, Take 1 tablet by mouth Daily for 30 days., Disp: 30 tablet, Rfl: 0    glimepiride (AMARYL) 2 MG tablet, TAKE ONE TABLET BY MOUTH TWICE A DAY (Patient taking differently: 1 tab PO Q AM if needed), Disp: 180 tablet, Rfl: 1    Januvia 50 MG tablet, TAKE ONE TABLET BY MOUTH DAILY (Patient taking differently: Take 1 tablet by mouth Daily.), Disp: 90 tablet, Rfl: 3    losartan (COZAAR) 50 MG tablet, TAKE ONE TABLET BY MOUTH TWICE A DAY (Patient taking differently: Take 1 tablet by mouth 2 (Two) Times a Day.), Disp: 180 tablet, Rfl: 2    metFORMIN (GLUCOPHAGE) 850 MG tablet, TAKE ONE TABLET BY MOUTH TWICE A DAY (Patient taking differently: Take 1 tablet by mouth 2 (Two) Times a Day With Meals.), Disp: 180 tablet, Rfl: 3    montelukast (SINGULAIR) 10 MG tablet, Take 1  "tablet by mouth Every Morning., Disp: , Rfl:     multivitamin with minerals tablet tablet, Take 1 tablet by mouth Every Night. Gummies, Disp: , Rfl:     nitroglycerin (NITROSTAT) 0.4 MG SL tablet, 1 under the tongue as needed for angina, may repeat q5mins for up three doses, Disp: 100 tablet, Rfl: 11    pantoprazole (PROTONIX) 40 MG EC tablet, TAKE ONE TABLET BY MOUTH DAILY (Patient taking differently: Take 1 tablet by mouth Daily.), Disp: 90 tablet, Rfl: 3    PARoxetine (PAXIL) 40 MG tablet, Take 1 tablet by mouth Daily. (Patient taking differently: Take 1 tablet by mouth Every Night.), Disp: 90 tablet, Rfl: 3    pravastatin (PRAVACHOL) 80 MG tablet, TAKE ONE TABLET BY MOUTH DAILY (Patient taking differently: Take 1 tablet by mouth Every Night.), Disp: 90 tablet, Rfl: 3    Zinc 25 MG tablet, Take 25 mg by mouth Every Night. Gummies, Disp: , Rfl:     glucose blood test strip, Check glucose bid. Diagnosis: E11.9, Disp: 100 each, Rfl: 5    OBJECTIVE  Vital Signs:   /64 (BP Location: Left arm, Patient Position: Sitting)   Pulse 62   Temp 98.6 °F (37 °C) (Temporal)   Resp 20   Ht 152.4 cm (60\")   Wt 92.8 kg (204 lb 9.6 oz)   SpO2 94%   BMI 39.96 kg/m²    Estimated body mass index is 39.96 kg/m² as calculated from the following:    Height as of this encounter: 152.4 cm (60\").    Weight as of this encounter: 92.8 kg (204 lb 9.6 oz).     Wt Readings from Last 3 Encounters:   10/30/23 92.8 kg (204 lb 9.6 oz)   10/24/23 97.6 kg (215 lb 2.7 oz)   09/05/23 97.1 kg (214 lb)     BP Readings from Last 3 Encounters:   10/30/23 116/64   10/24/23 154/59   09/05/23 152/65       Physical Exam  Vitals and nursing note reviewed.   Constitutional:       Appearance: Normal appearance.   HENT:      Head: Normocephalic.   Eyes:      Extraocular Movements: Extraocular movements intact.      Conjunctiva/sclera: Conjunctivae normal.   Cardiovascular:      Rate and Rhythm: Normal rate and regular rhythm.      Heart sounds: Normal " heart sounds. No murmur heard.  Pulmonary:      Effort: Pulmonary effort is normal.      Breath sounds: Normal breath sounds. No wheezing or rales.      Comments: Diminished in the bases  Musculoskeletal:         General: No swelling. Normal range of motion.      Cervical back: Normal range of motion and neck supple.   Skin:     General: Skin is warm and dry.   Neurological:      General: No focal deficit present.      Mental Status: She is alert and oriented to person, place, and time. Mental status is at baseline.   Psychiatric:         Mood and Affect: Mood normal.         Behavior: Behavior normal.         Thought Content: Thought content normal.         Judgment: Judgment normal.          Result Review    CMP          10/21/2023    10:03 10/22/2023    04:33 10/23/2023    05:23   CMP   Glucose 170  210  179    BUN 31  37  38    Creatinine 1.08  1.21  1.19    EGFR 50.4  44.0  44.9    Sodium 140  138  139    Potassium 4.9  5.3  4.7    Chloride 106  105  104    Calcium 9.9  9.5  9.4    Total Protein 7.5      Albumin 4.1      Globulin 3.4      Total Bilirubin 0.6      Alkaline Phosphatase 66      AST (SGOT) 8      ALT (SGPT) 11      Albumin/Globulin Ratio 1.2      BUN/Creatinine Ratio 28.7  30.6  31.9    Anion Gap 10.3  7.6  9.4      CBC w/diff          8/18/2023    15:34 8/31/2023    09:23 10/21/2023    10:03   CBC w/Diff   WBC 13.07  12.46  9.78    RBC 3.83  3.80  3.70    Hemoglobin 11.5  11.2  10.6    Hematocrit 35.3  34.7  35.5    MCV 92.2  91.3  95.9    MCH 30.0  29.5  28.6    MCHC 32.6  32.3  29.9    RDW 13.3  13.2  16.3    Platelets 290  285  226    Neutrophil Rel % 74.0  78.1  81.3    Immature Granulocyte Rel % 0.3  0.5  0.3    Lymphocyte Rel % 15.1  12.3  9.2    Monocyte Rel % 8.4  6.9  5.9    Eosinophil Rel % 1.9  2.0  3.0    Basophil Rel % 0.3  0.2  0.3      BMP          10/21/2023    10:03 10/22/2023    04:33 10/23/2023    05:23   BMP   BUN 31  37  38    Creatinine 1.08  1.21  1.19    Sodium 140  138   139    Potassium 4.9  5.3  4.7    Chloride 106  105  104    CO2 23.7  25.4  25.6    Calcium 9.9  9.5  9.4        XR Chest PA & Lateral    Result Date: 10/22/2023   Mild cardiomegaly with interstitial edema and small bilateral pleural effusions likely unchanged from 1 day ago given differences in technical factors and positioning.      RAFITA LAW DO       Electronically Signed and Approved By: RAFITA LAW DO on 10/22/2023 at 9:46             XR Chest 1 View    Result Date: 10/21/2023    1. Nonspecific perihilar interstitial changes that could be secondary to inflammatory infectious process or edema. 2. Cardiomegaly 3. Degenerative change both shoulders.       YARED REGALADO MD       Electronically Signed and Approved By: YARED REGALADO MD on 10/21/2023 at 10:21                The above data has been reviewed by WALTER Chavez 10/30/2023 12:46 EDT.          Patient Care Team:  Jacobo Redmond MD as PCP - General (Internal Medicine)  Teena Knapp MD as Consulting Physician (Obstetrics and Gynecology)            ASSESSMENT & PLAN    Diagnoses and all orders for this visit:    1. Hospital discharge follow-up (Primary)  Assessment & Plan:  Patient was admitted to [facility] on October 21 and was discharged October 24.  She was admitted with acute exacerbation of congestive heart failure, bronchitis.  Patient does have a past medical history of coronary artery disease, asthma, chronic bronchitis, chronic hypoxic respiratory failure and went to the emergency department due to increasing shortness of breath and productive cough.  Her oxygen saturation was found to be 72% on room air.  Her O2 saturation did improve with supplemental oxygen.  Patient has declined oxygen therapy in the past.  She was admitted to the hospital and treated with IV diuretics, abx, and steroids and breathing treatments.  She did well throughout her hospitalization.  She was discharged home on supplemental oxygen due to  failing home walk test. She has done well for the most part since being discharged; however, she is not feeling as well as she was when she was on the steroids.  She has finished steroids and abx.  She is now utilizing o2  She is requesting getting the portable O2.   She denies any worsening shortness of breath. She denies any chest pain, palpitations.  Patient daughter states that patient admitted she was not taking the lasix regularly.  We discussed the importance of medication compliance, utilizing oxygen as prescribed.  She does have follow up with cardiology scheduled  Will have her follow up with her pulmonologist as well.   Continue breathing treatments as ordered.      2. Type 2 diabetes mellitus without complication, without long-term current use of insulin  -     glucose blood test strip; Check glucose bid. Diagnosis: E11.9  Dispense: 100 each; Refill: 5  -     Basic metabolic panel; Future    3. Chronic bronchitis, unspecified chronic bronchitis type    4. Moderate persistent asthma with acute exacerbation         Tobacco Use: Low Risk  (10/30/2023)    Patient History     Smoking Tobacco Use: Never     Smokeless Tobacco Use: Never     Passive Exposure: Never       Follow Up     Return in about 6 weeks (around 12/11/2023) for Recheck.    Please note that portions of this note were completed with a voice recognition program.    Patient was given instructions and counseling regarding her condition or for health maintenance advice. Please see specific information pulled into the AVS if appropriate.   I have reviewed information obtained and documented by others and I have confirmed the accuracy of this documented note.    WALTER Chavez

## 2023-11-06 RX ORDER — CARVEDILOL 12.5 MG/1
TABLET ORAL
Qty: 270 TABLET | Refills: 0 | Status: SHIPPED | OUTPATIENT
Start: 2023-11-06

## 2023-11-07 ENCOUNTER — OFFICE VISIT (OUTPATIENT)
Dept: CARDIOLOGY | Facility: CLINIC | Age: 88
End: 2023-11-07
Payer: MEDICARE

## 2023-11-07 VITALS
DIASTOLIC BLOOD PRESSURE: 42 MMHG | HEART RATE: 76 BPM | BODY MASS INDEX: 42.53 KG/M2 | WEIGHT: 216.6 LBS | HEIGHT: 60 IN | SYSTOLIC BLOOD PRESSURE: 196 MMHG

## 2023-11-07 DIAGNOSIS — N18.2 STAGE 2 CHRONIC KIDNEY DISEASE: ICD-10-CM

## 2023-11-07 DIAGNOSIS — I10 ESSENTIAL HYPERTENSION: Primary | ICD-10-CM

## 2023-11-07 DIAGNOSIS — E78.00 HYPERCHOLESTEROLEMIA: ICD-10-CM

## 2023-11-07 DIAGNOSIS — R60.0 EDEMA LEG: ICD-10-CM

## 2023-11-07 DIAGNOSIS — I25.10 ATHEROSCLEROSIS OF NATIVE CORONARY ARTERY OF NATIVE HEART WITHOUT ANGINA PECTORIS: ICD-10-CM

## 2023-11-07 PROCEDURE — 99214 OFFICE O/P EST MOD 30 MIN: CPT

## 2023-11-07 PROCEDURE — 3078F DIAST BP <80 MM HG: CPT

## 2023-11-07 PROCEDURE — 1160F RVW MEDS BY RX/DR IN RCRD: CPT

## 2023-11-07 PROCEDURE — 3077F SYST BP >= 140 MM HG: CPT

## 2023-11-07 PROCEDURE — 1159F MED LIST DOCD IN RCRD: CPT

## 2023-11-07 RX ORDER — FUROSEMIDE 40 MG/1
40 TABLET ORAL DAILY
Qty: 30 TABLET | Refills: 3 | Status: SHIPPED | OUTPATIENT
Start: 2023-11-07 | End: 2024-03-06

## 2023-11-07 NOTE — PROGRESS NOTES
Chief Complaint  Hypertension and Hospital Follow Up Visit ([facility] f/u)    Subjective        History of Present Illness  Devi Diallo presents to [facility] CARDIOLOGY for follow up.  Patient is an 87-year-old female with past medical history outlined below, significant for coronary artery disease, diabetes, hypertension, hyperlipidemia who presents for follow-up.  She was hospitalized recently with acute bronchitis and was told to follow-up with cardiology.  She has no cardiac complaints or concerns.  She has some swelling of bilateral lower extremities.  She denies any palpitations, dizziness, lightheadedness, chest pain or dyspnea.      Past Medical History:   Diagnosis Date    Arthritis     Atherosclerotic heart disease of native coronary artery without angina pectoris 1/1/2002    cardiac catheterization 2002    Diabetes     Essential hypertension 9/20/2012    Hyperlipemia     Hypertension     Pure hypercholesterolemia 4/25/2014    Reflux esophagitis     Seasonal allergies     Stage 2 chronic kidney disease 9/20/2012       ALLERGY  Allergies   Allergen Reactions    Amoxicillin-Pot Clavulanate Hives and Unknown - Low Severity    Cipro [Ciprofloxacin Hcl] Hives    Ciprofloxacin Unknown - Low Severity    Clindamycin Unknown - Low Severity    Clindamycin/Lincomycin Rash    Macrobid [Nitrofurantoin] Hives    Penicillins Hives    Sulfamethoxazole-Trimethoprim Nausea Only and Unknown - Low Severity        Past Surgical History:   Procedure Laterality Date    COLONOSCOPY      EYE SURGERY      Eye Implant    HYSTERECTOMY          Social History     Socioeconomic History    Marital status:    Tobacco Use    Smoking status: Never     Passive exposure: Never    Smokeless tobacco: Never   Vaping Use    Vaping Use: Never used   Substance and Sexual Activity    Alcohol use: Never    Drug use: Never    Sexual activity: Defer       Family History   Problem Relation Age of Onset    Cancer Mother          Unspecified    Arthritis Mother     Diabetes Brother         Unspecified type    Arthritis Brother         Current Outpatient Medications on File Prior to Visit   Medication Sig    albuterol (ACCUNEB) 1.25 MG/3ML nebulizer solution Take 3 mL by nebulization Every 6 (Six) Hours As Needed for Wheezing for up to 30 days.    albuterol sulfate  (90 Base) MCG/ACT inhaler Inhale 2 puffs Every 4 (Four) Hours As Needed for Wheezing for up to 30 days.    amLODIPine (NORVASC) 5 MG tablet TAKE ONE TABLET BY MOUTH DAILY (Patient taking differently: Take 1 tablet by mouth Daily.)    Ascorbic Acid (Vitamin C) 500 MG chewable tablet Chew 500 mg Every Night. Gummies    aspirin 81 MG EC tablet Take 1 tablet by mouth Every Night.    carvedilol (COREG) 12.5 MG tablet TAKE ONE AND ONE-HALF TABLETS BY MOUTH TWO TIMES A DAY    cetirizine (zyrTEC) 10 MG tablet Take 1 tablet by mouth Daily.    Cholecalciferol (Vitamin D3) 25 MCG (1000 UT) chewable tablet Chew 1,000 Units Every Night. Gummies    clobetasol (TEMOVATE) 0.05 % cream APPLY TO AFFECTED AREA(S) TWO TIMES A DAY (Patient taking differently: Apply 1 application  topically to the appropriate area as directed As Needed.)    Cranberry 500 MG chewable tablet Chew 1,000 mg Every Night. Gummies    estradiol (ESTRACE) 0.1 MG/GM vaginal cream INSERT 2 GRAMS INTO THE VAGINA DAILY (Patient taking differently: Insert 2 g into the vagina 2 (Two) Times a Day As Needed.)    ezetimibe (ZETIA) 10 MG tablet TAKE ONE TABLET BY MOUTH DAILY (Patient taking differently: Take 1 tablet by mouth Every Night.)    ferrous sulfate 325 (65 FE) MG tablet Take 1 tablet by mouth Daily With Breakfast. (Patient taking differently: Take 1 tablet by mouth Every Night.)    Fluticasone Furoate-Vilanterol (Breo Ellipta) 200-25 MCG/ACT inhaler Inhale 1 puff Daily.    glimepiride (AMARYL) 2 MG tablet TAKE ONE TABLET BY MOUTH TWICE A DAY (Patient taking differently: 1 tab PO Q AM if needed)    glucose blood test  "strip Check glucose bid. Diagnosis: E11.9    Januvia 50 MG tablet TAKE ONE TABLET BY MOUTH DAILY (Patient taking differently: Take 1 tablet by mouth Daily.)    losartan (COZAAR) 50 MG tablet TAKE ONE TABLET BY MOUTH TWICE A DAY (Patient taking differently: Take 1 tablet by mouth 2 (Two) Times a Day.)    metFORMIN (GLUCOPHAGE) 850 MG tablet TAKE ONE TABLET BY MOUTH TWICE A DAY (Patient taking differently: Take 1 tablet by mouth 2 (Two) Times a Day With Meals.)    montelukast (SINGULAIR) 10 MG tablet Take 1 tablet by mouth Every Morning.    multivitamin with minerals tablet tablet Take 1 tablet by mouth Every Night. Gummies    nitroglycerin (NITROSTAT) 0.4 MG SL tablet 1 under the tongue as needed for angina, may repeat q5mins for up three doses    pantoprazole (PROTONIX) 40 MG EC tablet TAKE ONE TABLET BY MOUTH DAILY (Patient taking differently: Take 1 tablet by mouth Daily.)    PARoxetine (PAXIL) 40 MG tablet Take 1 tablet by mouth Daily. (Patient taking differently: Take 1 tablet by mouth Every Night.)    pravastatin (PRAVACHOL) 80 MG tablet TAKE ONE TABLET BY MOUTH DAILY (Patient taking differently: Take 1 tablet by mouth Every Night.)    Zinc 25 MG tablet Take 25 mg by mouth Every Night. Gummies    [DISCONTINUED] furosemide (LASIX) 20 MG tablet Take 1 tablet by mouth Daily for 30 days.     No current facility-administered medications on file prior to visit.       Objective   Vitals:    11/07/23 1332   BP: (!) 196/42   Pulse: 76   Weight: 98.2 kg (216 lb 9.6 oz)   Height: 152.4 cm (60\")       Physical Exam  Constitutional:       General: She is awake. She is not in acute distress.     Appearance: Normal appearance.   HENT:      Head: Normocephalic.      Nose: Nose normal. No congestion.   Eyes:      Extraocular Movements: Extraocular movements intact.      Conjunctiva/sclera: Conjunctivae normal.      Pupils: Pupils are equal, round, and reactive to light.   Neck:      Thyroid: No thyromegaly.      Vascular: No " JVD.   Cardiovascular:      Rate and Rhythm: Normal rate and regular rhythm.      Chest Wall: PMI is not displaced.      Pulses: Normal pulses.      Heart sounds: Normal heart sounds, S1 normal and S2 normal. No murmur heard.     No friction rub. No gallop. No S3 or S4 sounds.   Pulmonary:      Effort: Pulmonary effort is normal.      Breath sounds: Normal breath sounds. No wheezing, rhonchi or rales.   Abdominal:      General: Bowel sounds are normal.      Palpations: Abdomen is soft.      Tenderness: There is no abdominal tenderness.   Musculoskeletal:      Cervical back: No tenderness.      Right lower leg: Edema present.      Left lower leg: Edema present.   Lymphadenopathy:      Cervical: No cervical adenopathy.   Skin:     General: Skin is warm and dry.      Capillary Refill: Capillary refill takes less than 2 seconds.      Coloration: Skin is not cyanotic.      Findings: No petechiae or rash.      Nails: There is no clubbing.   Neurological:      Mental Status: She is alert.   Psychiatric:         Mood and Affect: Mood normal.         Behavior: Behavior is cooperative.           Result Review     The following data was reviewed by WALTER Sadler on 11/07/23.    proBNP   Date Value Ref Range Status   10/21/2023 394.7 0.0 - 1,800.0 pg/mL Final     CMP          10/21/2023    10:03 10/22/2023    04:33 10/23/2023    05:23   CMP   Glucose 170  210  179    BUN 31  37  38    Creatinine 1.08  1.21  1.19    EGFR 50.4  44.0  44.9    Sodium 140  138  139    Potassium 4.9  5.3  4.7    Chloride 106  105  104    Calcium 9.9  9.5  9.4    Total Protein 7.5      Albumin 4.1      Globulin 3.4      Total Bilirubin 0.6      Alkaline Phosphatase 66      AST (SGOT) 8      ALT (SGPT) 11      Albumin/Globulin Ratio 1.2      BUN/Creatinine Ratio 28.7  30.6  31.9    Anion Gap 10.3  7.6  9.4      CBC w/diff          8/18/2023    15:34 8/31/2023    09:23 10/21/2023    10:03   CBC w/Diff   WBC 13.07  12.46  9.78    RBC 3.83   3.80  3.70    Hemoglobin 11.5  11.2  10.6    Hematocrit 35.3  34.7  35.5    MCV 92.2  91.3  95.9    MCH 30.0  29.5  28.6    MCHC 32.6  32.3  29.9    RDW 13.3  13.2  16.3    Platelets 290  285  226    Neutrophil Rel % 74.0  78.1  81.3    Immature Granulocyte Rel % 0.3  0.5  0.3    Lymphocyte Rel % 15.1  12.3  9.2    Monocyte Rel % 8.4  6.9  5.9    Eosinophil Rel % 1.9  2.0  3.0    Basophil Rel % 0.3  0.2  0.3       Lipid Panel          2/28/2023    08:52 8/31/2023    09:23   Lipid Panel   Total Cholesterol 132  119    Triglycerides 151  114    HDL Cholesterol 47  48    VLDL Cholesterol 26  21    LDL Cholesterol  59  50    LDL/HDL Ratio 1.17  1.00        Results for orders placed in visit on 03/17/22    Adult Transthoracic Echo Complete W/ Cont if Necessary Per Protocol    Interpretation Summary  · Left ventricular wall thickness is consistent with mild concentric hypertrophy.  · Estimated left ventricular EF was in agreement with the calculated left ventricular EF. Left ventricular ejection fraction appears to be 61 - 65%. Left ventricular systolic function is normal.  · Left ventricular diastolic function is consistent with (grade I) impaired relaxation.  · Moderate mitral valve regurgitation is present.  · Estimated right ventricular systolic pressure from tricuspid regurgitation is mildly elevated (35-45 mmHg).  · Mild aortic vakve regurgitation is present.           Procedures    Assessment & Plan  Diagnoses and all orders for this visit:    1. Essential hypertension (Primary)    2. Hypercholesterolemia    3. Atherosclerosis of native coronary artery of native heart without angina pectoris    4. Edema leg    5. Stage 2 chronic kidney disease    Other orders  -     furosemide (LASIX) 40 MG tablet; Take 1 tablet by mouth Daily for 120 days.  Dispense: 30 tablet; Refill: 3    1.  Blood pressure is elevated in the office today but she reports normal blood pressure readings at home.  Continue current medications and  home BP monitoring.  2.  Continue pravastatin 80 mg daily.  Most recent cholesterol panel with LDL 59, HDL 47 and triglycerides 151.  These are under good control.  3.  Patient had a cardiac cath in 2002 with a possible ostial 40% left main stenosis, otherwise no disease.  She has no angina or anginal-like symptoms.  We will continue to monitor clinically.  4.  She has swelling of bilateral lower extremities on exam.  This had resolved during her hospitalization on IV diuretics.  Lasix will be increased to 40 mg daily.  Recommend elevating her lower extremities.  5.  Stage II CKD.  We will check a BMP in 1 week to monitor for decreased kidney function and electrolytes with the increased dose of diuretics.      Follow Up   Return in about 6 months (around 5/7/2024) for With Dr. Romero.    Patient was given instructions and counseling regarding her condition or for health maintenance advice. Please see specific information pulled into the AVS if appropriate.     Tess South, APRN  11/07/23  14:09 EST    Dictated Utilizing Dragon Dictation

## 2023-11-13 NOTE — PROGRESS NOTES
Enter Query Response Below      Query Response: Obesity class 3 with BMI 42.5 with type 2 diabetes and hypertension             If applicable, please update the problem list.     Patient: Devi Diallo        : 1935  Account: [id number]           Admit Date:         How to Respond to this query:       a. Click New Note     b. Answer query within the yellow box.                c. Update the Problem List, if applicable.      If you have any questions about this query contact me at: [email or web address]     Dr. Hadley,    Patient presented 10/21 with shortness of breath. On day of admission, vitals record noted patient's weight 98.8 kg (217 lb 13 oz), BMI 42.5, and IBW 45.5 kg. Notes also include type 2 diabetes and hypertension. Patient was treated with monitoring.     Please clarify if patient treated/monitored for one or more of the following:     - Morbid Obesity  - Obesity - BMI 42.5 with type 2 diabetes and hypertension  - Overweight only  - Other ___________________  - Unable to clinically determine    By submitting this query, we are merely seeking further clarification of documentation to accurately reflect all conditions that you are monitoring, evaluating, treating or that extend the hospitalization or utilize additional resources of care. Please utilize your independent clinical judgment when addressing the question(s) above.     This query and your response, once completed, will be entered into the legal medical record.    Sincerely,  Dung Padilla RN, CDS  Clinical Documentation Integrity Program

## 2023-11-27 ENCOUNTER — LAB (OUTPATIENT)
Dept: LAB | Facility: HOSPITAL | Age: 88
End: 2023-11-27
Payer: MEDICARE

## 2023-11-27 DIAGNOSIS — E11.9 TYPE 2 DIABETES MELLITUS WITHOUT COMPLICATION, WITHOUT LONG-TERM CURRENT USE OF INSULIN: ICD-10-CM

## 2023-11-27 DIAGNOSIS — R60.0 EDEMA LEG: ICD-10-CM

## 2023-11-27 DIAGNOSIS — N18.2 STAGE 2 CHRONIC KIDNEY DISEASE: ICD-10-CM

## 2023-11-27 LAB
ANION GAP SERPL CALCULATED.3IONS-SCNC: 15 MMOL/L (ref 5–15)
BUN SERPL-MCNC: 47 MG/DL (ref 8–23)
BUN/CREAT SERPL: 34.6 (ref 7–25)
CALCIUM SPEC-SCNC: 10.2 MG/DL (ref 8.6–10.5)
CHLORIDE SERPL-SCNC: 97 MMOL/L (ref 98–107)
CO2 SERPL-SCNC: 29 MMOL/L (ref 22–29)
CREAT SERPL-MCNC: 1.36 MG/DL (ref 0.57–1)
EGFRCR SERPLBLD CKD-EPI 2021: 38.3 ML/MIN/1.73
GLUCOSE SERPL-MCNC: 168 MG/DL (ref 65–99)
POTASSIUM SERPL-SCNC: 4.6 MMOL/L (ref 3.5–5.2)
SODIUM SERPL-SCNC: 141 MMOL/L (ref 136–145)

## 2023-11-27 PROCEDURE — 36415 COLL VENOUS BLD VENIPUNCTURE: CPT

## 2023-11-27 PROCEDURE — 80048 BASIC METABOLIC PNL TOTAL CA: CPT

## 2024-01-09 ENCOUNTER — OFFICE VISIT (OUTPATIENT)
Dept: PULMONOLOGY | Facility: CLINIC | Age: 89
End: 2024-01-09
Payer: MEDICARE

## 2024-01-09 VITALS
RESPIRATION RATE: 18 BRPM | HEIGHT: 60 IN | TEMPERATURE: 97.5 F | SYSTOLIC BLOOD PRESSURE: 147 MMHG | WEIGHT: 214 LBS | OXYGEN SATURATION: 98 % | HEART RATE: 67 BPM | BODY MASS INDEX: 42.01 KG/M2 | DIASTOLIC BLOOD PRESSURE: 58 MMHG

## 2024-01-09 DIAGNOSIS — J42 CHRONIC BRONCHITIS, UNSPECIFIED CHRONIC BRONCHITIS TYPE: ICD-10-CM

## 2024-01-09 DIAGNOSIS — J45.40 MODERATE PERSISTENT ASTHMA WITHOUT COMPLICATION: Primary | ICD-10-CM

## 2024-01-09 DIAGNOSIS — J96.11 CHRONIC RESPIRATORY FAILURE WITH HYPOXIA: ICD-10-CM

## 2024-01-09 DIAGNOSIS — R06.09 DYSPNEA ON EXERTION: ICD-10-CM

## 2024-01-09 DIAGNOSIS — F17.211 NICOTINE DEPENDENCE, CIGARETTES, IN REMISSION: ICD-10-CM

## 2024-01-09 DIAGNOSIS — E66.01 MORBID OBESITY WITH BMI OF 40.0-44.9, ADULT: ICD-10-CM

## 2024-01-09 NOTE — PROGRESS NOTES
Primary Care Provider  Jacobo Redmond MD     Referring Provider  No ref. provider found     Chief Complaint  Shortness of Breath (With exertion ), Cough (Clear phlegm ), Asthma, and Follow-up (6 month f/up )    Subjective          Devi Diallo presents to [facility] PULMONARY & CRITICAL CARE MEDICINE  History of Present Illness  Devi Diallo is a 88 y.o. female patient of Dr. Timmons here for management of chronic bronchitis, moderate persistent asthma, seasonal allergies and dyspnea on exertion.     Patient states she is doing okay since her last office visit.  Unfortunately, she was hospitalized in October for an acute exacerbation of congestive heart failure.  She was discharged with oxygen at that time.  Patient is needing recertification at this time.  We will perform a 6-minute walk today in office.  Patient states that her shortness of breath is moderate in severity, worse with exertion and improved with rest.  She continues to use Breo as prescribed.  She will intermittently use albuterol if needed.  She continues to take Zyrtec and Singulair for allergies.  Patient denies using any antibiotics or steroids for her lungs.  She denies any current fevers or chills.  Patient has an occasional cough with clear phlegm.  She continues to wear oxygen at night.  Patient states that overall she is doing well and has no additional respiratory concerns at this time.  She is able to perform her ADLs with minor modifications.  She is up-to-date with her COVID, flu and pneumonia vaccines.     Her history of smoking is   Tobacco Use: Low Risk  (1/9/2024)    Patient History     Smoking Tobacco Use: Never     Smokeless Tobacco Use: Never     Passive Exposure: Never   .    Review of Systems   Constitutional:  Negative for chills, fatigue, fever, unexpected weight gain and unexpected weight loss.   HENT:  Congestion: Nasal.    Respiratory:  Positive for cough and shortness of breath. Negative  for apnea and wheezing.         Negative for Hemoptysis     Cardiovascular:  Positive for leg swelling. Negative for chest pain and palpitations.   Skin:         Negative for cyanosis      Sleep: Negative for Excessive daytime sleepiness  Negative for morning headaches  Negative for Snoring    Family History   Problem Relation Age of Onset    Cancer Mother         Unspecified    Arthritis Mother     Diabetes Brother         Unspecified type    Arthritis Brother         Social History     Socioeconomic History    Marital status:    Tobacco Use    Smoking status: Never     Passive exposure: Never    Smokeless tobacco: Never   Vaping Use    Vaping Use: Never used   Substance and Sexual Activity    Alcohol use: Never    Drug use: Never    Sexual activity: Defer        Past Medical History:   Diagnosis Date    Arthritis     Atherosclerotic heart disease of native coronary artery without angina pectoris 1/1/2002    cardiac catheterization 2002    Diabetes     Essential hypertension 9/20/2012    Hyperlipemia     Hypertension     Pure hypercholesterolemia 4/25/2014    Reflux esophagitis     Seasonal allergies     Stage 2 chronic kidney disease 9/20/2012        Immunization History   Administered Date(s) Administered    COVID-19 (MODERNA) 1st,2nd,3rd Dose Monovalent 02/18/2021, 03/19/2021    Fluzone High Dose =>65 Years ([employer] ONLY) 10/16/2020    Fluzone High-Dose 65+yrs 10/08/2021, 10/05/2022, 10/17/2023    Pneumococcal Conjugate 13-Valent (PCV13) 09/01/2016    Pneumococcal Polysaccharide (PPSV23) 09/01/2019    Pneumococcal, Unspecified 10/20/2019, 10/20/2019         Allergies   Allergen Reactions    Amoxicillin-Pot Clavulanate Hives and Unknown - Low Severity    Cipro [Ciprofloxacin Hcl] Hives    Ciprofloxacin Unknown - Low Severity    Clindamycin Unknown - Low Severity    Clindamycin/Lincomycin Rash    Macrobid [Nitrofurantoin] Hives    Penicillins Hives    Sulfamethoxazole-Trimethoprim Nausea Only and Unknown -  Low Severity          Current Outpatient Medications:     amLODIPine (NORVASC) 5 MG tablet, TAKE ONE TABLET BY MOUTH DAILY (Patient taking differently: Take 1 tablet by mouth Daily.), Disp: 90 tablet, Rfl: 3    Ascorbic Acid (Vitamin C) 500 MG chewable tablet, Chew 500 mg Every Night. Gummies, Disp: , Rfl:     aspirin 81 MG EC tablet, Take 1 tablet by mouth Every Night., Disp: , Rfl:     carvedilol (COREG) 12.5 MG tablet, TAKE ONE AND ONE-HALF TABLETS BY MOUTH TWO TIMES A DAY, Disp: 270 tablet, Rfl: 0    cetirizine (zyrTEC) 10 MG tablet, Take 1 tablet by mouth Daily., Disp: , Rfl:     Cholecalciferol (Vitamin D3) 25 MCG (1000 UT) chewable tablet, Chew 1,000 Units Every Night. Gummies, Disp: , Rfl:     clobetasol (TEMOVATE) 0.05 % cream, APPLY TO AFFECTED AREA(S) TWO TIMES A DAY (Patient taking differently: Apply 1 application  topically to the appropriate area as directed As Needed.), Disp: 45 g, Rfl: 3    Cranberry 500 MG chewable tablet, Chew 1,000 mg Every Night. Gummies, Disp: , Rfl:     estradiol (ESTRACE) 0.1 MG/GM vaginal cream, INSERT 2 GRAMS INTO THE VAGINA DAILY (Patient taking differently: Insert 2 applicators into the vagina 2 (Two) Times a Day As Needed.), Disp: 42.5 g, Rfl: 12    ezetimibe (ZETIA) 10 MG tablet, TAKE ONE TABLET BY MOUTH DAILY (Patient taking differently: Take 1 tablet by mouth Every Night.), Disp: 90 tablet, Rfl: 3    ferrous sulfate 325 (65 FE) MG tablet, Take 1 tablet by mouth Daily With Breakfast. (Patient taking differently: Take 1 tablet by mouth Every Night.), Disp: 90 tablet, Rfl: 3    Fluticasone Furoate-Vilanterol (Breo Ellipta) 200-25 MCG/ACT inhaler, Inhale 1 puff Daily., Disp: 60 each, Rfl: 11    furosemide (LASIX) 40 MG tablet, Take 1 tablet by mouth Daily for 120 days., Disp: 30 tablet, Rfl: 3    glimepiride (AMARYL) 2 MG tablet, TAKE ONE TABLET BY MOUTH TWICE A DAY (Patient taking differently: 1 tab PO Q AM if needed), Disp: 180 tablet, Rfl: 1    glucose blood test strip,  Check glucose bid. Diagnosis: E11.9, Disp: 100 each, Rfl: 5    Januvia 50 MG tablet, TAKE ONE TABLET BY MOUTH DAILY (Patient taking differently: Take 1 tablet by mouth Daily.), Disp: 90 tablet, Rfl: 3    losartan (COZAAR) 50 MG tablet, TAKE ONE TABLET BY MOUTH TWICE A DAY (Patient taking differently: Take 1 tablet by mouth 2 (Two) Times a Day.), Disp: 180 tablet, Rfl: 2    metFORMIN (GLUCOPHAGE) 850 MG tablet, TAKE ONE TABLET BY MOUTH TWICE A DAY (Patient taking differently: Take 1 tablet by mouth 2 (Two) Times a Day With Meals.), Disp: 180 tablet, Rfl: 3    montelukast (SINGULAIR) 10 MG tablet, Take 1 tablet by mouth Every Morning., Disp: , Rfl:     multivitamin with minerals tablet tablet, Take 1 tablet by mouth Every Night. Gummies, Disp: , Rfl:     nitroglycerin (NITROSTAT) 0.4 MG SL tablet, 1 under the tongue as needed for angina, may repeat q5mins for up three doses, Disp: 100 tablet, Rfl: 11    pantoprazole (PROTONIX) 40 MG EC tablet, TAKE ONE TABLET BY MOUTH DAILY (Patient taking differently: Take 1 tablet by mouth Daily.), Disp: 90 tablet, Rfl: 3    PARoxetine (PAXIL) 40 MG tablet, Take 1 tablet by mouth Daily. (Patient taking differently: Take 1 tablet by mouth Every Night.), Disp: 90 tablet, Rfl: 3    pravastatin (PRAVACHOL) 80 MG tablet, TAKE ONE TABLET BY MOUTH DAILY (Patient taking differently: Take 1 tablet by mouth Every Night.), Disp: 90 tablet, Rfl: 3    Zinc 25 MG tablet, Take 25 mg by mouth Every Night. Gummies, Disp: , Rfl:      Objective   Physical Exam  Constitutional:       General: She is not in acute distress.     Appearance: Normal appearance. She is overweight.   HENT:      Right Ear: Hearing normal.      Left Ear: Hearing normal.      Nose: No nasal tenderness or congestion.      Mouth/Throat:      Mouth: Mucous membranes are moist. No oral lesions.   Eyes:      Extraocular Movements: Extraocular movements intact.      Pupils: Pupils are equal, round, and reactive to light.   Neck:       "Thyroid: No thyroid mass or thyromegaly.   Cardiovascular:      Rate and Rhythm: Normal rate and regular rhythm.      Pulses: Normal pulses.      Heart sounds: Normal heart sounds. No murmur heard.  Pulmonary:      Effort: Pulmonary effort is normal.      Breath sounds: Decreased breath sounds present. No wheezing, rhonchi or rales.      Comments: Patient is on 3 L of oxygen.  She is able to speak full sentences without difficulty.  Musculoskeletal:      Cervical back: Neck supple.      Right lower leg: Edema present.      Left lower leg: Edema present.   Lymphadenopathy:      Cervical: No cervical adenopathy.      Upper Body:      Right upper body: No axillary adenopathy.   Skin:     General: Skin is warm and dry.      Findings: No lesion or rash.   Neurological:      General: No focal deficit present.      Mental Status: She is alert and oriented to person, place, and time.   Psychiatric:         Mood and Affect: Affect normal. Mood is not anxious or depressed.         Vital Signs:   /58 (BP Location: Left arm, Patient Position: Sitting, Cuff Size: Adult)   Pulse 67   Temp 97.5 °F (36.4 °C) (Temporal)   Resp 18   Ht 152.4 cm (60\")   Wt 97.1 kg (214 lb)   SpO2 98% Comment: 4 L's C  BMI 41.79 kg/m²        Result Review :   The following data was reviewed by: WALTER Lam on 01/09/2024:  CMP          10/22/2023    04:33 10/23/2023    05:23 11/27/2023    09:15   CMP   Glucose 210  179  168    BUN 37  38  47    Creatinine 1.21  1.19  1.36    EGFR 44.0  44.9  38.3    Sodium 138  139  141    Potassium 5.3  4.7  4.6    Chloride 105  104  97    Calcium 9.5  9.4  10.2    BUN/Creatinine Ratio 30.6  31.9  34.6    Anion Gap 7.6  9.4  15.0      CBC w/diff          8/18/2023    15:34 8/31/2023    09:23 10/21/2023    10:03   CBC w/Diff   WBC 13.07  12.46  9.78    RBC 3.83  3.80  3.70    Hemoglobin 11.5  11.2  10.6    Hematocrit 35.3  34.7  35.5    MCV 92.2  91.3  95.9    MCH 30.0  29.5  28.6    MCHC 32.6  " 32.3  29.9    RDW 13.3  13.2  16.3    Platelets 290  285  226    Neutrophil Rel % 74.0  78.1  81.3    Immature Granulocyte Rel % 0.3  0.5  0.3    Lymphocyte Rel % 15.1  12.3  9.2    Monocyte Rel % 8.4  6.9  5.9    Eosinophil Rel % 1.9  2.0  3.0    Basophil Rel % 0.3  0.2  0.3      Data reviewed : Radiologic studies chest CT 7/15/2019, chest x-ray 10/22/2023, pulmonary function test 5/16/2019, Cardiology studies echocardiogram 3/18/2022, Recent hospitalization notes Hospital discharge summary 10/24/2023, and my last office note    Procedures        Assessment and Plan    Diagnoses and all orders for this visit:    1. Moderate persistent asthma without complication (Primary)  -     6 Minute Walk Test; Future  -     Oxygen Therapy    2. Chronic bronchitis, unspecified chronic bronchitis type  -     6 Minute Walk Test; Future  -     Oxygen Therapy    3. Chronic respiratory failure with hypoxia  -     6 Minute Walk Test; Future  -     Oxygen Therapy    4. Nicotine dependence, cigarettes, in remission    5. Dyspnea on exertion    6. Morbid obesity with BMI of 40.0-44.9, adult    7.  Continue Breo as prescribed.  Rinse mouth out after each use.  8.  Continue albuterol as needed.  9.  Continue Zyrtec and Singulair.  10.  Encourage patient try to stay as active as possible.  Recommend 30 minutes of daily activity.  11.  6-minute walk performed in office today.  Patient did desaturate less than 88% and required 3 L of oxygen to maintain saturations above 90%.  12.  Follow-up with Harleen 3 months, sooner if needed        Follow Up   Return in about 3 months (around 4/9/2024) for Recheck with Harleen.  Patient was given instructions and counseling regarding her condition or for health maintenance advice. Please see specific information pulled into the AVS if appropriate.

## 2024-01-11 DIAGNOSIS — F41.9 ANXIETY: ICD-10-CM

## 2024-01-11 RX ORDER — PAROXETINE HYDROCHLORIDE 40 MG/1
40 TABLET, FILM COATED ORAL DAILY
Qty: 90 TABLET | Refills: 3 | Status: SHIPPED | OUTPATIENT
Start: 2024-01-11

## 2024-01-15 RX ORDER — MONTELUKAST SODIUM 10 MG/1
10 TABLET ORAL EVERY MORNING
Qty: 30 TABLET | Refills: 5 | Status: SHIPPED | OUTPATIENT
Start: 2024-01-15

## 2024-01-15 NOTE — TELEPHONE ENCOUNTER
Caller: WILFRIDO SALDIVAR NO VERBAL ON FILE FOR THIS OFFICE    Relationship: Emergency Contact    Best call back number:     [phone]       Requested Prescriptions:   Requested Prescriptions     Pending Prescriptions Disp Refills    montelukast (SINGULAIR) 10 MG tablet       Sig: Take 1 tablet by mouth Every Morning.        Pharmacy where request should be sent: [facility] [id number] [place], [place] - [address] AT [facility] [address] [facility] [phone] [facility] [phone] FX     Last office visit with prescribing clinician: 9/5/2023   Last telemedicine visit with prescribing clinician: Visit date not found   Next office visit with prescribing clinician: 3/6/2024     Additional details provided by patient: PATIENT'S DAUGHTER STATES THAT THIS WAS PRESCRIBED BY ANOTHER PROVIDER WHOSE OFFICE HAS CLOSED. SHE STATES THAT THEY WOULD LIKE FOR DR DAVID TO TAKE OVER THIS PRESCRIPTION. SHE STATES THAT THE PATIENT ONLY HAS ENOUGH PILLS LEFT FOR THIS WEEK.     Does the patient have less than a 3 day supply:  [] Yes  [] No    Would you like a call back once the refill request has been completed: [x] Yes [] No    If the office needs to give you a call back, can they leave a voicemail: [] Yes [] No    Mary Ellen Carson Rep   01/15/24 14:53 EST

## 2024-02-01 RX ORDER — CARVEDILOL 12.5 MG/1
TABLET ORAL
Qty: 270 TABLET | Refills: 0 | Status: SHIPPED | OUTPATIENT
Start: 2024-02-01

## 2024-03-04 RX ORDER — FUROSEMIDE 40 MG/1
40 TABLET ORAL DAILY
Qty: 90 TABLET | Refills: 3 | Status: SHIPPED | OUTPATIENT
Start: 2024-03-04

## 2024-03-05 ENCOUNTER — LAB (OUTPATIENT)
Dept: LAB | Facility: HOSPITAL | Age: 89
End: 2024-03-05
Payer: MEDICARE

## 2024-03-05 DIAGNOSIS — E11.9 TYPE 2 DIABETES MELLITUS WITHOUT COMPLICATION, WITHOUT LONG-TERM CURRENT USE OF INSULIN: ICD-10-CM

## 2024-03-05 DIAGNOSIS — K21.9 GASTRO-ESOPHAGEAL REFLUX DISEASE WITHOUT ESOPHAGITIS: ICD-10-CM

## 2024-03-05 DIAGNOSIS — D72.9 WHITE BLOOD CELL DISORDER: ICD-10-CM

## 2024-03-05 DIAGNOSIS — N18.31 STAGE 3A CHRONIC KIDNEY DISEASE: ICD-10-CM

## 2024-03-05 DIAGNOSIS — Z00.00 MEDICARE ANNUAL WELLNESS VISIT, SUBSEQUENT: ICD-10-CM

## 2024-03-05 DIAGNOSIS — I10 ESSENTIAL HYPERTENSION: ICD-10-CM

## 2024-03-05 LAB
ALBUMIN SERPL-MCNC: 4.1 G/DL (ref 3.5–5.2)
ALBUMIN/GLOB SERPL: 1.5 G/DL
ALP SERPL-CCNC: 55 U/L (ref 39–117)
ALT SERPL W P-5'-P-CCNC: 11 U/L (ref 1–33)
ANION GAP SERPL CALCULATED.3IONS-SCNC: 12.1 MMOL/L (ref 5–15)
AST SERPL-CCNC: 10 U/L (ref 1–32)
BASOPHILS # BLD AUTO: 0.03 10*3/MM3 (ref 0–0.2)
BASOPHILS NFR BLD AUTO: 0.3 % (ref 0–1.5)
BILIRUB SERPL-MCNC: 0.4 MG/DL (ref 0–1.2)
BUN SERPL-MCNC: 36 MG/DL (ref 8–23)
BUN/CREAT SERPL: 34.3 (ref 7–25)
CALCIUM SPEC-SCNC: 10.1 MG/DL (ref 8.6–10.5)
CHLORIDE SERPL-SCNC: 101 MMOL/L (ref 98–107)
CHOLEST SERPL-MCNC: 116 MG/DL (ref 0–200)
CO2 SERPL-SCNC: 26.9 MMOL/L (ref 22–29)
CREAT SERPL-MCNC: 1.05 MG/DL (ref 0.57–1)
DEPRECATED RDW RBC AUTO: 46.3 FL (ref 37–54)
EGFRCR SERPLBLD CKD-EPI 2021: 51.9 ML/MIN/1.73
EOSINOPHIL # BLD AUTO: 0.14 10*3/MM3 (ref 0–0.4)
EOSINOPHIL NFR BLD AUTO: 1.5 % (ref 0.3–6.2)
ERYTHROCYTE [DISTWIDTH] IN BLOOD BY AUTOMATED COUNT: 13.9 % (ref 12.3–15.4)
GLOBULIN UR ELPH-MCNC: 2.7 GM/DL
GLUCOSE SERPL-MCNC: 150 MG/DL (ref 65–99)
HBA1C MFR BLD: 6.3 % (ref 4.8–5.6)
HCT VFR BLD AUTO: 34.2 % (ref 34–46.6)
HDLC SERPL-MCNC: 51 MG/DL (ref 40–60)
HGB BLD-MCNC: 10.9 G/DL (ref 12–15.9)
IMM GRANULOCYTES # BLD AUTO: 0.01 10*3/MM3 (ref 0–0.05)
IMM GRANULOCYTES NFR BLD AUTO: 0.1 % (ref 0–0.5)
LDLC SERPL CALC-MCNC: 47 MG/DL (ref 0–100)
LDLC/HDLC SERPL: 0.9 {RATIO}
LYMPHOCYTES # BLD AUTO: 1.28 10*3/MM3 (ref 0.7–3.1)
LYMPHOCYTES NFR BLD AUTO: 13.6 % (ref 19.6–45.3)
MCH RBC QN AUTO: 29.5 PG (ref 26.6–33)
MCHC RBC AUTO-ENTMCNC: 31.9 G/DL (ref 31.5–35.7)
MCV RBC AUTO: 92.7 FL (ref 79–97)
MONOCYTES # BLD AUTO: 0.69 10*3/MM3 (ref 0.1–0.9)
MONOCYTES NFR BLD AUTO: 7.3 % (ref 5–12)
NEUTROPHILS NFR BLD AUTO: 7.29 10*3/MM3 (ref 1.7–7)
NEUTROPHILS NFR BLD AUTO: 77.2 % (ref 42.7–76)
NRBC BLD AUTO-RTO: 0 /100 WBC (ref 0–0.2)
PLATELET # BLD AUTO: 248 10*3/MM3 (ref 140–450)
PMV BLD AUTO: 9.8 FL (ref 6–12)
POTASSIUM SERPL-SCNC: 5.1 MMOL/L (ref 3.5–5.2)
PROT SERPL-MCNC: 6.8 G/DL (ref 6–8.5)
RBC # BLD AUTO: 3.69 10*6/MM3 (ref 3.77–5.28)
SODIUM SERPL-SCNC: 140 MMOL/L (ref 136–145)
TRIGL SERPL-MCNC: 95 MG/DL (ref 0–150)
VLDLC SERPL-MCNC: 18 MG/DL (ref 5–40)
WBC NRBC COR # BLD AUTO: 9.44 10*3/MM3 (ref 3.4–10.8)

## 2024-03-05 PROCEDURE — 36415 COLL VENOUS BLD VENIPUNCTURE: CPT

## 2024-03-05 PROCEDURE — 83036 HEMOGLOBIN GLYCOSYLATED A1C: CPT

## 2024-03-05 PROCEDURE — 85025 COMPLETE CBC W/AUTO DIFF WBC: CPT

## 2024-03-05 PROCEDURE — 80053 COMPREHEN METABOLIC PANEL: CPT

## 2024-03-05 PROCEDURE — 80061 LIPID PANEL: CPT

## 2024-03-06 ENCOUNTER — OFFICE VISIT (OUTPATIENT)
Dept: INTERNAL MEDICINE | Facility: CLINIC | Age: 89
End: 2024-03-06
Payer: MEDICARE

## 2024-03-06 VITALS
DIASTOLIC BLOOD PRESSURE: 84 MMHG | OXYGEN SATURATION: 92 % | HEART RATE: 78 BPM | BODY MASS INDEX: 40.64 KG/M2 | WEIGHT: 207 LBS | HEIGHT: 60 IN | SYSTOLIC BLOOD PRESSURE: 172 MMHG | TEMPERATURE: 98.2 F

## 2024-03-06 DIAGNOSIS — N18.31 STAGE 3A CHRONIC KIDNEY DISEASE: ICD-10-CM

## 2024-03-06 DIAGNOSIS — R06.09 DYSPNEA ON EXERTION: ICD-10-CM

## 2024-03-06 DIAGNOSIS — J45.41 MODERATE PERSISTENT ASTHMA WITH ACUTE EXACERBATION: ICD-10-CM

## 2024-03-06 DIAGNOSIS — I10 ESSENTIAL HYPERTENSION: Chronic | ICD-10-CM

## 2024-03-06 DIAGNOSIS — F41.9 ANXIETY: Primary | ICD-10-CM

## 2024-03-06 DIAGNOSIS — K21.9 GASTRO-ESOPHAGEAL REFLUX DISEASE WITHOUT ESOPHAGITIS: ICD-10-CM

## 2024-03-06 DIAGNOSIS — D50.9 IRON DEFICIENCY ANEMIA, UNSPECIFIED IRON DEFICIENCY ANEMIA TYPE: ICD-10-CM

## 2024-03-06 DIAGNOSIS — E11.9 TYPE 2 DIABETES MELLITUS WITHOUT COMPLICATION, WITHOUT LONG-TERM CURRENT USE OF INSULIN: ICD-10-CM

## 2024-03-06 DIAGNOSIS — E66.01 CLASS 3 SEVERE OBESITY DUE TO EXCESS CALORIES WITH SERIOUS COMORBIDITY AND BODY MASS INDEX (BMI) OF 40.0 TO 44.9 IN ADULT: ICD-10-CM

## 2024-03-06 DIAGNOSIS — N39.41 URGE INCONTINENCE OF URINE: ICD-10-CM

## 2024-03-06 RX ORDER — POLYMYXIN B SULFATE AND TRIMETHOPRIM 1; 10000 MG/ML; [USP'U]/ML
1 SOLUTION OPHTHALMIC EVERY 4 HOURS
Qty: 1 EACH | Refills: 1 | Status: SHIPPED | OUTPATIENT
Start: 2024-03-06

## 2024-03-06 NOTE — PROGRESS NOTES
"CHIEF COMPLAINT/ HPI: Patient is here to follow-up with her routine 6-month check, she says she is doing well, she says she wants to drive again she just got her 's license renewed, she is here to follow-up with diabetes and blood pressure, she had no falls she is here with her daughter today,  Diabetes (Routine follow up, lab follow up. )              Objective   Vital Signs  Vitals:    03/06/24 1141   BP: 172/84   Pulse: 78   Temp: 98.2 °F (36.8 °C)   SpO2: 92%   Weight: 93.9 kg (207 lb)   Height: 152.4 cm (60\")      Body mass index is 40.43 kg/m².  Review of Systems   Constitutional: Negative.    HENT: Negative.     Eyes: Negative.    Respiratory: Negative.     Cardiovascular: Negative.    Gastrointestinal: Negative.    Endocrine: Negative.    Genitourinary: Negative.    Musculoskeletal: Negative.    Allergic/Immunologic: Negative.    Neurological: Negative.    Hematological: Negative.    Psychiatric/Behavioral: Negative.        Physical Exam  Constitutional:       General: She is not in acute distress.     Appearance: Normal appearance. She is obese.   HENT:      Head: Normocephalic.      Mouth/Throat:      Mouth: Mucous membranes are moist.   Eyes:      Conjunctiva/sclera: Conjunctivae normal.      Pupils: Pupils are equal, round, and reactive to light.   Cardiovascular:      Rate and Rhythm: Normal rate and regular rhythm.      Pulses: Normal pulses.      Heart sounds: Normal heart sounds.   Pulmonary:      Effort: Pulmonary effort is normal.      Breath sounds: Normal breath sounds.   Abdominal:      General: Bowel sounds are normal.      Palpations: Abdomen is soft.   Musculoskeletal:         General: No swelling. Normal range of motion.      Cervical back: Neck supple.   Skin:     General: Skin is warm and dry.      Coloration: Skin is not jaundiced.   Neurological:      General: No focal deficit present.      Mental Status: She is alert and oriented to person, place, and time. Mental status is at " baseline.   Psychiatric:         Mood and Affect: Mood normal.         Behavior: Behavior normal.         Thought Content: Thought content normal.         Judgment: Judgment normal.     Mild redness and swelling of the right lower eyelid with some yellow drainage,  Result Review :   Lab Results   Component Value Date    PROBNP 394.7 10/21/2023    PROBNP 77.4 10/26/2021    PROBNP 72.2 06/14/2021    BNP 52 04/16/2019     CMP          10/23/2023    05:23 11/27/2023    09:15 3/5/2024    09:08   CMP   Glucose 179  168  150    BUN 38  47  36    Creatinine 1.19  1.36  1.05    EGFR 44.9  38.3  51.9    Sodium 139  141  140    Potassium 4.7  4.6  5.1    Chloride 104  97  101    Calcium 9.4  10.2  10.1    Total Protein   6.8    Albumin   4.1    Globulin   2.7    Total Bilirubin   0.4    Alkaline Phosphatase   55    AST (SGOT)   10    ALT (SGPT)   11    Albumin/Globulin Ratio   1.5    BUN/Creatinine Ratio 31.9  34.6  34.3    Anion Gap 9.4  15.0  12.1      CBC w/diff          8/31/2023    09:23 10/21/2023    10:03 3/5/2024    09:08   CBC w/Diff   WBC 12.46  9.78  9.44    RBC 3.80  3.70  3.69    Hemoglobin 11.2  10.6  10.9    Hematocrit 34.7  35.5  34.2    MCV 91.3  95.9  92.7    MCH 29.5  28.6  29.5    MCHC 32.3  29.9  31.9    RDW 13.2  16.3  13.9    Platelets 285  226  248    Neutrophil Rel % 78.1  81.3  77.2    Immature Granulocyte Rel % 0.5  0.3  0.1    Lymphocyte Rel % 12.3  9.2  13.6    Monocyte Rel % 6.9  5.9  7.3    Eosinophil Rel % 2.0  3.0  1.5    Basophil Rel % 0.2  0.3  0.3       Lipid Panel          8/31/2023    09:23 3/5/2024    09:08   Lipid Panel   Total Cholesterol 119  116    Triglycerides 114  95    HDL Cholesterol 48  51    VLDL Cholesterol 21  18    LDL Cholesterol  50  47    LDL/HDL Ratio 1.00  0.90       Lab Results   Component Value Date    TSH 2.320 08/31/2023    TSH 2.590 11/16/2020    TSH 2.860 11/05/2019      Lab Results   Component Value Date    FREET4 1.33 08/31/2023    FREET4 1.2 08/15/2019      A1C  Last 3 Results          8/31/2023    09:23 3/5/2024    09:08   HGBA1C Last 3 Results   Hemoglobin A1C 6.70  6.30                        Visit Diagnoses:    ICD-10-CM ICD-9-CM   1. Anxiety  F41.9 300.00   2. Type 2 diabetes mellitus without complication, without long-term current use of insulin  E11.9 250.00   3. Essential hypertension  I10 401.9   4. Class 3 severe obesity due to excess calories with serious comorbidity and body mass index (BMI) of 40.0 to 44.9 in adult  E66.01 278.01    Z68.41 V85.41   5. Stage 3a chronic kidney disease  N18.31 585.3   6. Moderate persistent asthma with acute exacerbation  J45.41 493.92   7. Urge incontinence of urine  N39.41 788.31   8. Iron deficiency anemia, unspecified iron deficiency anemia type  D50.9 280.9   9. Gastro-esophageal reflux disease without esophagitis  K21.9 530.81   10. Dyspnea on exertion  R06.09 786.09       Assessment and Plan   Diagnoses and all orders for this visit:    1. Anxiety (Primary)  -     MicroAlbumin, Urine, Random - Urine, Clean Catch; Future  -     TSH+Free T4; Future  -     Hemoglobin A1c; Future  -     Comprehensive Metabolic Panel; Future  -     CBC & Differential; Future  -     Lipid Panel; Future  -     proBNP; Future    2. Type 2 diabetes mellitus without complication, without long-term current use of insulin  -     MicroAlbumin, Urine, Random - Urine, Clean Catch; Future  -     TSH+Free T4; Future  -     Hemoglobin A1c; Future  -     Comprehensive Metabolic Panel; Future  -     CBC & Differential; Future  -     Lipid Panel; Future  -     proBNP; Future    3. Essential hypertension  -     MicroAlbumin, Urine, Random - Urine, Clean Catch; Future  -     TSH+Free T4; Future  -     Hemoglobin A1c; Future  -     Comprehensive Metabolic Panel; Future  -     CBC & Differential; Future  -     Lipid Panel; Future  -     proBNP; Future    4. Class 3 severe obesity due to excess calories with serious comorbidity and body mass index (BMI) of 40.0 to  44.9 in adult  -     MicroAlbumin, Urine, Random - Urine, Clean Catch; Future  -     TSH+Free T4; Future  -     Hemoglobin A1c; Future  -     Comprehensive Metabolic Panel; Future  -     CBC & Differential; Future  -     Lipid Panel; Future  -     proBNP; Future    5. Stage 3a chronic kidney disease  -     MicroAlbumin, Urine, Random - Urine, Clean Catch; Future  -     TSH+Free T4; Future  -     Hemoglobin A1c; Future  -     Comprehensive Metabolic Panel; Future  -     CBC & Differential; Future  -     Lipid Panel; Future  -     proBNP; Future    6. Moderate persistent asthma with acute exacerbation  -     MicroAlbumin, Urine, Random - Urine, Clean Catch; Future  -     TSH+Free T4; Future  -     Hemoglobin A1c; Future  -     Comprehensive Metabolic Panel; Future  -     CBC & Differential; Future  -     Lipid Panel; Future  -     proBNP; Future    7. Urge incontinence of urine  -     MicroAlbumin, Urine, Random - Urine, Clean Catch; Future  -     TSH+Free T4; Future  -     Hemoglobin A1c; Future  -     Comprehensive Metabolic Panel; Future  -     CBC & Differential; Future  -     Lipid Panel; Future  -     proBNP; Future    8. Iron deficiency anemia, unspecified iron deficiency anemia type  -     MicroAlbumin, Urine, Random - Urine, Clean Catch; Future  -     TSH+Free T4; Future  -     Hemoglobin A1c; Future  -     Comprehensive Metabolic Panel; Future  -     CBC & Differential; Future  -     Lipid Panel; Future  -     proBNP; Future    9. Gastro-esophageal reflux disease without esophagitis  -     MicroAlbumin, Urine, Random - Urine, Clean Catch; Future  -     TSH+Free T4; Future  -     Hemoglobin A1c; Future  -     Comprehensive Metabolic Panel; Future  -     CBC & Differential; Future  -     Lipid Panel; Future  -     proBNP; Future    10. Dyspnea on exertion  -     proBNP; Future    Leukocytosis in the past currently normal March 6, 2024    Mild conjunctivitis right lower eyelid will send in some    lower  extremity edema ---continue Lasix 20 mg daily, as needed,     Hyperkalemia mild -currently off potassium supplements March 2024     Asthma and prior exac. - SEEING DR BANKS --- continues CARLY CHIU, NOV 2019, ---PULM NOD. ON CT APRIL 2019, REPEATED CT ABD/PELVIS AND CHEST ---JUNE 2019, ---------     Getting home oxygen wearing it at night as of March 2024     MRI OF ABD FOR RENAL MASS----- JULY, 2019,      ALLERGRIES , CONT ZYRTEC AND SINGULAIR    Heart murmur, will follow clinically previous echo showed diastolic dysfunction moderate mitral valve regurgitation mild aortic valve regurgitation normal ejection fraction, mild LVH     HTN -- cont NORVASC 5 mg daily , Coreg 18.375 twice a day, losartan 50 bid,     IRON DEF ANEMIA, continues OTC iron supplements and vitamin C---- does not want to do colonoscopy endoscopy at this time, hemoglobin stable 10.9 March 5, 2024     DM , continue AMARYL 1 mg qam , Januvia 50 mg daily, metformin 850 mg twice a day,----hemoglobin A1c 6.3, March 2024     OBESITY, discussed October 2021     DEPRESSION-   continue Paxil 40 mg daily     ELEVATED CHOL-continues PRAVASTATIN 80 MG QHS      CKD stage IIIa, stable aug 2023 --,     Urge inCONTINENCE-continues OXYBUTYNIN daily,    NEUROPATHY, RX OPTIONS DISCUSSED --B VITAMINS REC      EYE CHECKED --DOES JUAN LUIS, JAN 2023     DOES JUAN LUIS NICHOLAS IN [place] --OCT 2017, HAD DIAGN. MAMMO AND DID BX , BENIGN--LMAMMO NOV 2019, [place] ,--OK TO STOP MAY 2020,                 Follow Up   Return in about 6 months (around 9/6/2024).  Patient was given instructions and counseling regarding her condition or for health maintenance advice. Please see specific information pulled into the AVS if appropriate.

## 2024-03-26 RX ORDER — SITAGLIPTIN 50 MG/1
TABLET, FILM COATED ORAL
Qty: 90 TABLET | Refills: 3 | Status: SHIPPED | OUTPATIENT
Start: 2024-03-26

## 2024-04-08 ENCOUNTER — TELEPHONE (OUTPATIENT)
Dept: CARDIOLOGY | Facility: CLINIC | Age: 89
End: 2024-04-08
Payer: MEDICARE

## 2024-04-08 NOTE — TELEPHONE ENCOUNTER
The [facility] received a fax that requires your attention. The document has been indexed to the patient’s chart for your review.      Reason for sending: EXTERNAL MEDICAL RECORD NOTIFICATION     Documents Description: MEDS-EZETIMIBE REFILL-4.8.24    Name of Sender: YOANA     Date Indexed: 4.8.24

## 2024-04-09 RX ORDER — EZETIMIBE 10 MG/1
10 TABLET ORAL NIGHTLY
Qty: 90 TABLET | Refills: 3 | Status: SHIPPED | OUTPATIENT
Start: 2024-04-09

## 2024-04-15 RX ORDER — PANTOPRAZOLE SODIUM 40 MG/1
TABLET, DELAYED RELEASE ORAL
Qty: 90 TABLET | Refills: 3 | Status: SHIPPED | OUTPATIENT
Start: 2024-04-15

## 2024-04-17 ENCOUNTER — OFFICE VISIT (OUTPATIENT)
Dept: PULMONOLOGY | Facility: CLINIC | Age: 89
End: 2024-04-17
Payer: MEDICARE

## 2024-04-17 VITALS
DIASTOLIC BLOOD PRESSURE: 69 MMHG | SYSTOLIC BLOOD PRESSURE: 130 MMHG | HEART RATE: 71 BPM | TEMPERATURE: 98 F | RESPIRATION RATE: 18 BRPM | WEIGHT: 215.6 LBS | BODY MASS INDEX: 42.33 KG/M2 | HEIGHT: 60 IN | OXYGEN SATURATION: 93 %

## 2024-04-17 DIAGNOSIS — J30.2 SEASONAL ALLERGIES: ICD-10-CM

## 2024-04-17 DIAGNOSIS — J42 CHRONIC BRONCHITIS, UNSPECIFIED CHRONIC BRONCHITIS TYPE: ICD-10-CM

## 2024-04-17 DIAGNOSIS — E66.01 OBESITY, CLASS III, BMI 40-49.9 (MORBID OBESITY): ICD-10-CM

## 2024-04-17 DIAGNOSIS — J45.40 MODERATE PERSISTENT ASTHMA WITHOUT COMPLICATION: Primary | Chronic | ICD-10-CM

## 2024-04-17 DIAGNOSIS — R06.09 DYSPNEA ON EXERTION: ICD-10-CM

## 2024-04-17 DIAGNOSIS — J96.11 CHRONIC RESPIRATORY FAILURE WITH HYPOXIA: Chronic | ICD-10-CM

## 2024-04-17 RX ORDER — OXYCODONE HYDROCHLORIDE AND ACETAMINOPHEN 5; 325 MG/1; MG/1
TABLET ORAL
COMMUNITY
Start: 2024-03-27

## 2024-04-17 RX ORDER — ALBUTEROL SULFATE 90 UG/1
2 AEROSOL, METERED RESPIRATORY (INHALATION) EVERY 4 HOURS PRN
COMMUNITY

## 2024-04-17 RX ORDER — FLUCONAZOLE 150 MG/1
TABLET ORAL
COMMUNITY
Start: 2024-03-27 | End: 2024-04-17

## 2024-04-17 RX ORDER — CHLORHEXIDINE GLUCONATE ORAL RINSE 1.2 MG/ML
SOLUTION DENTAL
COMMUNITY
Start: 2024-04-06

## 2024-04-17 RX ORDER — AZITHROMYCIN 250 MG/1
TABLET, FILM COATED ORAL
COMMUNITY
Start: 2024-02-05 | End: 2024-04-17

## 2024-04-17 NOTE — PROGRESS NOTES
Primary Care Provider  Jacobo Redmond MD     Referring Provider  No ref. provider found     Chief Complaint  Asthma, Shortness of Breath (With exertion ), and Follow-up (3 month f/up )    Subjective          Devi Diallo presents to [facility]  History of Present Illness  Devi Diallo is a 88 y.o. female patient of Dr. Timmons here for management of chronic bronchitis, moderate persistent asthma, seasonal allergies and dyspnea on exertion.     Patient states she is doing okay since her last office visit.  She denies using any antibiotics or steroids for her lungs.  She denies any current fevers or chills.  Patient states that her shortness of breath is moderate in severity, worse with exertion and improved with rest. She continues to use Breo as prescribed. She states that she uses her albuterol 1-2 times a week.  She continues to take Zyrtec and Singulair for allergies.  She wears 4 L of oxygen pulsed dose during the day as needed and continuous at night.  She is using and benefiting from her oxygen.  Overall, she states that she is doing well and has no additional concerns at this time.  She is able to perform her ADLs without difficulty.  She is up-to-date with her COVID, flu and pneumonia vaccines.     Her history of smoking is   Tobacco Use: Low Risk  (4/17/2024)    Patient History     Smoking Tobacco Use: Never     Smokeless Tobacco Use: Never     Passive Exposure: Never   .    Review of Systems   Constitutional:  Negative for chills, fatigue, fever, unexpected weight gain and unexpected weight loss.   HENT:  Congestion: Nasal.    Respiratory:  Positive for shortness of breath. Negative for apnea, cough and wheezing.         Negative for Hemoptysis     Cardiovascular:  Negative for chest pain, palpitations and leg swelling.   Skin:         Negative for cyanosis      Sleep: Negative for Excessive daytime sleepiness  Negative for morning  headaches  Negative for Snoring    Family History   Problem Relation Age of Onset    Cancer Mother         Unspecified    Arthritis Mother     Diabetes Brother         Unspecified type    Arthritis Brother         Social History     Socioeconomic History    Marital status:    Tobacco Use    Smoking status: Never     Passive exposure: Never    Smokeless tobacco: Never   Vaping Use    Vaping status: Never Used   Substance and Sexual Activity    Alcohol use: Never    Drug use: Never    Sexual activity: Defer        Past Medical History:   Diagnosis Date    Arthritis     Atherosclerotic heart disease of native coronary artery without angina pectoris 1/1/2002    cardiac catheterization 2002    Diabetes     Essential hypertension 9/20/2012    Hyperlipemia     Hypertension     Pure hypercholesterolemia 4/25/2014    Reflux esophagitis     Seasonal allergies     Stage 2 chronic kidney disease 9/20/2012        Immunization History   Administered Date(s) Administered    COVID-19 (MODERNA) 1st,2nd,3rd Dose Monovalent 02/18/2021, 03/19/2021    Fluzone High Dose =>65 Years ([employer] ONLY) 10/16/2020    Fluzone High-Dose 65+yrs 10/08/2021, 10/05/2022, 10/17/2023    Pneumococcal Conjugate 13-Valent (PCV13) 09/01/2016    Pneumococcal Polysaccharide (PPSV23) 09/01/2019    Pneumococcal, Unspecified 10/20/2019, 10/20/2019         Allergies   Allergen Reactions    Amoxicillin-Pot Clavulanate Hives and Unknown - Low Severity    Cipro [Ciprofloxacin Hcl] Hives    Ciprofloxacin Unknown - Low Severity    Clindamycin Unknown - Low Severity    Clindamycin/Lincomycin Rash    Macrobid [Nitrofurantoin] Hives    Penicillins Hives    Sulfamethoxazole-Trimethoprim Nausea Only and Unknown - Low Severity          Current Outpatient Medications:     amLODIPine (NORVASC) 5 MG tablet, TAKE ONE TABLET BY MOUTH DAILY (Patient taking differently: Take 1 tablet by mouth Daily.), Disp: 90 tablet, Rfl: 3    Ascorbic Acid (Vitamin C) 500 MG chewable  tablet, Chew 500 mg Every Night. Gummies, Disp: , Rfl:     aspirin 81 MG EC tablet, Take 1 tablet by mouth Every Night., Disp: , Rfl:     carvedilol (COREG) 12.5 MG tablet, TAKE 1.5 TABLETS BY MOUTH TWO TIMES A DAY, Disp: 270 tablet, Rfl: 0    cetirizine (zyrTEC) 10 MG tablet, Take 1 tablet by mouth Daily., Disp: , Rfl:     chlorhexidine (PERIDEX) 0.12 % solution, , Disp: , Rfl:     Cholecalciferol (Vitamin D3) 25 MCG (1000 UT) chewable tablet, Chew 1,000 Units Every Night. Gummies, Disp: , Rfl:     clobetasol (TEMOVATE) 0.05 % cream, APPLY TO AFFECTED AREA(S) TWO TIMES A DAY (Patient taking differently: Apply 1 Application topically to the appropriate area as directed As Needed.), Disp: 45 g, Rfl: 3    Cranberry 500 MG chewable tablet, Chew 1,000 mg Every Night. Gummies, Disp: , Rfl:     estradiol (ESTRACE) 0.1 MG/GM vaginal cream, INSERT 2 GRAMS INTO THE VAGINA DAILY (Patient taking differently: Insert 2 g into the vagina 2 (Two) Times a Day As Needed.), Disp: 42.5 g, Rfl: 12    ezetimibe (ZETIA) 10 MG tablet, Take 1 tablet by mouth Every Night., Disp: 90 tablet, Rfl: 3    ferrous sulfate 325 (65 FE) MG tablet, Take 1 tablet by mouth Daily With Breakfast. (Patient taking differently: Take 1 tablet by mouth Every Night.), Disp: 90 tablet, Rfl: 3    Fluticasone Furoate-Vilanterol (Breo Ellipta) 200-25 MCG/ACT inhaler, Inhale 1 puff Daily., Disp: 60 each, Rfl: 11    furosemide (LASIX) 40 MG tablet, TAKE 1 TABLET BY MOUTH DAILY (Patient taking differently: Take 1 tablet by mouth Daily. Taking half tab 3-4 times per week.), Disp: 90 tablet, Rfl: 3    glimepiride (AMARYL) 2 MG tablet, TAKE ONE TABLET BY MOUTH TWICE A DAY (Patient taking differently: Taking half tab 3-4 times per week), Disp: 180 tablet, Rfl: 1    glucose blood test strip, Check glucose bid. Diagnosis: E11.9, Disp: 100 each, Rfl: 5    Januvia 50 MG tablet, TAKE ONE TABLET BY MOUTH DAILY, Disp: 90 tablet, Rfl: 3    losartan (COZAAR) 50 MG tablet, TAKE ONE  TABLET BY MOUTH TWICE A DAY (Patient taking differently: Take 1 tablet by mouth 2 (Two) Times a Day.), Disp: 180 tablet, Rfl: 2    metFORMIN (GLUCOPHAGE) 850 MG tablet, TAKE ONE TABLET BY MOUTH TWICE A DAY (Patient taking differently: Take 1 tablet by mouth 2 (Two) Times a Day With Meals.), Disp: 180 tablet, Rfl: 3    montelukast (SINGULAIR) 10 MG tablet, Take 1 tablet by mouth Every Morning., Disp: 30 tablet, Rfl: 5    multivitamin with minerals tablet tablet, Take 1 tablet by mouth Every Night. Gummies, Disp: , Rfl:     nitroglycerin (NITROSTAT) 0.4 MG SL tablet, 1 under the tongue as needed for angina, may repeat q5mins for up three doses, Disp: 100 tablet, Rfl: 11    oxyCODONE-acetaminophen (PERCOCET) 5-325 MG per tablet, , Disp: , Rfl:     pantoprazole (PROTONIX) 40 MG EC tablet, TAKE ONE TABLET BY MOUTH DAILY, Disp: 90 tablet, Rfl: 3    PARoxetine (PAXIL) 40 MG tablet, TAKE ONE TABLET BY MOUTH DAILY, Disp: 90 tablet, Rfl: 3    pravastatin (PRAVACHOL) 80 MG tablet, TAKE ONE TABLET BY MOUTH DAILY (Patient taking differently: Take 1 tablet by mouth Every Night.), Disp: 90 tablet, Rfl: 3    trimethoprim-polymyxin b (Polytrim) 67783-9.1 UNIT/ML-% ophthalmic solution, Administer 1 drop to the right eye Every 4 (Four) Hours., Disp: 1 each, Rfl: 1    Zinc 25 MG tablet, Take 25 mg by mouth Every Night. Gummies, Disp: , Rfl:     albuterol sulfate  (90 Base) MCG/ACT inhaler, Inhale 2 puffs Every 4 (Four) Hours As Needed for Wheezing., Disp: , Rfl:      Objective   Physical Exam  Constitutional:       General: She is not in acute distress.     Appearance: Normal appearance. She is overweight.   HENT:      Right Ear: Hearing normal.      Left Ear: Hearing normal.      Nose: No nasal tenderness or congestion.      Mouth/Throat:      Mouth: Mucous membranes are moist. No oral lesions.   Eyes:      Extraocular Movements: Extraocular movements intact.      Pupils: Pupils are equal, round, and reactive to light.   Neck:  "     Thyroid: No thyroid mass or thyromegaly.   Cardiovascular:      Rate and Rhythm: Normal rate and regular rhythm.      Pulses: Normal pulses.      Heart sounds: Normal heart sounds. No murmur heard.  Pulmonary:      Effort: Pulmonary effort is normal.      Breath sounds: Decreased breath sounds present. No wheezing, rhonchi or rales.   Musculoskeletal:      Cervical back: Neck supple.      Right lower leg: No edema.      Left lower leg: No edema.   Lymphadenopathy:      Cervical: No cervical adenopathy.      Upper Body:      Right upper body: No axillary adenopathy.   Skin:     General: Skin is warm and dry.      Findings: No lesion or rash.   Neurological:      General: No focal deficit present.      Mental Status: She is alert and oriented to person, place, and time.   Psychiatric:         Mood and Affect: Affect normal. Mood is not anxious or depressed.         Vital Signs:   /69 (BP Location: Right arm, Patient Position: Sitting, Cuff Size: Adult)   Pulse 71   Temp 98 °F (36.7 °C) (Oral)   Resp 18   Ht 152.4 cm (60\")   Wt 97.8 kg (215 lb 9.6 oz)   SpO2 93% Comment: RA; 3 L's PRN/HS  BMI 42.11 kg/m²        Result Review :   The following data was reviewed by: WALTER Lam on 04/11/2024:  CMP          10/23/2023    05:23 11/27/2023    09:15 3/5/2024    09:08   CMP   Glucose 179  168  150    BUN 38  47  36    Creatinine 1.19  1.36  1.05    EGFR 44.9  38.3  51.9    Sodium 139  141  140    Potassium 4.7  4.6  5.1    Chloride 104  97  101    Calcium 9.4  10.2  10.1    Total Protein   6.8    Albumin   4.1    Globulin   2.7    Total Bilirubin   0.4    Alkaline Phosphatase   55    AST (SGOT)   10    ALT (SGPT)   11    Albumin/Globulin Ratio   1.5    BUN/Creatinine Ratio 31.9  34.6  34.3    Anion Gap 9.4  15.0  12.1      CBC w/diff          8/31/2023    09:23 10/21/2023    10:03 3/5/2024    09:08   CBC w/Diff   WBC 12.46  9.78  9.44    RBC 3.80  3.70  3.69    Hemoglobin 11.2  10.6  10.9  "   Hematocrit 34.7  35.5  34.2    MCV 91.3  95.9  92.7    MCH 29.5  28.6  29.5    MCHC 32.3  29.9  31.9    RDW 13.2  16.3  13.9    Platelets 285  226  248    Neutrophil Rel % 78.1  81.3  77.2    Immature Granulocyte Rel % 0.5  0.3  0.1    Lymphocyte Rel % 12.3  9.2  13.6    Monocyte Rel % 6.9  5.9  7.3    Eosinophil Rel % 2.0  3.0  1.5    Basophil Rel % 0.2  0.3  0.3      Data reviewed : Radiologic studies chest CT 7/15/2019, chest x-ray 10/22/2023, pulmonary function test 5/16/2019 and my last office note    Procedures        Assessment and Plan    Diagnoses and all orders for this visit:    1. Moderate persistent asthma without complication (Primary)  Comments:  Continue Breo    2. Chronic bronchitis, unspecified chronic bronchitis type  Comments:  Continue Breo    3. Seasonal allergies  Comments:  Continue Zyrtec and Singulair    4. Dyspnea on exertion  Comments:  Continue albuterol and oxygen    5. Chronic respiratory failure with hypoxia  Comments:  Continue oxygen    6. Obesity, Class III, BMI 40-49.9 (morbid obesity)  Comments:  Encourage patient try stay as active as possible.  Recommend 30 minutes with activity.            Follow Up   Return in about 4 months (around 8/17/2024) for Recheck with Harleen.  Patient was given instructions and counseling regarding her condition or for health maintenance advice. Please see specific information pulled into the AVS if appropriate.

## 2024-04-29 RX ORDER — CARVEDILOL 12.5 MG/1
TABLET ORAL
Qty: 270 TABLET | Refills: 1 | Status: SHIPPED | OUTPATIENT
Start: 2024-04-29

## 2024-05-07 ENCOUNTER — OFFICE VISIT (OUTPATIENT)
Dept: CARDIOLOGY | Facility: CLINIC | Age: 89
End: 2024-05-07
Payer: MEDICARE

## 2024-05-07 VITALS
HEART RATE: 73 BPM | WEIGHT: 216 LBS | BODY MASS INDEX: 42.41 KG/M2 | SYSTOLIC BLOOD PRESSURE: 149 MMHG | DIASTOLIC BLOOD PRESSURE: 68 MMHG | HEIGHT: 60 IN

## 2024-05-07 DIAGNOSIS — E78.00 HYPERCHOLESTEROLEMIA: ICD-10-CM

## 2024-05-07 DIAGNOSIS — I10 ESSENTIAL HYPERTENSION: Primary | Chronic | ICD-10-CM

## 2024-05-07 DIAGNOSIS — I25.10 ATHEROSCLEROSIS OF NATIVE CORONARY ARTERY OF NATIVE HEART WITHOUT ANGINA PECTORIS: ICD-10-CM

## 2024-05-07 DIAGNOSIS — R60.0 EDEMA LEG: ICD-10-CM

## 2024-05-07 PROCEDURE — 99214 OFFICE O/P EST MOD 30 MIN: CPT | Performed by: INTERNAL MEDICINE

## 2024-06-03 RX ORDER — LOSARTAN POTASSIUM 50 MG/1
50 TABLET ORAL 2 TIMES DAILY
Qty: 180 TABLET | Refills: 2 | Status: SHIPPED | OUTPATIENT
Start: 2024-06-03

## 2024-07-01 RX ORDER — AMLODIPINE BESYLATE 5 MG/1
5 TABLET ORAL DAILY
Qty: 90 TABLET | Refills: 3 | Status: SHIPPED | OUTPATIENT
Start: 2024-07-01

## 2024-07-11 RX ORDER — MONTELUKAST SODIUM 10 MG/1
10 TABLET ORAL EVERY MORNING
Qty: 90 TABLET | Refills: 1 | Status: SHIPPED | OUTPATIENT
Start: 2024-07-11

## 2024-07-29 DIAGNOSIS — E78.00 PURE HYPERCHOLESTEROLEMIA: Chronic | ICD-10-CM

## 2024-07-29 RX ORDER — PRAVASTATIN SODIUM 80 MG/1
80 TABLET ORAL DAILY
Qty: 90 TABLET | Refills: 3 | Status: SHIPPED | OUTPATIENT
Start: 2024-07-29

## 2024-08-21 ENCOUNTER — OFFICE VISIT (OUTPATIENT)
Dept: PULMONOLOGY | Facility: CLINIC | Age: 89
End: 2024-08-21
Payer: MEDICARE

## 2024-08-21 VITALS
DIASTOLIC BLOOD PRESSURE: 41 MMHG | HEART RATE: 59 BPM | HEIGHT: 60 IN | TEMPERATURE: 97.9 F | BODY MASS INDEX: 41.54 KG/M2 | RESPIRATION RATE: 18 BRPM | OXYGEN SATURATION: 91 % | SYSTOLIC BLOOD PRESSURE: 134 MMHG | WEIGHT: 211.6 LBS

## 2024-08-21 DIAGNOSIS — J45.40 MODERATE PERSISTENT ASTHMA WITHOUT COMPLICATION: Chronic | ICD-10-CM

## 2024-08-21 DIAGNOSIS — J30.2 SEASONAL ALLERGIES: ICD-10-CM

## 2024-08-21 DIAGNOSIS — R06.09 DYSPNEA ON EXERTION: ICD-10-CM

## 2024-08-21 DIAGNOSIS — J42 CHRONIC BRONCHITIS, UNSPECIFIED CHRONIC BRONCHITIS TYPE: Primary | Chronic | ICD-10-CM

## 2024-08-21 DIAGNOSIS — J96.11 CHRONIC RESPIRATORY FAILURE WITH HYPOXIA: ICD-10-CM

## 2024-08-21 PROCEDURE — 1160F RVW MEDS BY RX/DR IN RCRD: CPT | Performed by: NURSE PRACTITIONER

## 2024-08-21 PROCEDURE — 99214 OFFICE O/P EST MOD 30 MIN: CPT | Performed by: NURSE PRACTITIONER

## 2024-08-21 PROCEDURE — 1159F MED LIST DOCD IN RCRD: CPT | Performed by: NURSE PRACTITIONER

## 2024-08-21 RX ORDER — FLUCONAZOLE 150 MG/1
TABLET ORAL
COMMUNITY
Start: 2024-05-01

## 2024-08-21 RX ORDER — FLUTICASONE FUROATE AND VILANTEROL 200; 25 UG/1; UG/1
1 POWDER RESPIRATORY (INHALATION)
Qty: 60 EACH | Refills: 11 | Status: SHIPPED | OUTPATIENT
Start: 2024-08-21

## 2024-08-21 NOTE — PROGRESS NOTES
Primary Care Provider  Jacobo Redmond MD     Referring Provider  No ref. provider found     Chief Complaint  Allergic Rhinitis, Asthma, and Follow-up (4 month)    Subjective          Devi Diallo presents to [facility] PULMONARY & CRITICAL CARE MEDICINE  History of Present Illness  Devi Diallo is a 88 y.o. female patient of Dr. Timmons here for management of chronic bronchitis, moderate persistent asthma, seasonal allergies and dyspnea on exertion.      Patient states she is doing well since her last office visit.  She denies using any antibiotics or steroids for her lungs.  She denies any current fevers or chills.  Patient states that her shortness of breath is mild in severity, worse with exertion and improved with rest. She continues to use Breo as prescribed.  She will intermittently use her albuterol if needed.  She continues to take Zyrtec and Singulair for allergies.  She wears 4 L of oxygen pulsed dose during the day as needed and continuous at night.  She is using and benefiting from her oxygen.  Overall, she is doing well and has no additional concerns at this time.  She is able to perform her ADLs without difficulty.  She is up-to-date with her pneumonia vaccine.  She would like to discuss RSV vaccine today in office and will also discuss it with her primary care provider next month.     Her history of smoking is   Tobacco Use: Low Risk  (8/21/2024)    Patient History     Smoking Tobacco Use: Never     Smokeless Tobacco Use: Never     Passive Exposure: Never   .    Review of Systems   Constitutional:  Negative for chills, fatigue, fever, unexpected weight gain and unexpected weight loss.   HENT:  Congestion: Nasal.    Respiratory:  Positive for shortness of breath. Negative for apnea, cough and wheezing.         Negative for Hemoptysis     Cardiovascular:  Negative for chest pain, palpitations and leg swelling.   Skin:         Negative for cyanosis      Sleep: Negative  for Excessive daytime sleepiness  Negative for morning headaches  Negative for Snoring    Family History   Problem Relation Age of Onset    Cancer Mother         Unspecified    Arthritis Mother     Diabetes Brother         Unspecified type    Arthritis Brother         Social History     Socioeconomic History    Marital status:    Tobacco Use    Smoking status: Never     Passive exposure: Never    Smokeless tobacco: Never   Vaping Use    Vaping status: Never Used   Substance and Sexual Activity    Alcohol use: Never    Drug use: Never    Sexual activity: Defer        Past Medical History:   Diagnosis Date    Arthritis     Atherosclerotic heart disease of native coronary artery without angina pectoris 1/1/2002    cardiac catheterization 2002    Diabetes     Essential hypertension 9/20/2012    Hyperlipemia     Hypertension     Pure hypercholesterolemia 4/25/2014    Reflux esophagitis     Seasonal allergies     Stage 2 chronic kidney disease 9/20/2012        Immunization History   Administered Date(s) Administered    COVID-19 (MODERNA) 1st,2nd,3rd Dose Monovalent 02/18/2021, 03/19/2021    Fluzone High-Dose 65+YRS 10/16/2020    Fluzone High-Dose 65+yrs 10/08/2021, 10/05/2022, 10/17/2023    Pneumococcal Conjugate 13-Valent (PCV13) 09/01/2016    Pneumococcal Polysaccharide (PPSV23) 09/01/2019    Pneumococcal, Unspecified 10/20/2019, 10/20/2019         Allergies   Allergen Reactions    Amoxicillin-Pot Clavulanate Hives and Unknown - Low Severity    Cipro [Ciprofloxacin Hcl] Hives    Ciprofloxacin Unknown - Low Severity    Clindamycin Unknown - Low Severity    Clindamycin/Lincomycin Rash    Macrobid [Nitrofurantoin] Hives    Penicillins Hives    Sulfamethoxazole-Trimethoprim Nausea Only and Unknown - Low Severity          Current Outpatient Medications:     albuterol sulfate  (90 Base) MCG/ACT inhaler, Inhale 2 puffs Every 4 (Four) Hours As Needed for Wheezing., Disp: , Rfl:     amLODIPine (NORVASC) 5 MG  tablet, TAKE 1 TABLET BY MOUTH DAILY, Disp: 90 tablet, Rfl: 3    Ascorbic Acid (Vitamin C) 500 MG chewable tablet, Chew 500 mg Every Night. Gummies, Disp: , Rfl:     aspirin 81 MG EC tablet, Take 1 tablet by mouth Every Night., Disp: , Rfl:     carvedilol (COREG) 12.5 MG tablet, TAKE 1 AND 1/2 TABLET BY MOUTH TWO TIMES A DAY, Disp: 270 tablet, Rfl: 1    cetirizine (zyrTEC) 10 MG tablet, Take 1 tablet by mouth Daily., Disp: , Rfl:     Cholecalciferol (Vitamin D3) 25 MCG (1000 UT) chewable tablet, Chew 1,000 Units Every Night. Gummies, Disp: , Rfl:     clobetasol (TEMOVATE) 0.05 % cream, APPLY TO AFFECTED AREA(S) TWO TIMES A DAY (Patient taking differently: Apply 1 Application topically to the appropriate area as directed As Needed.), Disp: 45 g, Rfl: 3    Cranberry 500 MG chewable tablet, Chew 1,000 mg Every Night. Gummies, Disp: , Rfl:     estradiol (ESTRACE) 0.1 MG/GM vaginal cream, INSERT 2 GRAMS INTO THE VAGINA DAILY (Patient taking differently: Insert 2 g into the vagina 2 (Two) Times a Day As Needed.), Disp: 42.5 g, Rfl: 12    ezetimibe (ZETIA) 10 MG tablet, Take 1 tablet by mouth Every Night., Disp: 90 tablet, Rfl: 3    ferrous sulfate 325 (65 FE) MG tablet, Take 1 tablet by mouth Daily With Breakfast. (Patient taking differently: Take 1 tablet by mouth Every Night.), Disp: 90 tablet, Rfl: 3    Fluticasone Furoate-Vilanterol (Breo Ellipta) 200-25 MCG/ACT inhaler, Inhale 1 puff Daily., Disp: 60 each, Rfl: 11    glucose blood test strip, Check glucose bid. Diagnosis: E11.9, Disp: 100 each, Rfl: 5    Januvia 50 MG tablet, TAKE ONE TABLET BY MOUTH DAILY, Disp: 90 tablet, Rfl: 3    losartan (COZAAR) 50 MG tablet, TAKE 1 TABLET BY MOUTH TWICE A DAY, Disp: 180 tablet, Rfl: 2    metFORMIN (GLUCOPHAGE) 850 MG tablet, TAKE 1 TABLET BY MOUTH TWICE A DAY, Disp: 180 tablet, Rfl: 3    montelukast (SINGULAIR) 10 MG tablet, TAKE 1 TABLET BY MOUTH EVERY MORNING, Disp: 90 tablet, Rfl: 1    multivitamin with minerals tablet  tablet, Take 1 tablet by mouth Every Night. Gummies, Disp: , Rfl:     nitroglycerin (NITROSTAT) 0.4 MG SL tablet, 1 under the tongue as needed for angina, may repeat q5mins for up three doses, Disp: 100 tablet, Rfl: 11    pantoprazole (PROTONIX) 40 MG EC tablet, TAKE ONE TABLET BY MOUTH DAILY, Disp: 90 tablet, Rfl: 3    PARoxetine (PAXIL) 40 MG tablet, TAKE ONE TABLET BY MOUTH DAILY, Disp: 90 tablet, Rfl: 3    pravastatin (PRAVACHOL) 80 MG tablet, TAKE 1 TABLET BY MOUTH DAILY, Disp: 90 tablet, Rfl: 3    trimethoprim-polymyxin b (Polytrim) 36217-6.1 UNIT/ML-% ophthalmic solution, Administer 1 drop to the right eye Every 4 (Four) Hours., Disp: 1 each, Rfl: 1    Zinc 25 MG tablet, Take 25 mg by mouth Every Night. Gummies, Disp: , Rfl:     chlorhexidine (PERIDEX) 0.12 % solution, , Disp: , Rfl:     fluconazole (DIFLUCAN) 150 MG tablet, , Disp: , Rfl:     furosemide (LASIX) 40 MG tablet, TAKE 1 TABLET BY MOUTH DAILY (Patient not taking: Reported on 8/21/2024), Disp: 90 tablet, Rfl: 3    glimepiride (AMARYL) 2 MG tablet, TAKE ONE TABLET BY MOUTH TWICE A DAY (Patient not taking: Reported on 8/21/2024), Disp: 180 tablet, Rfl: 1    oxyCODONE-acetaminophen (PERCOCET) 5-325 MG per tablet, , Disp: , Rfl:      Objective   Physical Exam  Constitutional:       General: She is not in acute distress.     Appearance: Normal appearance. She is overweight.   HENT:      Right Ear: Hearing normal.      Left Ear: Hearing normal.      Nose: No nasal tenderness or congestion.      Mouth/Throat:      Mouth: Mucous membranes are moist. No oral lesions.   Eyes:      Extraocular Movements: Extraocular movements intact.      Pupils: Pupils are equal, round, and reactive to light.   Cardiovascular:      Rate and Rhythm: Normal rate and regular rhythm.      Pulses: Normal pulses.      Heart sounds: Normal heart sounds. No murmur heard.  Pulmonary:      Effort: Pulmonary effort is normal.      Breath sounds: Normal breath sounds. No wheezing,  "rhonchi or rales.   Musculoskeletal:      Right lower leg: No edema.      Left lower leg: No edema.   Skin:     General: Skin is warm and dry.      Findings: No lesion or rash.   Neurological:      General: No focal deficit present.      Mental Status: She is alert and oriented to person, place, and time.   Psychiatric:         Mood and Affect: Affect normal. Mood is not anxious or depressed.         Vital Signs:   /41 (BP Location: Left arm, Patient Position: Sitting, Cuff Size: Adult)   Pulse 59   Temp 97.9 °F (36.6 °C) (Oral)   Resp 18   Ht 152.4 cm (60\")   Wt 96 kg (211 lb 9.6 oz)   SpO2 91% Comment: room air/ HS oxygen  BMI 41.33 kg/m²        Result Review :   The following data was reviewed by: WALTER Lam on 08/21/2024:  CMP          10/23/2023    05:23 11/27/2023    09:15 3/5/2024    09:08   CMP   Glucose 179  168  150    BUN 38  47  36    Creatinine 1.19  1.36  1.05    EGFR 44.9  38.3  51.9    Sodium 139  141  140    Potassium 4.7  4.6  5.1    Chloride 104  97  101    Calcium 9.4  10.2  10.1    Total Protein   6.8    Albumin   4.1    Globulin   2.7    Total Bilirubin   0.4    Alkaline Phosphatase   55    AST (SGOT)   10    ALT (SGPT)   11    Albumin/Globulin Ratio   1.5    BUN/Creatinine Ratio 31.9  34.6  34.3    Anion Gap 9.4  15.0  12.1      CBC w/diff          8/31/2023    09:23 10/21/2023    10:03 3/5/2024    09:08   CBC w/Diff   WBC 12.46  9.78  9.44    RBC 3.80  3.70  3.69    Hemoglobin 11.2  10.6  10.9    Hematocrit 34.7  35.5  34.2    MCV 91.3  95.9  92.7    MCH 29.5  28.6  29.5    MCHC 32.3  29.9  31.9    RDW 13.2  16.3  13.9    Platelets 285  226  248    Neutrophil Rel % 78.1  81.3  77.2    Immature Granulocyte Rel % 0.5  0.3  0.1    Lymphocyte Rel % 12.3  9.2  13.6    Monocyte Rel % 6.9  5.9  7.3    Eosinophil Rel % 2.0  3.0  1.5    Basophil Rel % 0.2  0.3  0.3      Data reviewed : Radiologic studies chest xray 10/22/2023 and my last office note    Procedures      "   Assessment and Plan    Diagnoses and all orders for this visit:    1. Chronic bronchitis, unspecified chronic bronchitis type (Primary)  Comments:  Continue Breo  Orders:  -     Fluticasone Furoate-Vilanterol (Breo Ellipta) 200-25 MCG/ACT inhaler; Inhale 1 puff Daily.  Dispense: 60 each; Refill: 11    2. Moderate persistent asthma without complication  Comments:  Continue Breo  Orders:  -     Fluticasone Furoate-Vilanterol (Breo Ellipta) 200-25 MCG/ACT inhaler; Inhale 1 puff Daily.  Dispense: 60 each; Refill: 11    3. Seasonal allergies  Comments:  Continue Zyrtec daily    4. Dyspnea on exertion  Comments:  Continue albuterol    5. Chronic respiratory failure with hypoxia  Comments:  Continue oxygen          Follow Up   Return in about 6 months (around 2/21/2025) for Recheck.  Patient was given instructions and counseling regarding her condition or for health maintenance advice. Please see specific information pulled into the AVS if appropriate.

## 2024-09-09 ENCOUNTER — LAB (OUTPATIENT)
Dept: LAB | Facility: HOSPITAL | Age: 89
End: 2024-09-09
Payer: MEDICARE

## 2024-09-09 DIAGNOSIS — R06.09 DYSPNEA ON EXERTION: ICD-10-CM

## 2024-09-09 DIAGNOSIS — E11.9 TYPE 2 DIABETES MELLITUS WITHOUT COMPLICATION, WITHOUT LONG-TERM CURRENT USE OF INSULIN: ICD-10-CM

## 2024-09-09 DIAGNOSIS — N39.41 URGE INCONTINENCE OF URINE: ICD-10-CM

## 2024-09-09 DIAGNOSIS — E66.01 CLASS 3 SEVERE OBESITY DUE TO EXCESS CALORIES WITH SERIOUS COMORBIDITY AND BODY MASS INDEX (BMI) OF 40.0 TO 44.9 IN ADULT: ICD-10-CM

## 2024-09-09 DIAGNOSIS — F41.9 ANXIETY: ICD-10-CM

## 2024-09-09 DIAGNOSIS — D50.9 IRON DEFICIENCY ANEMIA, UNSPECIFIED IRON DEFICIENCY ANEMIA TYPE: ICD-10-CM

## 2024-09-09 DIAGNOSIS — J45.41 MODERATE PERSISTENT ASTHMA WITH ACUTE EXACERBATION: ICD-10-CM

## 2024-09-09 DIAGNOSIS — N18.31 STAGE 3A CHRONIC KIDNEY DISEASE: ICD-10-CM

## 2024-09-09 DIAGNOSIS — I10 ESSENTIAL HYPERTENSION: ICD-10-CM

## 2024-09-09 DIAGNOSIS — K21.9 GASTRO-ESOPHAGEAL REFLUX DISEASE WITHOUT ESOPHAGITIS: ICD-10-CM

## 2024-09-09 LAB
ALBUMIN SERPL-MCNC: 4.1 G/DL (ref 3.5–5.2)
ALBUMIN UR-MCNC: 4.3 MG/DL
ALBUMIN/GLOB SERPL: 1.5 G/DL
ALP SERPL-CCNC: 69 U/L (ref 39–117)
ALT SERPL W P-5'-P-CCNC: 11 U/L (ref 1–33)
ANION GAP SERPL CALCULATED.3IONS-SCNC: 10 MMOL/L (ref 5–15)
AST SERPL-CCNC: 12 U/L (ref 1–32)
BASOPHILS # BLD AUTO: 0.03 10*3/MM3 (ref 0–0.2)
BASOPHILS NFR BLD AUTO: 0.3 % (ref 0–1.5)
BILIRUB SERPL-MCNC: 0.3 MG/DL (ref 0–1.2)
BUN SERPL-MCNC: 33 MG/DL (ref 8–23)
BUN/CREAT SERPL: 28.7 (ref 7–25)
CALCIUM SPEC-SCNC: 10.1 MG/DL (ref 8.6–10.5)
CHLORIDE SERPL-SCNC: 103 MMOL/L (ref 98–107)
CHOLEST SERPL-MCNC: 124 MG/DL (ref 0–200)
CO2 SERPL-SCNC: 26 MMOL/L (ref 22–29)
CREAT SERPL-MCNC: 1.15 MG/DL (ref 0.57–1)
DEPRECATED RDW RBC AUTO: 47.7 FL (ref 37–54)
EGFRCR SERPLBLD CKD-EPI 2021: 46.5 ML/MIN/1.73
EOSINOPHIL # BLD AUTO: 0.23 10*3/MM3 (ref 0–0.4)
EOSINOPHIL NFR BLD AUTO: 2.2 % (ref 0.3–6.2)
ERYTHROCYTE [DISTWIDTH] IN BLOOD BY AUTOMATED COUNT: 14.4 % (ref 12.3–15.4)
GLOBULIN UR ELPH-MCNC: 2.8 GM/DL
GLUCOSE SERPL-MCNC: 138 MG/DL (ref 65–99)
HBA1C MFR BLD: 6.6 % (ref 4.8–5.6)
HCT VFR BLD AUTO: 36.2 % (ref 34–46.6)
HDLC SERPL-MCNC: 50 MG/DL (ref 40–60)
HGB BLD-MCNC: 11.5 G/DL (ref 12–15.9)
IMM GRANULOCYTES # BLD AUTO: 0.04 10*3/MM3 (ref 0–0.05)
IMM GRANULOCYTES NFR BLD AUTO: 0.4 % (ref 0–0.5)
LDLC SERPL CALC-MCNC: 55 MG/DL (ref 0–100)
LDLC/HDLC SERPL: 1.07 {RATIO}
LYMPHOCYTES # BLD AUTO: 1.35 10*3/MM3 (ref 0.7–3.1)
LYMPHOCYTES NFR BLD AUTO: 12.9 % (ref 19.6–45.3)
MCH RBC QN AUTO: 29.3 PG (ref 26.6–33)
MCHC RBC AUTO-ENTMCNC: 31.8 G/DL (ref 31.5–35.7)
MCV RBC AUTO: 92.3 FL (ref 79–97)
MONOCYTES # BLD AUTO: 0.72 10*3/MM3 (ref 0.1–0.9)
MONOCYTES NFR BLD AUTO: 6.9 % (ref 5–12)
NEUTROPHILS NFR BLD AUTO: 77.3 % (ref 42.7–76)
NEUTROPHILS NFR BLD AUTO: 8.11 10*3/MM3 (ref 1.7–7)
NRBC BLD AUTO-RTO: 0 /100 WBC (ref 0–0.2)
NT-PROBNP SERPL-MCNC: 282 PG/ML (ref 0–1800)
PLATELET # BLD AUTO: 248 10*3/MM3 (ref 140–450)
PMV BLD AUTO: 9.7 FL (ref 6–12)
POTASSIUM SERPL-SCNC: 5.2 MMOL/L (ref 3.5–5.2)
PROT SERPL-MCNC: 6.9 G/DL (ref 6–8.5)
RBC # BLD AUTO: 3.92 10*6/MM3 (ref 3.77–5.28)
SODIUM SERPL-SCNC: 139 MMOL/L (ref 136–145)
T4 FREE SERPL-MCNC: 1.32 NG/DL (ref 0.92–1.68)
TRIGL SERPL-MCNC: 103 MG/DL (ref 0–150)
TSH SERPL DL<=0.05 MIU/L-ACNC: 1.32 UIU/ML (ref 0.27–4.2)
VLDLC SERPL-MCNC: 19 MG/DL (ref 5–40)
WBC NRBC COR # BLD AUTO: 10.48 10*3/MM3 (ref 3.4–10.8)

## 2024-09-09 PROCEDURE — 84443 ASSAY THYROID STIM HORMONE: CPT

## 2024-09-09 PROCEDURE — 80053 COMPREHEN METABOLIC PANEL: CPT

## 2024-09-09 PROCEDURE — 83036 HEMOGLOBIN GLYCOSYLATED A1C: CPT

## 2024-09-09 PROCEDURE — 84439 ASSAY OF FREE THYROXINE: CPT

## 2024-09-09 PROCEDURE — 82043 UR ALBUMIN QUANTITATIVE: CPT

## 2024-09-09 PROCEDURE — 80061 LIPID PANEL: CPT

## 2024-09-09 PROCEDURE — 36415 COLL VENOUS BLD VENIPUNCTURE: CPT

## 2024-09-09 PROCEDURE — 85025 COMPLETE CBC W/AUTO DIFF WBC: CPT

## 2024-09-09 PROCEDURE — 83880 ASSAY OF NATRIURETIC PEPTIDE: CPT

## 2024-09-12 ENCOUNTER — OFFICE VISIT (OUTPATIENT)
Dept: INTERNAL MEDICINE | Age: 89
End: 2024-09-12
Payer: MEDICARE

## 2024-09-12 ENCOUNTER — TELEPHONE (OUTPATIENT)
Dept: INTERNAL MEDICINE | Age: 89
End: 2024-09-12

## 2024-09-12 VITALS
HEART RATE: 76 BPM | DIASTOLIC BLOOD PRESSURE: 45 MMHG | OXYGEN SATURATION: 91 % | WEIGHT: 205 LBS | HEIGHT: 60 IN | TEMPERATURE: 98.4 F | SYSTOLIC BLOOD PRESSURE: 133 MMHG | BODY MASS INDEX: 40.25 KG/M2

## 2024-09-12 DIAGNOSIS — N39.41 URGE INCONTINENCE OF URINE: ICD-10-CM

## 2024-09-12 DIAGNOSIS — F41.9 ANXIETY: ICD-10-CM

## 2024-09-12 DIAGNOSIS — Z00.00 MEDICARE ANNUAL WELLNESS VISIT, SUBSEQUENT: Primary | ICD-10-CM

## 2024-09-12 DIAGNOSIS — I34.0 NONRHEUMATIC MITRAL VALVE REGURGITATION: ICD-10-CM

## 2024-09-12 DIAGNOSIS — D50.9 IRON DEFICIENCY ANEMIA, UNSPECIFIED IRON DEFICIENCY ANEMIA TYPE: ICD-10-CM

## 2024-09-12 DIAGNOSIS — E66.01 CLASS 3 SEVERE OBESITY DUE TO EXCESS CALORIES WITH SERIOUS COMORBIDITY AND BODY MASS INDEX (BMI) OF 40.0 TO 44.9 IN ADULT: ICD-10-CM

## 2024-09-12 DIAGNOSIS — E78.2 MIXED HYPERLIPIDEMIA: ICD-10-CM

## 2024-09-12 DIAGNOSIS — K21.9 GASTRO-ESOPHAGEAL REFLUX DISEASE WITHOUT ESOPHAGITIS: ICD-10-CM

## 2024-09-12 DIAGNOSIS — I10 ESSENTIAL HYPERTENSION: Chronic | ICD-10-CM

## 2024-09-12 DIAGNOSIS — E11.9 TYPE 2 DIABETES MELLITUS WITHOUT COMPLICATION, WITHOUT LONG-TERM CURRENT USE OF INSULIN: ICD-10-CM

## 2024-09-12 RX ORDER — PRAMOXINE HYDROCHLORIDE 10 MG/G
AEROSOL, FOAM TOPICAL 2 TIMES DAILY PRN
Qty: 1 EACH | Refills: 5 | Status: SHIPPED | OUTPATIENT
Start: 2024-09-12

## 2024-09-12 NOTE — PROGRESS NOTES
Subjective   The ABCs of the Annual Wellness Visit  Medicare Wellness Visit      Devi Diallo is a 88 y.o. patient who presents for a Medicare Wellness Visit.    The following portions of the patient's history were reviewed and   updated as appropriate: allergies, current medications, past family history, past medical history, past social history, past surgical history, and problem list.    Compared to one year ago, the patient's physical   health is the same.  Compared to one year ago, the patient's mental   health is the same.    Recent Hospitalizations:  This patient has had a [facility] admission record on file within the last 365 days.  Current Medical Providers:  Patient Care Team:  Jacobo Redmond MD as PCP - General (Internal Medicine)  Teena Knapp MD as Consulting Physician (Obstetrics and Gynecology)  Harleen Dasilva APRN as Nurse Practitioner (Pulmonary Disease)    Outpatient Medications Prior to Visit   Medication Sig Dispense Refill    albuterol sulfate  (90 Base) MCG/ACT inhaler Inhale 2 puffs Every 4 (Four) Hours As Needed for Wheezing.      amLODIPine (NORVASC) 5 MG tablet TAKE 1 TABLET BY MOUTH DAILY 90 tablet 3    Ascorbic Acid (Vitamin C) 500 MG chewable tablet Chew 500 mg Every Night. Gummies      aspirin 81 MG EC tablet Take 1 tablet by mouth Every Night.      carvedilol (COREG) 12.5 MG tablet TAKE 1 AND 1/2 TABLET BY MOUTH TWO TIMES A  tablet 1    cetirizine (zyrTEC) 10 MG tablet Take 1 tablet by mouth Daily.      Cholecalciferol (Vitamin D3) 25 MCG (1000 UT) chewable tablet Chew 1,000 Units Every Night. Gummies      clobetasol (TEMOVATE) 0.05 % cream APPLY TO AFFECTED AREA(S) TWO TIMES A DAY (Patient taking differently: Apply 1 Application topically to the appropriate area as directed As Needed.) 45 g 3    Cranberry 500 MG chewable tablet Chew 1,000 mg Every Night. Gummies      estradiol (ESTRACE) 0.1 MG/GM vaginal cream INSERT 2 GRAMS INTO THE VAGINA  DAILY (Patient taking differently: Insert 2 g into the vagina 2 (Two) Times a Day As Needed.) 42.5 g 12    ezetimibe (ZETIA) 10 MG tablet Take 1 tablet by mouth Every Night. 90 tablet 3    ferrous sulfate 325 (65 FE) MG tablet Take 1 tablet by mouth Daily With Breakfast. (Patient taking differently: Take 1 tablet by mouth Every Night.) 90 tablet 3    Fluticasone Furoate-Vilanterol (Breo Ellipta) 200-25 MCG/ACT inhaler Inhale 1 puff Daily. 60 each 11    glucose blood test strip Check glucose bid. Diagnosis: E11.9 100 each 5    Januvia 50 MG tablet TAKE ONE TABLET BY MOUTH DAILY 90 tablet 3    losartan (COZAAR) 50 MG tablet TAKE 1 TABLET BY MOUTH TWICE A  tablet 2    metFORMIN (GLUCOPHAGE) 850 MG tablet TAKE 1 TABLET BY MOUTH TWICE A  tablet 3    montelukast (SINGULAIR) 10 MG tablet TAKE 1 TABLET BY MOUTH EVERY MORNING 90 tablet 1    multivitamin with minerals tablet tablet Take 1 tablet by mouth Every Night. Gummies      nitroglycerin (NITROSTAT) 0.4 MG SL tablet 1 under the tongue as needed for angina, may repeat q5mins for up three doses 100 tablet 11    pantoprazole (PROTONIX) 40 MG EC tablet TAKE ONE TABLET BY MOUTH DAILY 90 tablet 3    PARoxetine (PAXIL) 40 MG tablet TAKE ONE TABLET BY MOUTH DAILY 90 tablet 3    pravastatin (PRAVACHOL) 80 MG tablet TAKE 1 TABLET BY MOUTH DAILY 90 tablet 3    trimethoprim-polymyxin b (Polytrim) 54782-3.1 UNIT/ML-% ophthalmic solution Administer 1 drop to the right eye Every 4 (Four) Hours. 1 each 1    Zinc 25 MG tablet Take 25 mg by mouth Every Night. Gummies      chlorhexidine (PERIDEX) 0.12 % solution  (Patient not taking: Reported on 8/21/2024)      fluconazole (DIFLUCAN) 150 MG tablet  (Patient not taking: Reported on 8/21/2024)      furosemide (LASIX) 40 MG tablet TAKE 1 TABLET BY MOUTH DAILY (Patient not taking: Reported on 8/21/2024) 90 tablet 3    glimepiride (AMARYL) 2 MG tablet TAKE ONE TABLET BY MOUTH TWICE A DAY (Patient not taking: Reported on  8/21/2024) 180 tablet 1    oxyCODONE-acetaminophen (PERCOCET) 5-325 MG per tablet  (Patient not taking: Reported on 8/21/2024)       No facility-administered medications prior to visit.     Opioid medication/s are on active medication list.  and I have evaluated her active treatment plan and pain score trends (see table).  There were no vitals filed for this visit.  I have reviewed the chart for potential of high risk medication and harmful drug interactions in the elderly.        Aspirin is on active medication list. Aspirin use is indicated based on review of current medical condition/s. Pros and cons of this therapy have been discussed today. Benefits of this medication outweigh potential harm.  Patient has been encouraged to continue taking this medication.  .      Patient Active Problem List   Diagnosis    Atherosclerosis of native coronary artery of native heart without angina pectoris    Essential hypertension    Hypercholesterolemia    Stage 2 chronic kidney disease    Iron deficiency anemia    Class 3 severe obesity due to excess calories with serious comorbidity and body mass index (BMI) of 40.0 to 44.9 in adult    Diabetes 1.5, managed as type 2    Nausea    Acute cystitis without hematuria    Vaginal yeast infection    White blood cell disorder    Urge incontinence of urine    Type 2 diabetes mellitus without complication    Seasonal allergic rhinitis    Heart failure    Gastro-esophageal reflux disease without esophagitis    Dizziness and giddiness    Diabetes    Arthritis    Nonrheumatic mitral valve regurgitation    Stage 3b chronic kidney disease    Stage 3a chronic kidney disease    Hemorrhoids    Medicare annual wellness visit, subsequent    Hospital discharge follow-up    Moderate persistent asthma with acute exacerbation    Anxiety    Mixed hyperlipidemia     Advance Care Planning Advance Directive is not on file.  ACP discussion was held with the patient during this visit. Patient has an advance  "directive (not in EMR), copy requested.            Objective   Vitals:    24 0948   BP: 133/45   Pulse: 76   Temp: 98.4 °F (36.9 °C)   SpO2: 91%   Weight: 93 kg (205 lb)   Height: 152.4 cm (60\")       Estimated body mass index is 40.04 kg/m² as calculated from the following:    Height as of this encounter: 152.4 cm (60\").    Weight as of this encounter: 93 kg (205 lb).    Class 3 Severe Obesity (BMI >=40). Obesity-related health conditions include the following: hypertension. Obesity is unchanged. BMI is is above average; BMI management plan is completed. We discussed portion control and increasing exercise.       Does the patient have evidence of cognitive impairment? No  Lab Results   Component Value Date    TRIG 103 2024    HDL 50 2024    LDL 55 2024    VLDL 19 2024    HGBA1C 6.60 (H) 2024                                                                                               Health  Risk Assessment    Smoking Status:  Social History     Tobacco Use   Smoking Status Never    Passive exposure: Never   Smokeless Tobacco Never     Alcohol Consumption:  Social History     Substance and Sexual Activity   Alcohol Use Never       Fall Risk Screen  STEADI Fall Risk Assessment was completed, and patient is at LOW risk for falls.Assessment completed on:2024    Depression Screenin/12/2024     9:57 AM   PHQ-2/PHQ-9 Depression Screening   Little Interest or Pleasure in Doing Things 0-->not at all   Feeling Down, Depressed or Hopeless 0-->not at all   PHQ-9: Brief Depression Severity Measure Score 0     Health Habits and Functional and Cognitive Screenin/12/2024     9:00 AM   Functional & Cognitive Status   Do you have difficulty preparing food and eating? No   Do you have difficulty bathing yourself, getting dressed or grooming yourself? No   Do you have difficulty using the toilet? No   Do you have difficulty moving around from place to place? Yes   Do you have " trouble with steps or getting out of a bed or a chair? Yes   Current Diet Well Balanced Diet   Dental Exam Up to date   Eye Exam Up to date   Exercise (times per week) 0 times per week   Current Exercises Include No Regular Exercise   Do you need help using the phone?  No   Are you deaf or do you have serious difficulty hearing?  Yes   Do you need help to go to places out of walking distance? Yes   Do you need help shopping? Yes   Do you need help preparing meals?  Yes   Do you need help with housework?  Yes   Do you need help with laundry? Yes   Do you need help taking your medications? Yes   Do you need help managing money? No   Do you ever drive or ride in a car without wearing a seat belt? No   Have you felt unusual stress, anger or loneliness in the last month? No   Who do you live with? Child   If you need help, do you have trouble finding someone available to you? No   Have you been bothered in the last four weeks by sexual problems? No   Do you have difficulty concentrating, remembering or making decisions? No           Age-appropriate Screening Schedule:  Refer to the list below for future screening recommendations based on patient's age, sex and/or medical conditions. Orders for these recommended tests are listed in the plan section. The patient has been provided with a written plan.    Health Maintenance List  Health Maintenance   Topic Date Due    DXA SCAN  Never done    TDAP/TD VACCINES (1 - Tdap) Never done    ZOSTER VACCINE (1 of 2) Never done    DIABETIC EYE EXAM  05/23/2023    COVID-19 Vaccine (3 - 2023-24 season) 09/01/2024    BMI FOLLOWUP  09/05/2024    INFLUENZA VACCINE  08/01/2024    RSV Vaccine - Adults (1 - 1-dose 60+ series) 09/21/2024 (Originally 12/24/1997)    HEMOGLOBIN A1C  03/09/2025    LIPID PANEL  09/09/2025    URINE MICROALBUMIN  09/09/2025    ANNUAL WELLNESS VISIT  09/12/2025    Pneumococcal Vaccine 65+  Completed                                                                                                                                                 CMS Preventative Services Quick Reference  Risk Factors Identified During Encounter  Inactivity/Sedentary: Patient was advised to exercise at least 150 minutes a week per CDC recommendations. and patient was encouraged to mobilize in the house ambulate is much as possible between her TV shows    The above risks/problems have been discussed with the patient.  Pertinent information has been shared with the patient in the After Visit Summary.  An After Visit Summary and PPPS were made available to the patient.    Follow Up:   Next Medicare Wellness visit to be scheduled in 1 year.     Assessment & Plan  Medicare annual wellness visit, subsequent    Class 3 severe obesity due to excess calories with serious comorbidity and body mass index (BMI) of 40.0 to 44.9 in adult  Patient's (Body mass index is 40.04 kg/m².) indicates that they are morbidly/severely obese (BMI > 40 or > 35 with obesity - related health condition) with health conditions that include hypertension . Weight is unchanged. BMI  is above average; BMI management plan is completed. We discussed portion control and increasing exercise.   Essential hypertension  Hypertension is stable and controlled  Continue current treatment regimen.  Blood pressure will be reassessed in 6 months.  Nonrheumatic mitral valve regurgitation    Type 2 diabetes mellitus without complication, without long-term current use of insulin  Diabetes is stable.   Continue current treatment regimen.  Diabetes will be reassessed in 6 months  Gastro-esophageal reflux disease without esophagitis    Anxiety    Iron deficiency anemia, unspecified iron deficiency anemia type    Urge incontinence of urine    Mixed hyperlipidemia   Lipid abnormalities are stable    Plan:  Continue same medication/s without change.      Discussed medication dosage, use, side effects, and goals of treatment in detail.    Counseled patient on  lifestyle modifications to help control hyperlipidemia.     Patient Treatment Goals:   LDL goal is less than 70    Followup in 6 months.             Follow Up:   Return in about 6 months (around 3/12/2025).

## 2024-09-12 NOTE — PROGRESS NOTES
"CHIEF COMPLAINT/ HPI:  Medicare Wellness-subsequent (Wellness exam, lab follow up. Medication request hemrrhoids cream. )    Patient has no new symptoms no recent falls no chest pain          Objective   Vital Signs  Vitals:    09/12/24 0948   BP: 133/45   Pulse: 76   Temp: 98.4 °F (36.9 °C)   SpO2: 91%   Weight: 93 kg (205 lb)   Height: 152.4 cm (60\")      Body mass index is 40.04 kg/m².  Review of Systems   Constitutional: Negative.    HENT: Negative.     Eyes: Negative.    Respiratory: Negative.     Cardiovascular: Negative.    Gastrointestinal: Negative.    Endocrine: Negative.    Genitourinary: Negative.    Musculoskeletal: Negative.    Allergic/Immunologic: Negative.    Neurological: Negative.    Hematological: Negative.    Psychiatric/Behavioral: Negative.        Physical Exam  Constitutional:       General: She is not in acute distress.     Appearance: Normal appearance. She is obese.   HENT:      Head: Normocephalic.      Mouth/Throat:      Mouth: Mucous membranes are moist.   Eyes:      Conjunctiva/sclera: Conjunctivae normal.      Pupils: Pupils are equal, round, and reactive to light.   Cardiovascular:      Rate and Rhythm: Normal rate and regular rhythm.      Pulses: Normal pulses.      Heart sounds: Normal heart sounds.   Pulmonary:      Effort: Pulmonary effort is normal.      Breath sounds: Normal breath sounds.   Abdominal:      General: Bowel sounds are normal.      Palpations: Abdomen is soft.   Musculoskeletal:         General: No swelling. Normal range of motion.      Cervical back: Neck supple.   Skin:     General: Skin is warm and dry.      Coloration: Skin is not jaundiced.   Neurological:      General: No focal deficit present.      Mental Status: She is alert and oriented to person, place, and time. Mental status is at baseline.   Psychiatric:         Mood and Affect: Mood normal.         Behavior: Behavior normal.         Thought Content: Thought content normal.         Judgment: Judgment " normal.        Result Review :   Lab Results   Component Value Date    PROBNP 282.0 09/09/2024    PROBNP 394.7 10/21/2023    PROBNP 77.4 10/26/2021    BNP 52 04/16/2019     CMP          11/27/2023    09:15 3/5/2024    09:08 9/9/2024    09:14   CMP   Glucose 168  150  138    BUN 47  36  33    Creatinine 1.36  1.05  1.15    EGFR 38.3  51.9  46.5    Sodium 141  140  139    Potassium 4.6  5.1  5.2    Chloride 97  101  103    Calcium 10.2  10.1  10.1    Total Protein  6.8  6.9    Albumin  4.1  4.1    Globulin  2.7  2.8    Total Bilirubin  0.4  0.3    Alkaline Phosphatase  55  69    AST (SGOT)  10  12    ALT (SGPT)  11  11    Albumin/Globulin Ratio  1.5  1.5    BUN/Creatinine Ratio 34.6  34.3  28.7    Anion Gap 15.0  12.1  10.0      CBC w/diff          10/21/2023    10:03 3/5/2024    09:08 9/9/2024    09:14   CBC w/Diff   WBC 9.78  9.44  10.48    RBC 3.70  3.69  3.92    Hemoglobin 10.6  10.9  11.5    Hematocrit 35.5  34.2  36.2    MCV 95.9  92.7  92.3    MCH 28.6  29.5  29.3    MCHC 29.9  31.9  31.8    RDW 16.3  13.9  14.4    Platelets 226  248  248    Neutrophil Rel % 81.3  77.2  77.3    Immature Granulocyte Rel % 0.3  0.1  0.4    Lymphocyte Rel % 9.2  13.6  12.9    Monocyte Rel % 5.9  7.3  6.9    Eosinophil Rel % 3.0  1.5  2.2    Basophil Rel % 0.3  0.3  0.3       Lipid Panel          3/5/2024    09:08 9/9/2024    09:14   Lipid Panel   Total Cholesterol 116  124    Triglycerides 95  103    HDL Cholesterol 51  50    VLDL Cholesterol 18  19    LDL Cholesterol  47  55    LDL/HDL Ratio 0.90  1.07       Lab Results   Component Value Date    TSH 1.320 09/09/2024    TSH 2.320 08/31/2023    TSH 2.590 11/16/2020      Lab Results   Component Value Date    FREET4 1.32 09/09/2024    FREET4 1.33 08/31/2023    FREET4 1.2 08/15/2019      A1C Last 3 Results          3/5/2024    09:08 9/9/2024    09:14   HGBA1C Last 3 Results   Hemoglobin A1C 6.30  6.60                        Visit Diagnoses:    ICD-10-CM ICD-9-CM   1. Medicare annual  wellness visit, subsequent  Z00.00 V70.0   2. Class 3 severe obesity due to excess calories with serious comorbidity and body mass index (BMI) of 40.0 to 44.9 in adult  E66.01 278.01    Z68.41 V85.41   3. Essential hypertension  I10 401.9   4. Nonrheumatic mitral valve regurgitation  I34.0 424.0   5. Type 2 diabetes mellitus without complication, without long-term current use of insulin  E11.9 250.00   6. Gastro-esophageal reflux disease without esophagitis  K21.9 530.81   7. Anxiety  F41.9 300.00   8. Iron deficiency anemia, unspecified iron deficiency anemia type  D50.9 280.9   9. Urge incontinence of urine  N39.41 788.31   10. Mixed hyperlipidemia  E78.2 272.2       Assessment and Plan   Diagnoses and all orders for this visit:    1. Medicare annual wellness visit, subsequent (Primary)  -     CBC & Differential; Future  -     Comprehensive Metabolic Panel; Future  -     Iron Profile; Future  -     Vitamin B12 anemia; Future  -     Folate anemia; Future  -     Hemoglobin A1c; Future    2. Class 3 severe obesity due to excess calories with serious comorbidity and body mass index (BMI) of 40.0 to 44.9 in adult  Assessment & Plan:  Patient's (Body mass index is 40.04 kg/m².) indicates that they are morbidly/severely obese (BMI > 40 or > 35 with obesity - related health condition) with health conditions that include hypertension . Weight is unchanged. BMI  is above average; BMI management plan is completed. We discussed portion control and increasing exercise.     Orders:  -     CBC & Differential; Future  -     Comprehensive Metabolic Panel; Future  -     Iron Profile; Future  -     Vitamin B12 anemia; Future  -     Folate anemia; Future  -     Hemoglobin A1c; Future    3. Essential hypertension  Assessment & Plan:  Hypertension is stable and controlled  Continue current treatment regimen.  Blood pressure will be reassessed in 6 months.    Orders:  -     CBC & Differential; Future  -     Comprehensive Metabolic  Panel; Future  -     Iron Profile; Future  -     Vitamin B12 anemia; Future  -     Folate anemia; Future  -     Hemoglobin A1c; Future    4. Nonrheumatic mitral valve regurgitation  -     CBC & Differential; Future  -     Comprehensive Metabolic Panel; Future  -     Iron Profile; Future  -     Vitamin B12 anemia; Future  -     Folate anemia; Future  -     Hemoglobin A1c; Future    5. Type 2 diabetes mellitus without complication, without long-term current use of insulin  Assessment & Plan:  Diabetes is stable.   Continue current treatment regimen.  Diabetes will be reassessed in 6 months    Orders:  -     CBC & Differential; Future  -     Comprehensive Metabolic Panel; Future  -     Iron Profile; Future  -     Vitamin B12 anemia; Future  -     Folate anemia; Future  -     Hemoglobin A1c; Future    6. Gastro-esophageal reflux disease without esophagitis  -     CBC & Differential; Future  -     Comprehensive Metabolic Panel; Future  -     Iron Profile; Future  -     Vitamin B12 anemia; Future  -     Folate anemia; Future  -     Hemoglobin A1c; Future    7. Anxiety  -     CBC & Differential; Future  -     Comprehensive Metabolic Panel; Future  -     Iron Profile; Future  -     Vitamin B12 anemia; Future  -     Folate anemia; Future  -     Hemoglobin A1c; Future    8. Iron deficiency anemia, unspecified iron deficiency anemia type  -     CBC & Differential; Future  -     Comprehensive Metabolic Panel; Future  -     Iron Profile; Future  -     Vitamin B12 anemia; Future  -     Folate anemia; Future  -     Hemoglobin A1c; Future    9. Urge incontinence of urine  -     CBC & Differential; Future  -     Comprehensive Metabolic Panel; Future  -     Iron Profile; Future  -     Vitamin B12 anemia; Future  -     Folate anemia; Future  -     Hemoglobin A1c; Future    10. Mixed hyperlipidemia  Assessment & Plan:   Lipid abnormalities are stable    Plan:  Continue same medication/s without change.      Discussed medication dosage,  use, side effects, and goals of treatment in detail.    Counseled patient on lifestyle modifications to help control hyperlipidemia.     Patient Treatment Goals:   LDL goal is less than 70    Followup in 6 months.    Orders:  -     CBC & Differential; Future  -     Comprehensive Metabolic Panel; Future  -     Iron Profile; Future  -     Vitamin B12 anemia; Future  -     Folate anemia; Future  -     Hemoglobin A1c; Future        Medicare annual wellness questionnaire exam completed September 12, 2024     lower extremity edema ---continue Lasix 20 mg daily, as needed,    Hearing loss, has been to the hearing specialist recently September 2024     Asthma and prior exac. - SEEING DR BANKS --- continues BREO INHDWAYNE, NOV 2019, ---PULM NOD. ON CT APRIL 2019, REPEATED CT ABD/PELVIS AND CHEST ---JUNE 2019, ---------     Getting home oxygen wearing it at night as of March 2024     ALLERGRIES , CONT ZYRTEC AND SINGULAIR    Heart murmur, will follow clinically previous echo showed diastolic dysfunction moderate mitral valve regurgitation mild aortic valve regurgitation normal ejection fraction, mild LVH     HTN -- cont NORVASC 5 mg daily , Coreg 18.375 twice a day, losartan 50 bid,     IRON DEF ANEMIA, continues OTC iron supplements and vitamin C---- does not want to do colonoscopy endoscopy at this time, hemoglobin stable improved 11.5 September 9, 2024     DM , continue AMARYL 1 mg qam , Januvia 50 mg daily, metformin 850 mg twice a day,----hemoglobin A1c 6.6, September 2024, urine microalbumin normal September 9, 2024     OBESITY, continue mobilization portion control, discussed September 2024     DEPRESSION-   continue Paxil 40 mg daily     ELEVATED CHOL-continues PRAVASTATIN 80 MG QHS      CKD stage IIIa, stable aug 2023 --,     NEUROPATHY, RX OPTIONS DISCUSSED --B VITAMINS REC      EYE CHECKED --DOES JUAN LUIS, JAN 2023     DOES JUAN LUIS MAMMO IN [place] --OCT 2017, HAD DIAGN. MAMMO AND DID BX , BENIGN--LMAMMO NOV 2019,  ALPESH ,--OK TO STOP MAY 2020,        Follow Up   Return in about 6 months (around 3/12/2025).  Patient was given instructions and counseling regarding her condition or for health maintenance advice. Please see specific information pulled into the AVS if appropriate.

## 2024-09-12 NOTE — ASSESSMENT & PLAN NOTE
Patient's (Body mass index is 40.04 kg/m².) indicates that they are morbidly/severely obese (BMI > 40 or > 35 with obesity - related health condition) with health conditions that include hypertension . Weight is unchanged. BMI  is above average; BMI management plan is completed. We discussed portion control and increasing exercise.

## 2024-09-12 NOTE — TELEPHONE ENCOUNTER
Pt seen this morning, needing hemorrhoid ointment to be sent in to pharmacy, please advise and I will send to Parmjit.

## 2024-09-16 ENCOUNTER — TELEPHONE (OUTPATIENT)
Dept: INTERNAL MEDICINE | Age: 89
End: 2024-09-16

## 2024-09-16 NOTE — TELEPHONE ENCOUNTER
Caller: [facility] [id number] - [place], [place] - [address] [facility] [address]  [phone] [facility] [phone] FX    Relationship to patient: Pharmacy    Patient is needing: [facility] CALLED STATING THE MEDICATION FOR PRAMOXINE FOAM WAS NOT COVERED UNDER THE PATIENTS INSURANCE AND THEY ALSO DID NOT HAVE THE FOAM IN STOCK. CALLER WANTING TO KNOW IF THE MEDICATION COULD BE SWITCHED BACK TO THE CREAM PATIENT HAD PREVIOUSLY BEEN USING.

## 2024-09-17 NOTE — TELEPHONE ENCOUNTER
Elizabeth, pharmacist from [facility] states the cream patient had been using was Hydrocortisone Pramoxine 1-1% cream, one applicatorful twice daily.  Advised provider agreed that patient can go back to that once prior cream was identified and verified by the pharmacist. That was done today. They will switch back to the HCTZ Pramoxine cream.     Elizabeth - Pharmacist [phone]

## 2024-09-18 DIAGNOSIS — E13.9 DIABETES 1.5, MANAGED AS TYPE 2: ICD-10-CM

## 2024-09-18 RX ORDER — CLOBETASOL PROPIONATE 0.5 MG/G
CREAM TOPICAL
Qty: 45 G | Refills: 3 | Status: SHIPPED | OUTPATIENT
Start: 2024-09-18

## 2024-10-28 RX ORDER — CARVEDILOL 12.5 MG/1
TABLET ORAL
Qty: 270 TABLET | Refills: 1 | Status: SHIPPED | OUTPATIENT
Start: 2024-10-28

## 2024-11-07 ENCOUNTER — OFFICE VISIT (OUTPATIENT)
Dept: CARDIOLOGY | Facility: CLINIC | Age: 89
End: 2024-11-07
Payer: MEDICARE

## 2024-11-07 VITALS
BODY MASS INDEX: 41.27 KG/M2 | HEIGHT: 60 IN | DIASTOLIC BLOOD PRESSURE: 73 MMHG | WEIGHT: 210.2 LBS | SYSTOLIC BLOOD PRESSURE: 164 MMHG | HEART RATE: 66 BPM

## 2024-11-07 DIAGNOSIS — I34.0 NONRHEUMATIC MITRAL VALVE REGURGITATION: ICD-10-CM

## 2024-11-07 DIAGNOSIS — E78.00 HYPERCHOLESTEROLEMIA: ICD-10-CM

## 2024-11-07 DIAGNOSIS — I10 ESSENTIAL HYPERTENSION: ICD-10-CM

## 2024-11-07 DIAGNOSIS — I25.10 ATHEROSCLEROSIS OF NATIVE CORONARY ARTERY OF NATIVE HEART WITHOUT ANGINA PECTORIS: Primary | ICD-10-CM

## 2024-11-07 NOTE — ASSESSMENT & PLAN NOTE
Moderate by echocardiogram from 2022.  She does have a murmur on exam.  She is asymptomatic.  No further testing is indicated at this time.

## 2024-11-07 NOTE — PROGRESS NOTES
Chief Complaint  Hypertension and Follow-up (6 MO F/U. )    Subjective        History of Present Illness  Devi Diallo presents to [facility] for follow up.   Patient is an 88-year-old female with past medical history outlined below, significant for coronary artery disease, diabetes, hypertension, hyperlipidemia who presents for follow-up.  She is doing well from a cardiac standpoint.  She notes no chest pain or discomfort.  She denies palpitations.  She has dyspnea on moderate to heavy exertion which is stable.  She denies any edema, dizziness or lightheadedness.    Past Medical History:   Diagnosis Date    Arthritis     Atherosclerotic heart disease of native coronary artery without angina pectoris 1/1/2002    cardiac catheterization 2002    Diabetes     Essential hypertension 9/20/2012    Hyperlipemia     Hypertension     Pure hypercholesterolemia 4/25/2014    Reflux esophagitis     Seasonal allergies     Stage 2 chronic kidney disease 9/20/2012       ALLERGY  Allergies   Allergen Reactions    Amoxicillin-Pot Clavulanate Hives and Unknown - Low Severity    Cipro [Ciprofloxacin Hcl] Hives    Ciprofloxacin Unknown - Low Severity    Clindamycin Unknown - Low Severity    Clindamycin/Lincomycin Rash    Macrobid [Nitrofurantoin] Hives    Penicillins Hives    Sulfamethoxazole-Trimethoprim Nausea Only and Unknown - Low Severity        Past Surgical History:   Procedure Laterality Date    COLONOSCOPY      EYE SURGERY      Eye Implant    HYSTERECTOMY          Social History     Socioeconomic History    Marital status:    Tobacco Use    Smoking status: Never     Passive exposure: Never    Smokeless tobacco: Never   Vaping Use    Vaping status: Never Used   Substance and Sexual Activity    Alcohol use: Never    Drug use: Never    Sexual activity: Defer       Family History   Problem Relation Age of Onset    Cancer Mother         Unspecified    Arthritis Mother     Diabetes Brother          Unspecified type    Arthritis Brother         Current Outpatient Medications on File Prior to Visit   Medication Sig    albuterol sulfate  (90 Base) MCG/ACT inhaler Inhale 2 puffs Every 4 (Four) Hours As Needed for Wheezing.    amLODIPine (NORVASC) 5 MG tablet TAKE 1 TABLET BY MOUTH DAILY    Ascorbic Acid (Vitamin C) 500 MG chewable tablet Chew 500 mg Every Night. Gummies    aspirin 81 MG EC tablet Take 1 tablet by mouth Every Night.    carvedilol (COREG) 12.5 MG tablet TAKE 1 AND 1/2 TABLET BY MOUTH TWO TIMES A DAY    cetirizine (zyrTEC) 10 MG tablet Take 1 tablet by mouth Daily.    Cholecalciferol (Vitamin D3) 25 MCG (1000 UT) chewable tablet Chew 1,000 Units Every Night. Gummies    clobetasol propionate (TEMOVATE) 0.05 % cream APPLY TO AFFECTED AREA(S) TWO TIMES A DAY    Cranberry 500 MG chewable tablet Chew 1,000 mg Every Night. Gummies    estradiol (ESTRACE) 0.1 MG/GM vaginal cream INSERT 2 GRAMS INTO THE VAGINA DAILY (Patient taking differently: Insert 2 g into the vagina 2 (Two) Times a Day As Needed.)    ezetimibe (ZETIA) 10 MG tablet Take 1 tablet by mouth Every Night.    ferrous sulfate 325 (65 FE) MG tablet Take 1 tablet by mouth Daily With Breakfast. (Patient taking differently: Take 1 tablet by mouth Every Night.)    Fluticasone Furoate-Vilanterol (Breo Ellipta) 200-25 MCG/ACT inhaler Inhale 1 puff Daily.    glucose blood test strip Check glucose bid. Diagnosis: E11.9    Januvia 50 MG tablet TAKE ONE TABLET BY MOUTH DAILY    losartan (COZAAR) 50 MG tablet TAKE 1 TABLET BY MOUTH TWICE A DAY    metFORMIN (GLUCOPHAGE) 850 MG tablet TAKE 1 TABLET BY MOUTH TWICE A DAY    montelukast (SINGULAIR) 10 MG tablet TAKE 1 TABLET BY MOUTH EVERY MORNING    multivitamin with minerals tablet tablet Take 1 tablet by mouth Every Night. Gummies    nitroglycerin (NITROSTAT) 0.4 MG SL tablet 1 under the tongue as needed for angina, may repeat q5mins for up three doses    pantoprazole (PROTONIX) 40 MG EC tablet TAKE  "ONE TABLET BY MOUTH DAILY    PARoxetine (PAXIL) 40 MG tablet TAKE ONE TABLET BY MOUTH DAILY    Pramoxine HCl, Perianal, 1 % foam Insert  into the rectum 2 (Two) Times a Day As Needed for Hemorrhoids.    pravastatin (PRAVACHOL) 80 MG tablet TAKE 1 TABLET BY MOUTH DAILY    trimethoprim-polymyxin b (Polytrim) 25461-0.1 UNIT/ML-% ophthalmic solution Administer 1 drop to the right eye Every 4 (Four) Hours.    Zinc 25 MG tablet Take 25 mg by mouth Every Night. Gummies     No current facility-administered medications on file prior to visit.       Objective   Vitals:    11/07/24 0955   BP: 164/73   Pulse: 66   Weight: 95.3 kg (210 lb 3.2 oz)   Height: 152.4 cm (60\")       Physical Exam  Constitutional:       General: She is awake. She is not in acute distress.     Appearance: Normal appearance.   HENT:      Head: Normocephalic.      Nose: Nose normal. No congestion.   Eyes:      Extraocular Movements: Extraocular movements intact.      Conjunctiva/sclera: Conjunctivae normal.      Pupils: Pupils are equal, round, and reactive to light.   Neck:      Thyroid: No thyromegaly.      Vascular: No JVD.   Cardiovascular:      Rate and Rhythm: Normal rate and regular rhythm.      Chest Wall: PMI is not displaced.      Pulses: Normal pulses.      Heart sounds: S1 normal and S2 normal. Murmur heard.      No friction rub. No gallop. No S3 or S4 sounds.   Pulmonary:      Effort: Pulmonary effort is normal.      Breath sounds: Normal breath sounds. No wheezing, rhonchi or rales.   Abdominal:      General: Bowel sounds are normal.      Palpations: Abdomen is soft.      Tenderness: There is no abdominal tenderness.   Musculoskeletal:      Cervical back: No tenderness.      Right lower leg: No edema.      Left lower leg: No edema.   Lymphadenopathy:      Cervical: No cervical adenopathy.   Skin:     General: Skin is warm and dry.      Capillary Refill: Capillary refill takes less than 2 seconds.      Coloration: Skin is not cyanotic.      " Findings: No petechiae or rash.      Nails: There is no clubbing.   Neurological:      Mental Status: She is alert.   Psychiatric:         Mood and Affect: Mood normal.         Behavior: Behavior is cooperative.           Result Review     The following data was reviewed by WALTER Sadler on 11/07/24.      CMP          11/27/2023    09:15 3/5/2024    09:08 9/9/2024    09:14   CMP   Glucose 168  150  138    BUN 47  36  33    Creatinine 1.36  1.05  1.15    EGFR 38.3  51.9  46.5    Sodium 141  140  139    Potassium 4.6  5.1  5.2    Chloride 97  101  103    Calcium 10.2  10.1  10.1    Total Protein  6.8  6.9    Albumin  4.1  4.1    Globulin  2.7  2.8    Total Bilirubin  0.4  0.3    Alkaline Phosphatase  55  69    AST (SGOT)  10  12    ALT (SGPT)  11  11    Albumin/Globulin Ratio  1.5  1.5    BUN/Creatinine Ratio 34.6  34.3  28.7    Anion Gap 15.0  12.1  10.0      CBC w/diff          3/5/2024    09:08 9/9/2024    09:14   CBC w/Diff   WBC 9.44  10.48    RBC 3.69  3.92    Hemoglobin 10.9  11.5    Hematocrit 34.2  36.2    MCV 92.7  92.3    MCH 29.5  29.3    MCHC 31.9  31.8    RDW 13.9  14.4    Platelets 248  248    Neutrophil Rel % 77.2  77.3    Immature Granulocyte Rel % 0.1  0.4    Lymphocyte Rel % 13.6  12.9    Monocyte Rel % 7.3  6.9    Eosinophil Rel % 1.5  2.2    Basophil Rel % 0.3  0.3       Lipid Panel          3/5/2024    09:08 9/9/2024    09:14   Lipid Panel   Total Cholesterol 116  124    Triglycerides 95  103    HDL Cholesterol 51  50    VLDL Cholesterol 18  19    LDL Cholesterol  47  55    LDL/HDL Ratio 0.90  1.07        Results for orders placed in visit on 03/17/22    Adult Transthoracic Echo Complete W/ Cont if Necessary Per Protocol    Interpretation Summary  · Left ventricular wall thickness is consistent with mild concentric hypertrophy.  · Estimated left ventricular EF was in agreement with the calculated left ventricular EF. Left ventricular ejection fraction appears to be 61 - 65%. Left  ventricular systolic function is normal.  · Left ventricular diastolic function is consistent with (grade I) impaired relaxation.  · Moderate mitral valve regurgitation is present.  · Estimated right ventricular systolic pressure from tricuspid regurgitation is mildly elevated (35-45 mmHg).  · Mild aortic vakve regurgitation is present.      No results found for this or any previous visit.          Procedures      Assessment & Plan  Atherosclerosis of native coronary artery of native heart without angina pectoris  Patient had a cath in 2002 with 40% ostial left main stenosis.  She otherwise had no disease.  She is asymptomatic.  Essential hypertension  Blood pressure is under good control.  Continue current regimen.  Hypercholesterolemia  Continue the pravastatin and Zetia.  Nonrheumatic mitral valve regurgitation  Moderate by echocardiogram from 2022.  She does have a murmur on exam.  She is asymptomatic.  No further testing is indicated at this time.    The medical services provided during this encounter are part of ongoing care related to this patient's single serious condition or complex condition.  Follow Up   Return in about 9 months (around 8/7/2025) for With Dr. Romero.    Patient was given instructions and counseling regarding her condition or for health maintenance advice. Please see specific information pulled into the AVS if appropriate.     Tess South, APRN  11/07/24  10:06 EST    Dictated Utilizing Dragon Dictation

## 2024-11-07 NOTE — ASSESSMENT & PLAN NOTE
Patient had a cath in 2002 with 40% ostial left main stenosis.  She otherwise had no disease.  She is asymptomatic.

## 2024-11-20 ENCOUNTER — TELEPHONE (OUTPATIENT)
Dept: CARDIOLOGY | Facility: CLINIC | Age: 89
End: 2024-11-20
Payer: MEDICARE

## 2024-11-20 NOTE — TELEPHONE ENCOUNTER
The [facility] received a fax that requires your attention. The document has been indexed to the patient’s chart for your review.      Reason for sending: EXT MED REC NOTIF    Documents Description: PATIENT ON 10 OR MORE MEDS    Name of Sender: JUAN DAVID    Date Indexed: 11.20.24

## 2025-01-01 DIAGNOSIS — F41.9 ANXIETY: ICD-10-CM

## 2025-01-02 RX ORDER — PAROXETINE 40 MG/1
40 TABLET, FILM COATED ORAL DAILY
Qty: 90 TABLET | Refills: 3 | Status: SHIPPED | OUTPATIENT
Start: 2025-01-02

## 2025-01-02 RX ORDER — MONTELUKAST SODIUM 10 MG/1
10 TABLET ORAL EVERY MORNING
Qty: 90 TABLET | Refills: 1 | Status: SHIPPED | OUTPATIENT
Start: 2025-01-02

## 2025-02-26 ENCOUNTER — OFFICE VISIT (OUTPATIENT)
Dept: PULMONOLOGY | Facility: CLINIC | Age: 89
End: 2025-02-26
Payer: MEDICARE

## 2025-02-26 VITALS
SYSTOLIC BLOOD PRESSURE: 139 MMHG | BODY MASS INDEX: 41.23 KG/M2 | OXYGEN SATURATION: 94 % | HEIGHT: 60 IN | HEART RATE: 60 BPM | WEIGHT: 210 LBS | TEMPERATURE: 97.6 F | RESPIRATION RATE: 16 BRPM | DIASTOLIC BLOOD PRESSURE: 60 MMHG

## 2025-02-26 DIAGNOSIS — E66.01 CLASS 3 SEVERE OBESITY WITH SERIOUS COMORBIDITY AND BODY MASS INDEX (BMI) OF 40.0 TO 44.9 IN ADULT, UNSPECIFIED OBESITY TYPE: ICD-10-CM

## 2025-02-26 DIAGNOSIS — J42 CHRONIC BRONCHITIS, UNSPECIFIED CHRONIC BRONCHITIS TYPE: Primary | Chronic | ICD-10-CM

## 2025-02-26 DIAGNOSIS — E66.813 CLASS 3 SEVERE OBESITY WITH SERIOUS COMORBIDITY AND BODY MASS INDEX (BMI) OF 40.0 TO 44.9 IN ADULT, UNSPECIFIED OBESITY TYPE: ICD-10-CM

## 2025-02-26 DIAGNOSIS — J45.40 MODERATE PERSISTENT ASTHMA WITHOUT COMPLICATION: Chronic | ICD-10-CM

## 2025-02-26 DIAGNOSIS — J96.11 CHRONIC RESPIRATORY FAILURE WITH HYPOXIA: Chronic | ICD-10-CM

## 2025-02-26 DIAGNOSIS — R06.09 DYSPNEA ON EXERTION: ICD-10-CM

## 2025-02-26 DIAGNOSIS — J30.2 SEASONAL ALLERGIES: ICD-10-CM

## 2025-02-26 DIAGNOSIS — Z23 ENCOUNTER FOR IMMUNIZATION: ICD-10-CM

## 2025-02-26 NOTE — PROGRESS NOTES
Primary Care Provider  Jacobo Redmond MD     Referring Provider  No ref. provider found       Patient or patient representative verbalized consent for the use of Ambient Listening during the visit with  WALTER Lam for chart documentation. 2/26/2025  11:01 EST    Chief Complaint  Asthma, Shortness of Breath (With exertion ), and Follow-up (6 month f/up )    Subjective          Devi Diallo presents to [facility]  History of Present Illness  Devi Diallo is a 89 y.o. female patient of Dr. Timmons here for management of chronic bronchitis, moderate persistent asthma, seasonal allergies and dyspnea on exertion.      History of Present Illness  The patient is an 89-year-old female who presents for evaluation of respiratory issues.    She reports satisfactory respiratory function, with no recent pulmonary-related illnesses. She has been utilizing her Breo inhaler and albuterol, the latter only sparingly, approximately twice weekly. She continues to use supplemental oxygen during sleep. Upon arrival at the clinic, her oxygen saturation was initially low, requiring warming of her fingers and deep breathing exercises to achieve a reading of 94..  She continues to use oxygen at night and during the day as needed.  She has benefiting from its use.    She expresses a desire to receive her final pneumonia vaccine today.    MEDICATIONS  Breo, albuterol    IMMUNIZATIONS  She has received previous pneumonia vaccines and is due for her last one.       Her history of smoking is   Tobacco Use: Low Risk  (2/26/2025)    Patient History     Smoking Tobacco Use: Never     Smokeless Tobacco Use: Never     Passive Exposure: Never   .    Review of Systems   Constitutional:  Negative for chills, fatigue, fever, unexpected weight gain and unexpected weight loss.   HENT:  Congestion: Nasal.    Respiratory:  Positive for shortness of breath. Negative for apnea, cough  and wheezing.         Negative for Hemoptysis     Cardiovascular:  Negative for chest pain, palpitations and leg swelling.   Skin:         Negative for cyanosis      Sleep: Negative for Excessive daytime sleepiness  Negative for morning headaches  Negative for Snoring    Family History   Problem Relation Age of Onset    Cancer Mother         Unspecified    Arthritis Mother     Diabetes Brother         Unspecified type    Arthritis Brother         Social History     Socioeconomic History    Marital status:    Tobacco Use    Smoking status: Never     Passive exposure: Never    Smokeless tobacco: Never   Vaping Use    Vaping status: Never Used   Substance and Sexual Activity    Alcohol use: Never    Drug use: Never    Sexual activity: Defer        Past Medical History:   Diagnosis Date    Arthritis     Atherosclerotic heart disease of native coronary artery without angina pectoris 1/1/2002    cardiac catheterization 2002    Diabetes     Essential hypertension 9/20/2012    Hyperlipemia     Hypertension     Pure hypercholesterolemia 4/25/2014    Reflux esophagitis     Seasonal allergies     Stage 2 chronic kidney disease 9/20/2012        Immunization History   Administered Date(s) Administered    COVID-19 (MODERNA) 1st,2nd,3rd Dose Monovalent 02/18/2021, 03/19/2021    Fluzone High-Dose 65+YRS 10/16/2020, 10/18/2024    Fluzone High-Dose 65+yrs 10/08/2021, 10/05/2022, 10/17/2023    Pneumococcal Conjugate 13-Valent (PCV13) 09/01/2016    Pneumococcal Conjugate 20-Valent (PCV20) 02/26/2025    Pneumococcal Polysaccharide (PPSV23) 09/01/2019    Pneumococcal, Unspecified 10/20/2019, 10/20/2019         Allergies   Allergen Reactions    Amoxicillin-Pot Clavulanate Hives and Unknown - Low Severity    Cipro [Ciprofloxacin Hcl] Hives    Ciprofloxacin Unknown - Low Severity    Clindamycin Unknown - Low Severity    Clindamycin/Lincomycin Rash    Macrobid [Nitrofurantoin] Hives    Penicillins Hives     Sulfamethoxazole-Trimethoprim Nausea Only and Unknown - Low Severity          Current Outpatient Medications:     albuterol sulfate  (90 Base) MCG/ACT inhaler, Inhale 2 puffs Every 4 (Four) Hours As Needed for Wheezing., Disp: , Rfl:     amLODIPine (NORVASC) 5 MG tablet, TAKE 1 TABLET BY MOUTH DAILY, Disp: 90 tablet, Rfl: 3    Ascorbic Acid (Vitamin C) 500 MG chewable tablet, Chew 500 mg Every Night. Gummies, Disp: , Rfl:     aspirin 81 MG EC tablet, Take 1 tablet by mouth Every Night., Disp: , Rfl:     carvedilol (COREG) 12.5 MG tablet, TAKE 1 AND 1/2 TABLET BY MOUTH TWO TIMES A DAY, Disp: 270 tablet, Rfl: 1    cetirizine (zyrTEC) 10 MG tablet, Take 1 tablet by mouth Daily., Disp: , Rfl:     Cholecalciferol (Vitamin D3) 25 MCG (1000 UT) chewable tablet, Chew 1,000 Units Every Night. Gummies, Disp: , Rfl:     clobetasol propionate (TEMOVATE) 0.05 % cream, APPLY TO AFFECTED AREA(S) TWO TIMES A DAY, Disp: 45 g, Rfl: 3    Cranberry 500 MG chewable tablet, Chew 1,000 mg Every Night. Gummies, Disp: , Rfl:     estradiol (ESTRACE) 0.1 MG/GM vaginal cream, INSERT 2 GRAMS INTO THE VAGINA DAILY (Patient taking differently: Insert 2 g into the vagina 2 (Two) Times a Day As Needed.), Disp: 42.5 g, Rfl: 12    ezetimibe (ZETIA) 10 MG tablet, Take 1 tablet by mouth Every Night., Disp: 90 tablet, Rfl: 3    ferrous sulfate 325 (65 FE) MG tablet, Take 1 tablet by mouth Daily With Breakfast. (Patient taking differently: Take 1 tablet by mouth Every Night.), Disp: 90 tablet, Rfl: 3    Fluticasone Furoate-Vilanterol (Breo Ellipta) 200-25 MCG/ACT inhaler, Inhale 1 puff Daily., Disp: 60 each, Rfl: 11    glucose blood test strip, Check glucose bid. Diagnosis: E11.9, Disp: 100 each, Rfl: 5    Januvia 50 MG tablet, TAKE ONE TABLET BY MOUTH DAILY, Disp: 90 tablet, Rfl: 3    losartan (COZAAR) 50 MG tablet, TAKE 1 TABLET BY MOUTH TWICE A DAY, Disp: 180 tablet, Rfl: 2    metFORMIN (GLUCOPHAGE) 850 MG tablet, TAKE 1 TABLET BY MOUTH TWICE A  DAY, Disp: 180 tablet, Rfl: 3    montelukast (SINGULAIR) 10 MG tablet, TAKE 1 TABLET BY MOUTH EVERY MORNING, Disp: 90 tablet, Rfl: 1    multivitamin with minerals tablet tablet, Take 1 tablet by mouth Every Night. Gummies, Disp: , Rfl:     nitroglycerin (NITROSTAT) 0.4 MG SL tablet, 1 under the tongue as needed for angina, may repeat q5mins for up three doses, Disp: 100 tablet, Rfl: 11    pantoprazole (PROTONIX) 40 MG EC tablet, TAKE ONE TABLET BY MOUTH DAILY, Disp: 90 tablet, Rfl: 3    PARoxetine (PAXIL) 40 MG tablet, TAKE 1 TABLET BY MOUTH DAILY, Disp: 90 tablet, Rfl: 3    Pramoxine HCl, Perianal, 1 % foam, Insert  into the rectum 2 (Two) Times a Day As Needed for Hemorrhoids., Disp: 1 each, Rfl: 5    pravastatin (PRAVACHOL) 80 MG tablet, TAKE 1 TABLET BY MOUTH DAILY, Disp: 90 tablet, Rfl: 3    trimethoprim-polymyxin b (Polytrim) 86106-5.1 UNIT/ML-% ophthalmic solution, Administer 1 drop to the right eye Every 4 (Four) Hours., Disp: 1 each, Rfl: 1    Zinc 25 MG tablet, Take 25 mg by mouth Every Night. Gummies, Disp: , Rfl:      Objective   Physical Exam  Constitutional:       General: She is not in acute distress.     Appearance: Normal appearance. She is normal weight.   HENT:      Right Ear: Hearing normal.      Left Ear: Hearing normal.      Nose: No nasal tenderness or congestion.      Mouth/Throat:      Mouth: Mucous membranes are moist. No oral lesions.   Eyes:      Extraocular Movements: Extraocular movements intact.      Pupils: Pupils are equal, round, and reactive to light.   Cardiovascular:      Rate and Rhythm: Normal rate and regular rhythm.      Pulses: Normal pulses.      Heart sounds: Normal heart sounds. No murmur heard.  Pulmonary:      Effort: Pulmonary effort is normal.      Breath sounds: Normal breath sounds. No wheezing, rhonchi or rales.   Musculoskeletal:      Right lower leg: No edema.      Left lower leg: No edema.   Skin:     General: Skin is warm and dry.      Findings: No lesion or  "rash.   Neurological:      General: No focal deficit present.      Mental Status: She is alert and oriented to person, place, and time.   Psychiatric:         Mood and Affect: Affect normal. Mood is not anxious or depressed.         Vital Signs:   /60 (BP Location: Right arm, Patient Position: Sitting, Cuff Size: Adult)   Pulse 60   Temp 97.6 °F (36.4 °C) (Oral)   Resp 16   Ht 152.4 cm (60\")   Wt 95.3 kg (210 lb)   SpO2 94% Comment: RA; 4 L's PRN/HS  BMI 41.01 kg/m²        Result Review :   The following data was reviewed by: WALTER Lam on 02/26/2025:  CMP          3/5/2024    09:08 9/9/2024    09:14   CMP   Glucose 150  138    BUN 36  33    Creatinine 1.05  1.15    EGFR 51.9  46.5    Sodium 140  139    Potassium 5.1  5.2    Chloride 101  103    Calcium 10.1  10.1    Total Protein 6.8  6.9    Albumin 4.1  4.1    Globulin 2.7  2.8    Total Bilirubin 0.4  0.3    Alkaline Phosphatase 55  69    AST (SGOT) 10  12    ALT (SGPT) 11  11    Albumin/Globulin Ratio 1.5  1.5    BUN/Creatinine Ratio 34.3  28.7    Anion Gap 12.1  10.0      CBC w/diff          3/5/2024    09:08 9/9/2024    09:14   CBC w/Diff   WBC 9.44  10.48    RBC 3.69  3.92    Hemoglobin 10.9  11.5    Hematocrit 34.2  36.2    MCV 92.7  92.3    MCH 29.5  29.3    MCHC 31.9  31.8    RDW 13.9  14.4    Platelets 248  248    Neutrophil Rel % 77.2  77.3    Immature Granulocyte Rel % 0.1  0.4    Lymphocyte Rel % 13.6  12.9    Monocyte Rel % 7.3  6.9    Eosinophil Rel % 1.5  2.2    Basophil Rel % 0.3  0.3      Data reviewed : Radiologic studies chest xray 10/21/2023, chest xray 10/22/2023, PFT 5/16/2019 and my last office note    Procedures        Assessment and Plan    Diagnoses and all orders for this visit:    1. Chronic bronchitis, unspecified chronic bronchitis type (Primary)  Comments:  Continue Breo    2. Moderate persistent asthma without complication  Comments:  Continue Breo    3. Seasonal allergies  Comments:  continue Zyrtec    4. " Dyspnea on exertion  Comments:  continue albuterol as needed    5. Chronic respiratory failure with hypoxia  Comments:  continue oxygen.  patient using and benefiting.    6. Encounter for immunization  -     Pneumococcal Conjugate Vaccine 20-Valent (PCV20)    7. Class 3 severe obesity with serious comorbidity and body mass index (BMI) of 40.0 to 44.9 in adult, unspecified obesity type        Assessment & Plan  1.asthma  Her respiratory status is currently stable. She is using the Breo inhaler regularly and albuterol sparingly, about twice a week. She continues to use oxygen as needed, particularly during sleep. The current medication regimen will be maintained without any modifications. The prescription for Breo is valid until August 2025. She does not require a refill for albuterol at this time but can request it from the pharmacy if needed.    2. Health Maintenance.  She will receive her final pneumonia vaccine today. The influenza vaccine will be administered in October 2025, contingent upon availability. If not available, Dr. Redmond can provide the influenza vaccine.    Follow-up  The patient is scheduled for a follow-up visit in 6 months.          Follow Up   Return in about 6 months (around 8/26/2025) for Recheck with Harleen.  Patient was given instructions and counseling regarding her condition or for health maintenance advice. Please see specific information pulled into the AVS if appropriate.

## 2025-02-27 RX ORDER — LOSARTAN POTASSIUM 50 MG/1
50 TABLET ORAL 2 TIMES DAILY
Qty: 180 TABLET | Refills: 2 | Status: SHIPPED | OUTPATIENT
Start: 2025-02-27

## 2025-03-18 RX ORDER — SITAGLIPTIN 50 MG/1
50 TABLET, FILM COATED ORAL DAILY
Qty: 90 TABLET | Refills: 3 | Status: SHIPPED | OUTPATIENT
Start: 2025-03-18

## 2025-03-19 ENCOUNTER — LAB (OUTPATIENT)
Dept: LAB | Facility: HOSPITAL | Age: 89
End: 2025-03-19
Payer: MEDICARE

## 2025-03-19 DIAGNOSIS — I10 ESSENTIAL HYPERTENSION: ICD-10-CM

## 2025-03-19 DIAGNOSIS — N39.41 URGE INCONTINENCE OF URINE: ICD-10-CM

## 2025-03-19 DIAGNOSIS — D50.9 IRON DEFICIENCY ANEMIA, UNSPECIFIED IRON DEFICIENCY ANEMIA TYPE: ICD-10-CM

## 2025-03-19 DIAGNOSIS — K21.9 GASTRO-ESOPHAGEAL REFLUX DISEASE WITHOUT ESOPHAGITIS: ICD-10-CM

## 2025-03-19 DIAGNOSIS — E66.01 CLASS 3 SEVERE OBESITY DUE TO EXCESS CALORIES WITH SERIOUS COMORBIDITY AND BODY MASS INDEX (BMI) OF 40.0 TO 44.9 IN ADULT: ICD-10-CM

## 2025-03-19 DIAGNOSIS — E11.9 TYPE 2 DIABETES MELLITUS WITHOUT COMPLICATION, WITHOUT LONG-TERM CURRENT USE OF INSULIN: ICD-10-CM

## 2025-03-19 DIAGNOSIS — I34.0 NONRHEUMATIC MITRAL VALVE REGURGITATION: ICD-10-CM

## 2025-03-19 DIAGNOSIS — E66.813 CLASS 3 SEVERE OBESITY DUE TO EXCESS CALORIES WITH SERIOUS COMORBIDITY AND BODY MASS INDEX (BMI) OF 40.0 TO 44.9 IN ADULT: ICD-10-CM

## 2025-03-19 DIAGNOSIS — Z00.00 MEDICARE ANNUAL WELLNESS VISIT, SUBSEQUENT: ICD-10-CM

## 2025-03-19 DIAGNOSIS — E78.2 MIXED HYPERLIPIDEMIA: ICD-10-CM

## 2025-03-19 DIAGNOSIS — F41.9 ANXIETY: ICD-10-CM

## 2025-03-19 LAB
ALBUMIN SERPL-MCNC: 3.9 G/DL (ref 3.5–5.2)
ALBUMIN/GLOB SERPL: 1.1 G/DL
ALP SERPL-CCNC: 64 U/L (ref 39–117)
ALT SERPL W P-5'-P-CCNC: 11 U/L (ref 1–33)
ANION GAP SERPL CALCULATED.3IONS-SCNC: 9.1 MMOL/L (ref 5–15)
AST SERPL-CCNC: 12 U/L (ref 1–32)
BASOPHILS # BLD AUTO: 0.02 10*3/MM3 (ref 0–0.2)
BASOPHILS NFR BLD AUTO: 0.2 % (ref 0–1.5)
BILIRUB SERPL-MCNC: 0.5 MG/DL (ref 0–1.2)
BUN SERPL-MCNC: 31 MG/DL (ref 8–23)
BUN/CREAT SERPL: 24.6 (ref 7–25)
CALCIUM SPEC-SCNC: 10.7 MG/DL (ref 8.6–10.5)
CHLORIDE SERPL-SCNC: 102 MMOL/L (ref 98–107)
CO2 SERPL-SCNC: 27.9 MMOL/L (ref 22–29)
CREAT SERPL-MCNC: 1.26 MG/DL (ref 0.57–1)
DEPRECATED RDW RBC AUTO: 46.2 FL (ref 37–54)
EGFRCR SERPLBLD CKD-EPI 2021: 41.4 ML/MIN/1.73
EOSINOPHIL # BLD AUTO: 0.22 10*3/MM3 (ref 0–0.4)
EOSINOPHIL NFR BLD AUTO: 2.2 % (ref 0.3–6.2)
ERYTHROCYTE [DISTWIDTH] IN BLOOD BY AUTOMATED COUNT: 13.5 % (ref 12.3–15.4)
FOLATE SERPL-MCNC: >20 NG/ML (ref 4.78–24.2)
GLOBULIN UR ELPH-MCNC: 3.4 GM/DL
GLUCOSE SERPL-MCNC: 161 MG/DL (ref 65–99)
HBA1C MFR BLD: 6.5 % (ref 4.8–5.6)
HCT VFR BLD AUTO: 37.3 % (ref 34–46.6)
HGB BLD-MCNC: 11.6 G/DL (ref 12–15.9)
IMM GRANULOCYTES # BLD AUTO: 0.04 10*3/MM3 (ref 0–0.05)
IMM GRANULOCYTES NFR BLD AUTO: 0.4 % (ref 0–0.5)
IRON 24H UR-MRATE: 44 MCG/DL (ref 37–145)
IRON SATN MFR SERPL: 12 % (ref 20–50)
LYMPHOCYTES # BLD AUTO: 1.23 10*3/MM3 (ref 0.7–3.1)
LYMPHOCYTES NFR BLD AUTO: 12.2 % (ref 19.6–45.3)
MCH RBC QN AUTO: 29.1 PG (ref 26.6–33)
MCHC RBC AUTO-ENTMCNC: 31.1 G/DL (ref 31.5–35.7)
MCV RBC AUTO: 93.5 FL (ref 79–97)
MONOCYTES # BLD AUTO: 0.78 10*3/MM3 (ref 0.1–0.9)
MONOCYTES NFR BLD AUTO: 7.8 % (ref 5–12)
NEUTROPHILS NFR BLD AUTO: 7.77 10*3/MM3 (ref 1.7–7)
NEUTROPHILS NFR BLD AUTO: 77.2 % (ref 42.7–76)
NRBC BLD AUTO-RTO: 0 /100 WBC (ref 0–0.2)
PLATELET # BLD AUTO: 265 10*3/MM3 (ref 140–450)
PMV BLD AUTO: 9.6 FL (ref 6–12)
POTASSIUM SERPL-SCNC: 5.2 MMOL/L (ref 3.5–5.2)
PROT SERPL-MCNC: 7.3 G/DL (ref 6–8.5)
RBC # BLD AUTO: 3.99 10*6/MM3 (ref 3.77–5.28)
SODIUM SERPL-SCNC: 139 MMOL/L (ref 136–145)
TIBC SERPL-MCNC: 378 MCG/DL (ref 298–536)
TRANSFERRIN SERPL-MCNC: 254 MG/DL (ref 200–360)
VIT B12 BLD-MCNC: 293 PG/ML (ref 211–946)
WBC NRBC COR # BLD AUTO: 10.06 10*3/MM3 (ref 3.4–10.8)

## 2025-03-19 PROCEDURE — 80053 COMPREHEN METABOLIC PANEL: CPT

## 2025-03-19 PROCEDURE — 84466 ASSAY OF TRANSFERRIN: CPT

## 2025-03-19 PROCEDURE — 85025 COMPLETE CBC W/AUTO DIFF WBC: CPT

## 2025-03-19 PROCEDURE — 83036 HEMOGLOBIN GLYCOSYLATED A1C: CPT

## 2025-03-19 PROCEDURE — 82607 VITAMIN B-12: CPT

## 2025-03-19 PROCEDURE — 83540 ASSAY OF IRON: CPT

## 2025-03-19 PROCEDURE — 36415 COLL VENOUS BLD VENIPUNCTURE: CPT

## 2025-03-19 PROCEDURE — 82746 ASSAY OF FOLIC ACID SERUM: CPT

## 2025-03-20 ENCOUNTER — OFFICE VISIT (OUTPATIENT)
Dept: INTERNAL MEDICINE | Age: 89
End: 2025-03-20
Payer: MEDICARE

## 2025-03-20 VITALS
BODY MASS INDEX: 41.03 KG/M2 | SYSTOLIC BLOOD PRESSURE: 143 MMHG | HEIGHT: 60 IN | WEIGHT: 209 LBS | TEMPERATURE: 98.2 F | DIASTOLIC BLOOD PRESSURE: 76 MMHG | HEART RATE: 74 BPM

## 2025-03-20 DIAGNOSIS — N18.31 STAGE 3A CHRONIC KIDNEY DISEASE: ICD-10-CM

## 2025-03-20 DIAGNOSIS — I34.0 NONRHEUMATIC MITRAL VALVE REGURGITATION: ICD-10-CM

## 2025-03-20 DIAGNOSIS — I25.10 ATHEROSCLEROSIS OF NATIVE CORONARY ARTERY OF NATIVE HEART WITHOUT ANGINA PECTORIS: ICD-10-CM

## 2025-03-20 DIAGNOSIS — F41.9 ANXIETY: ICD-10-CM

## 2025-03-20 DIAGNOSIS — K21.9 GASTRO-ESOPHAGEAL REFLUX DISEASE WITHOUT ESOPHAGITIS: ICD-10-CM

## 2025-03-20 DIAGNOSIS — N39.41 URGE INCONTINENCE OF URINE: ICD-10-CM

## 2025-03-20 DIAGNOSIS — I10 ESSENTIAL HYPERTENSION: Primary | Chronic | ICD-10-CM

## 2025-03-20 DIAGNOSIS — D50.9 IRON DEFICIENCY ANEMIA, UNSPECIFIED IRON DEFICIENCY ANEMIA TYPE: ICD-10-CM

## 2025-03-20 DIAGNOSIS — E78.2 MIXED HYPERLIPIDEMIA: ICD-10-CM

## 2025-03-20 DIAGNOSIS — E11.9 TYPE 2 DIABETES MELLITUS WITHOUT COMPLICATION, WITHOUT LONG-TERM CURRENT USE OF INSULIN: ICD-10-CM

## 2025-03-20 NOTE — PROGRESS NOTES
"Chief Complaint   Patient presents with    Hypertension     Routine follow up, lab follow up.    Follow-up with diabetes weight issues hearing loss, immobilization although she does get around well, here to follow-up with routine labs, no recent falls,    Objective   Vital Signs  Vitals:    03/20/25 1056   BP: 143/76   BP Location: Left arm   Patient Position: Sitting   Pulse: 74   Temp: 98.2 °F (36.8 °C)   Weight: 94.8 kg (209 lb)   Height: 152.4 cm (60\")      Body mass index is 40.82 kg/m².  Review of Systems   Constitutional: Negative.    HENT:  Positive for hearing loss.    Eyes: Negative.    Respiratory: Negative.     Cardiovascular: Negative.    Gastrointestinal: Negative.    Endocrine: Negative.    Genitourinary: Negative.    Musculoskeletal: Negative.    Allergic/Immunologic: Negative.    Neurological: Negative.    Hematological: Negative.    Psychiatric/Behavioral: Negative.        Physical Exam  Constitutional:       General: She is not in acute distress.     Appearance: Normal appearance. She is obese.   HENT:      Head: Normocephalic.      Mouth/Throat:      Mouth: Mucous membranes are moist.   Eyes:      Conjunctiva/sclera: Conjunctivae normal.      Pupils: Pupils are equal, round, and reactive to light.   Cardiovascular:      Rate and Rhythm: Normal rate and regular rhythm.      Pulses: Normal pulses.      Heart sounds: Normal heart sounds.   Pulmonary:      Effort: Pulmonary effort is normal.      Breath sounds: Normal breath sounds.   Abdominal:      General: Bowel sounds are normal.      Palpations: Abdomen is soft.   Musculoskeletal:         General: No swelling. Normal range of motion.      Cervical back: Neck supple.      Right lower leg: Edema present.      Left lower leg: Edema present.   Skin:     General: Skin is warm and dry.      Coloration: Skin is not jaundiced.   Neurological:      General: No focal deficit present.      Mental Status: She is alert and oriented to person, place, and " time. Mental status is at baseline.   Psychiatric:         Mood and Affect: Mood normal.         Behavior: Behavior normal.         Thought Content: Thought content normal.         Judgment: Judgment normal.        Result Review :   Lab Results   Component Value Date    PROBNP 282.0 09/09/2024    PROBNP 394.7 10/21/2023    PROBNP 77.4 10/26/2021    BNP 52 04/16/2019     CMP          9/9/2024    09:14 3/19/2025    09:02   CMP   Glucose 138  161    BUN 33  31    Creatinine 1.15  1.26    EGFR 46.5  41.4    Sodium 139  139    Potassium 5.2  5.2    Chloride 103  102    Calcium 10.1  10.7    Total Protein 6.9  7.3    Albumin 4.1  3.9    Globulin 2.8  3.4    Total Bilirubin 0.3  0.5    Alkaline Phosphatase 69  64    AST (SGOT) 12  12    ALT (SGPT) 11  11    Albumin/Globulin Ratio 1.5  1.1    BUN/Creatinine Ratio 28.7  24.6    Anion Gap 10.0  9.1      CBC w/diff          9/9/2024    09:14 3/19/2025    09:02   CBC w/Diff   WBC 10.48  10.06    RBC 3.92  3.99    Hemoglobin 11.5  11.6    Hematocrit 36.2  37.3    MCV 92.3  93.5    MCH 29.3  29.1    MCHC 31.8  31.1    RDW 14.4  13.5    Platelets 248  265    Neutrophil Rel % 77.3  77.2    Immature Granulocyte Rel % 0.4  0.4    Lymphocyte Rel % 12.9  12.2    Monocyte Rel % 6.9  7.8    Eosinophil Rel % 2.2  2.2    Basophil Rel % 0.3  0.2       Lipid Panel          9/9/2024    09:14   Lipid Panel   Total Cholesterol 124    Triglycerides 103    HDL Cholesterol 50    VLDL Cholesterol 19    LDL Cholesterol  55    LDL/HDL Ratio 1.07       Lab Results   Component Value Date    TSH 1.320 09/09/2024    TSH 2.320 08/31/2023    TSH 2.590 11/16/2020      Lab Results   Component Value Date    FREET4 1.32 09/09/2024    FREET4 1.33 08/31/2023    FREET4 1.2 08/15/2019      A1C Last 3 Results          9/9/2024    09:14 3/19/2025    09:02   HGBA1C Last 3 Results   Hemoglobin A1C 6.60  6.50                        Visit Diagnoses:    ICD-10-CM ICD-9-CM   1. Essential hypertension  I10 401.9   2. Type  2 diabetes mellitus without complication, without long-term current use of insulin  E11.9 250.00   3. Urge incontinence of urine  N39.41 788.31   4. Nonrheumatic mitral valve regurgitation  I34.0 424.0   5. Atherosclerosis of native coronary artery of native heart without angina pectoris  I25.10 414.01   6. Mixed hyperlipidemia  E78.2 272.2   7. Gastro-esophageal reflux disease without esophagitis  K21.9 530.81   8. Anxiety  F41.9 300.00   9. Iron deficiency anemia, unspecified iron deficiency anemia type  D50.9 280.9   10. Stage 3a chronic kidney disease  N18.31 585.3       Assessment and Plan   Diagnoses and all orders for this visit:    1. Essential hypertension (Primary)  -     Comprehensive Metabolic Panel; Future  -     CBC & Differential; Future  -     Hemoglobin A1c; Future  -     Lipid Panel; Future    2. Type 2 diabetes mellitus without complication, without long-term current use of insulin  -     Microalbumin / Creatinine Urine Ratio - Urine, Clean Catch  -     Comprehensive Metabolic Panel; Future  -     CBC & Differential; Future  -     Hemoglobin A1c; Future  -     Lipid Panel; Future    3. Urge incontinence of urine  -     Comprehensive Metabolic Panel; Future  -     CBC & Differential; Future  -     Hemoglobin A1c; Future  -     Lipid Panel; Future    4. Nonrheumatic mitral valve regurgitation  -     Comprehensive Metabolic Panel; Future  -     CBC & Differential; Future  -     Hemoglobin A1c; Future  -     Lipid Panel; Future    5. Atherosclerosis of native coronary artery of native heart without angina pectoris  -     Comprehensive Metabolic Panel; Future  -     CBC & Differential; Future  -     Hemoglobin A1c; Future  -     Lipid Panel; Future    6. Mixed hyperlipidemia  -     Comprehensive Metabolic Panel; Future  -     CBC & Differential; Future  -     Hemoglobin A1c; Future  -     Lipid Panel; Future    7. Gastro-esophageal reflux disease without esophagitis  -     Comprehensive Metabolic  Panel; Future  -     CBC & Differential; Future  -     Hemoglobin A1c; Future  -     Lipid Panel; Future    8. Anxiety  -     Comprehensive Metabolic Panel; Future  -     CBC & Differential; Future  -     Hemoglobin A1c; Future  -     Lipid Panel; Future    9. Iron deficiency anemia, unspecified iron deficiency anemia type  -     Comprehensive Metabolic Panel; Future  -     CBC & Differential; Future  -     Hemoglobin A1c; Future  -     Lipid Panel; Future    10. Stage 3a chronic kidney disease  -     Comprehensive Metabolic Panel; Future  -     CBC & Differential; Future  -     Hemoglobin A1c; Future  -     Lipid Panel; Future        Medicare annual wellness questionnaire exam completed September 12, 2024     lower extremity edema ---continue Lasix 20 mg daily, as needed,    Hearing loss,  hearing specialist == September 2024     Asthma and prior exac. - SEEING DR BANKS --- continues CARLY CHIU, NOV 2019, ---PULM NOD. ON CT APRIL 2019, REPEATED CT ABD/PELVIS AND CHEST ---JUNE 2019, ---------     Getting home oxygen wearing it at night as of March 2024     ALLERGRIES , CONT ZYRTEC AND SINGULAIR    Heart murmur, will follow clinically previous echo showed diastolic dysfunction moderate mitral valve regurgitation mild aortic valve regurgitation normal ejection fraction, mild LVH     HTN -- cont NORVASC 5 mg daily , Coreg 18.375 twice a day, losartan 50 bid,     IRON DEF ANEMIA, continues OTC iron supplements and vitamin C---- does not want to do colonoscopy endoscopy at this time, hemoglobin stable improved 11.5 September 9, 2024, 11.6 March 19, 2025     DM , continue AMARYL 1 mg qam , Januvia 50 mg daily, metformin 850 mg twice a day,----hemoglobin A1c 6.5 March 2025===, urine microalbumin normal September 9, 2024     OBESITY, continue mobilization portion control, discussed September 2024     DEPRESSION-   continue Paxil 40 mg daily     ELEVATED CHOL-continues PRAVASTATIN 80 MG QHS      CKD stage IIIa,       NEUROPATHY, RX OPTIONS DISCUSSED --B VITAMINS REC      EYE CHECKED --DOES JUAN LUIS, JAN 2023     DOES JUAN LUIS MAMMO IN [place] --OCT 2017, HAD DIAGN. MAMMO AND DID BX , BENIGN--LMAMMO NOV 2019, [place] ,--OK TO STOP MAY 2020,                 Follow Up   Return in about 6 months (around 9/20/2025).  Patient was given instructions and counseling regarding her condition or for health maintenance advice. Please see specific information pulled into the AVS if appropriate.

## 2025-03-31 RX ORDER — EZETIMIBE 10 MG/1
10 TABLET ORAL NIGHTLY
Qty: 90 TABLET | Refills: 3 | Status: SHIPPED | OUTPATIENT
Start: 2025-03-31

## 2025-04-07 RX ORDER — PANTOPRAZOLE SODIUM 40 MG/1
40 TABLET, DELAYED RELEASE ORAL DAILY
Qty: 90 TABLET | Refills: 3 | Status: SHIPPED | OUTPATIENT
Start: 2025-04-07

## 2025-04-24 RX ORDER — CARVEDILOL 12.5 MG/1
18.75 TABLET ORAL EVERY 12 HOURS SCHEDULED
Qty: 270 TABLET | Refills: 1 | Status: SHIPPED | OUTPATIENT
Start: 2025-04-24

## 2025-06-23 RX ORDER — AMLODIPINE BESYLATE 5 MG/1
5 TABLET ORAL DAILY
Qty: 90 TABLET | Refills: 3 | Status: SHIPPED | OUTPATIENT
Start: 2025-06-23

## 2025-06-30 RX ORDER — MONTELUKAST SODIUM 10 MG/1
10 TABLET ORAL EVERY MORNING
Qty: 90 TABLET | Refills: 1 | Status: SHIPPED | OUTPATIENT
Start: 2025-06-30

## 2025-07-23 DIAGNOSIS — E78.00 PURE HYPERCHOLESTEROLEMIA: Chronic | ICD-10-CM

## 2025-07-23 RX ORDER — PRAVASTATIN SODIUM 80 MG/1
80 TABLET ORAL DAILY
Qty: 90 TABLET | Refills: 3 | Status: SHIPPED | OUTPATIENT
Start: 2025-07-23

## 2025-08-07 ENCOUNTER — OFFICE VISIT (OUTPATIENT)
Dept: CARDIOLOGY | Facility: CLINIC | Age: 89
End: 2025-08-07
Payer: MEDICARE

## 2025-08-07 VITALS
WEIGHT: 208.8 LBS | BODY MASS INDEX: 40.99 KG/M2 | DIASTOLIC BLOOD PRESSURE: 64 MMHG | HEIGHT: 60 IN | SYSTOLIC BLOOD PRESSURE: 152 MMHG | HEART RATE: 62 BPM

## 2025-08-07 DIAGNOSIS — I10 ESSENTIAL HYPERTENSION: Primary | Chronic | ICD-10-CM

## 2025-08-07 DIAGNOSIS — I25.10 CORONARY ARTERY DISEASE INVOLVING NATIVE CORONARY ARTERY OF NATIVE HEART WITHOUT ANGINA PECTORIS: ICD-10-CM

## 2025-08-07 DIAGNOSIS — E78.2 MIXED HYPERLIPIDEMIA: ICD-10-CM

## 2025-08-07 PROCEDURE — 99214 OFFICE O/P EST MOD 30 MIN: CPT | Performed by: INTERNAL MEDICINE

## 2025-08-07 RX ORDER — NITROGLYCERIN 400 UG/1
1 SPRAY ORAL
Qty: 1 EACH | Refills: 12 | Status: SHIPPED | OUTPATIENT
Start: 2025-08-07

## 2025-08-08 ENCOUNTER — TELEPHONE (OUTPATIENT)
Dept: CARDIOLOGY | Facility: CLINIC | Age: 89
End: 2025-08-08
Payer: MEDICARE

## 2025-08-11 ENCOUNTER — TELEPHONE (OUTPATIENT)
Dept: CARDIOLOGY | Facility: CLINIC | Age: 89
End: 2025-08-11
Payer: MEDICARE

## 2025-08-22 DIAGNOSIS — J45.40 MODERATE PERSISTENT ASTHMA WITHOUT COMPLICATION: Chronic | ICD-10-CM

## 2025-08-22 DIAGNOSIS — J42 CHRONIC BRONCHITIS, UNSPECIFIED CHRONIC BRONCHITIS TYPE: Chronic | ICD-10-CM

## 2025-08-22 RX ORDER — FLUTICASONE FUROATE AND VILANTEROL TRIFENATATE 200; 25 UG/1; UG/1
1 POWDER RESPIRATORY (INHALATION) DAILY
Qty: 60 EACH | Refills: 11 | Status: SHIPPED | OUTPATIENT
Start: 2025-08-22

## 2025-08-27 ENCOUNTER — OFFICE VISIT (OUTPATIENT)
Dept: PULMONOLOGY | Facility: CLINIC | Age: 89
End: 2025-08-27
Payer: MEDICARE

## 2025-08-27 VITALS
HEIGHT: 60 IN | WEIGHT: 208 LBS | BODY MASS INDEX: 40.84 KG/M2 | RESPIRATION RATE: 16 BRPM | OXYGEN SATURATION: 90 % | HEART RATE: 62 BPM | DIASTOLIC BLOOD PRESSURE: 75 MMHG | SYSTOLIC BLOOD PRESSURE: 170 MMHG | TEMPERATURE: 97.6 F

## 2025-08-27 DIAGNOSIS — R06.09 DYSPNEA ON EXERTION: ICD-10-CM

## 2025-08-27 DIAGNOSIS — J42 CHRONIC BRONCHITIS, UNSPECIFIED CHRONIC BRONCHITIS TYPE: Chronic | ICD-10-CM

## 2025-08-27 DIAGNOSIS — J30.2 SEASONAL ALLERGIES: ICD-10-CM

## 2025-08-27 DIAGNOSIS — J96.11 CHRONIC RESPIRATORY FAILURE WITH HYPOXIA: Chronic | ICD-10-CM

## 2025-08-27 DIAGNOSIS — J45.40 MODERATE PERSISTENT ASTHMA WITHOUT COMPLICATION: Primary | Chronic | ICD-10-CM

## 2025-08-27 RX ORDER — ALBUTEROL SULFATE 90 UG/1
2 INHALANT RESPIRATORY (INHALATION) EVERY 4 HOURS PRN
Qty: 18 G | Refills: 2 | Status: SHIPPED | OUTPATIENT
Start: 2025-08-27